# Patient Record
Sex: FEMALE | Race: BLACK OR AFRICAN AMERICAN | URBAN - METROPOLITAN AREA
[De-identification: names, ages, dates, MRNs, and addresses within clinical notes are randomized per-mention and may not be internally consistent; named-entity substitution may affect disease eponyms.]

---

## 2019-01-16 ENCOUNTER — EMERGENCY (EMERGENCY)
Facility: HOSPITAL | Age: 64
LOS: 0 days | Discharge: HOME | End: 2019-01-16
Attending: EMERGENCY MEDICINE | Admitting: EMERGENCY MEDICINE

## 2019-01-16 VITALS
SYSTOLIC BLOOD PRESSURE: 217 MMHG | DIASTOLIC BLOOD PRESSURE: 106 MMHG | RESPIRATION RATE: 18 BRPM | TEMPERATURE: 98 F | HEART RATE: 69 BPM | OXYGEN SATURATION: 98 %

## 2019-01-16 VITALS
OXYGEN SATURATION: 100 % | TEMPERATURE: 97 F | RESPIRATION RATE: 18 BRPM | SYSTOLIC BLOOD PRESSURE: 148 MMHG | DIASTOLIC BLOOD PRESSURE: 80 MMHG | HEART RATE: 63 BPM

## 2019-01-16 DIAGNOSIS — Z79.899 OTHER LONG TERM (CURRENT) DRUG THERAPY: ICD-10-CM

## 2019-01-16 DIAGNOSIS — I10 ESSENTIAL (PRIMARY) HYPERTENSION: ICD-10-CM

## 2019-01-16 DIAGNOSIS — E11.65 TYPE 2 DIABETES MELLITUS WITH HYPERGLYCEMIA: ICD-10-CM

## 2019-01-16 DIAGNOSIS — Z79.84 LONG TERM (CURRENT) USE OF ORAL HYPOGLYCEMIC DRUGS: ICD-10-CM

## 2019-01-16 LAB
ALBUMIN SERPL ELPH-MCNC: 4.7 G/DL — SIGNIFICANT CHANGE UP (ref 3.5–5.2)
ALP SERPL-CCNC: 93 U/L — SIGNIFICANT CHANGE UP (ref 30–115)
ALT FLD-CCNC: 13 U/L — SIGNIFICANT CHANGE UP (ref 0–41)
ANION GAP SERPL CALC-SCNC: 17 MMOL/L — HIGH (ref 7–14)
APPEARANCE UR: CLEAR — SIGNIFICANT CHANGE UP
AST SERPL-CCNC: 17 U/L — SIGNIFICANT CHANGE UP (ref 0–41)
BACTERIA # UR AUTO: ABNORMAL /HPF
BASE EXCESS BLDV CALC-SCNC: 5 MMOL/L — HIGH (ref -2–2)
BASOPHILS # BLD AUTO: 0.04 K/UL — SIGNIFICANT CHANGE UP (ref 0–0.2)
BASOPHILS NFR BLD AUTO: 0.7 % — SIGNIFICANT CHANGE UP (ref 0–1)
BILIRUB SERPL-MCNC: <0.2 MG/DL — SIGNIFICANT CHANGE UP (ref 0.2–1.2)
BILIRUB UR-MCNC: NEGATIVE — SIGNIFICANT CHANGE UP
BUN SERPL-MCNC: 10 MG/DL — SIGNIFICANT CHANGE UP (ref 10–20)
CA-I SERPL-SCNC: 1.17 MMOL/L — SIGNIFICANT CHANGE UP (ref 1.12–1.3)
CALCIUM SERPL-MCNC: 9.6 MG/DL — SIGNIFICANT CHANGE UP (ref 8.5–10.1)
CHLORIDE SERPL-SCNC: 91 MMOL/L — LOW (ref 98–110)
CO2 SERPL-SCNC: 28 MMOL/L — SIGNIFICANT CHANGE UP (ref 17–32)
COLOR SPEC: YELLOW — SIGNIFICANT CHANGE UP
CREAT SERPL-MCNC: 1.1 MG/DL — SIGNIFICANT CHANGE UP (ref 0.7–1.5)
DIFF PNL FLD: NEGATIVE — SIGNIFICANT CHANGE UP
EOSINOPHIL # BLD AUTO: 0.06 K/UL — SIGNIFICANT CHANGE UP (ref 0–0.7)
EOSINOPHIL NFR BLD AUTO: 1 % — SIGNIFICANT CHANGE UP (ref 0–8)
EPI CELLS # UR: ABNORMAL /HPF
GAS PNL BLDV: 138 MMOL/L — SIGNIFICANT CHANGE UP (ref 136–145)
GAS PNL BLDV: SIGNIFICANT CHANGE UP
GLUCOSE SERPL-MCNC: 272 MG/DL — HIGH (ref 70–99)
GLUCOSE UR QL: 500 MG/DL
HCO3 BLDV-SCNC: 32 MMOL/L — HIGH (ref 22–29)
HCT VFR BLD CALC: 41.3 % — SIGNIFICANT CHANGE UP (ref 37–47)
HCT VFR BLDA CALC: 42.1 % — SIGNIFICANT CHANGE UP (ref 34–44)
HGB BLD CALC-MCNC: 13.7 G/DL — LOW (ref 14–18)
HGB BLD-MCNC: 13.4 G/DL — SIGNIFICANT CHANGE UP (ref 12–16)
IMM GRANULOCYTES NFR BLD AUTO: 0.3 % — SIGNIFICANT CHANGE UP (ref 0.1–0.3)
KETONES UR-MCNC: NEGATIVE — SIGNIFICANT CHANGE UP
LACTATE BLDV-MCNC: 2.7 MMOL/L — HIGH (ref 0.5–1.6)
LEUKOCYTE ESTERASE UR-ACNC: NEGATIVE — SIGNIFICANT CHANGE UP
LYMPHOCYTES # BLD AUTO: 2.22 K/UL — SIGNIFICANT CHANGE UP (ref 1.2–3.4)
LYMPHOCYTES # BLD AUTO: 36.8 % — SIGNIFICANT CHANGE UP (ref 20.5–51.1)
MCHC RBC-ENTMCNC: 26.5 PG — LOW (ref 27–31)
MCHC RBC-ENTMCNC: 32.4 G/DL — SIGNIFICANT CHANGE UP (ref 32–37)
MCV RBC AUTO: 81.8 FL — SIGNIFICANT CHANGE UP (ref 81–99)
MONOCYTES # BLD AUTO: 0.44 K/UL — SIGNIFICANT CHANGE UP (ref 0.1–0.6)
MONOCYTES NFR BLD AUTO: 7.3 % — SIGNIFICANT CHANGE UP (ref 1.7–9.3)
NEUTROPHILS # BLD AUTO: 3.25 K/UL — SIGNIFICANT CHANGE UP (ref 1.4–6.5)
NEUTROPHILS NFR BLD AUTO: 53.9 % — SIGNIFICANT CHANGE UP (ref 42.2–75.2)
NITRITE UR-MCNC: NEGATIVE — SIGNIFICANT CHANGE UP
NRBC # BLD: 0 /100 WBCS — SIGNIFICANT CHANGE UP (ref 0–0)
PCO2 BLDV: 54 MMHG — HIGH (ref 41–51)
PH BLDV: 7.38 — SIGNIFICANT CHANGE UP (ref 7.26–7.43)
PH UR: 7 — SIGNIFICANT CHANGE UP (ref 5–8)
PLATELET # BLD AUTO: 171 K/UL — SIGNIFICANT CHANGE UP (ref 130–400)
PO2 BLDV: 22 MMHG — SIGNIFICANT CHANGE UP (ref 20–40)
POTASSIUM BLDV-SCNC: 3.4 MMOL/L — SIGNIFICANT CHANGE UP (ref 3.3–5.6)
POTASSIUM SERPL-MCNC: 3.8 MMOL/L — SIGNIFICANT CHANGE UP (ref 3.5–5)
POTASSIUM SERPL-SCNC: 3.8 MMOL/L — SIGNIFICANT CHANGE UP (ref 3.5–5)
PROT SERPL-MCNC: 8.2 G/DL — HIGH (ref 6–8)
PROT UR-MCNC: 30 MG/DL
RBC # BLD: 5.05 M/UL — SIGNIFICANT CHANGE UP (ref 4.2–5.4)
RBC # FLD: 13.6 % — SIGNIFICANT CHANGE UP (ref 11.5–14.5)
SAO2 % BLDV: 36 % — SIGNIFICANT CHANGE UP
SODIUM SERPL-SCNC: 136 MMOL/L — SIGNIFICANT CHANGE UP (ref 135–146)
SP GR SPEC: 1.01 — SIGNIFICANT CHANGE UP (ref 1.01–1.03)
TROPONIN T SERPL-MCNC: <0.01 NG/ML — SIGNIFICANT CHANGE UP
UROBILINOGEN FLD QL: 1 MG/DL (ref 0.2–0.2)
WBC # BLD: 6.03 K/UL — SIGNIFICANT CHANGE UP (ref 4.8–10.8)
WBC # FLD AUTO: 6.03 K/UL — SIGNIFICANT CHANGE UP (ref 4.8–10.8)
WBC UR QL: SIGNIFICANT CHANGE UP /HPF

## 2019-01-16 RX ORDER — AMLODIPINE BESYLATE 2.5 MG/1
1 TABLET ORAL
Qty: 14 | Refills: 0
Start: 2019-01-16 | End: 2019-01-29

## 2019-01-16 RX ORDER — SODIUM CHLORIDE 9 MG/ML
1000 INJECTION INTRAMUSCULAR; INTRAVENOUS; SUBCUTANEOUS ONCE
Qty: 0 | Refills: 0 | Status: COMPLETED | OUTPATIENT
Start: 2019-01-16 | End: 2019-01-16

## 2019-01-16 RX ADMIN — SODIUM CHLORIDE 2400 MILLILITER(S): 9 INJECTION INTRAMUSCULAR; INTRAVENOUS; SUBCUTANEOUS at 17:45

## 2019-01-16 NOTE — ED PROVIDER NOTE - NS ED ROS FT
Review of Systems    Constitutional: (-) fever  Eyes/ENT: (-) blurry vision, (-) epistaxis  Cardiovascular: (-) chest pain, (-) syncope  Respiratory: (-) cough, (-) shortness of breath  Gastrointestinal: (-) vomiting, (-) diarrhea  Musculoskeletal: (-) neck pain, (-) back pain, (-) joint pain  Integumentary: (-) rash, (-) edema  Neurological: (-) headache, (-) altered mental status + mild global weakness   Psychiatric: (-) hallucinations  Allergic/Immunologic: (-) pruritus  All other systems are negative except as mentioned above

## 2019-01-16 NOTE — ED PROVIDER NOTE - CARE PROVIDER_API CALL
Lester Walters), Internal Medicine  [address] [place] [address]  Phone: [phone]  Fax: [phone]

## 2019-01-16 NOTE — ED PROVIDER NOTE - PHYSICAL EXAMINATION
CON: ao x 3, HENMT: clear oropharynx,  neck supple,  CV: rrr, equal pulses b/l, RESP: cta b/l, GI:  soft, nontender, no rebound, no guarding, SKIN: no rash, MSK: no deformities, NEURO: no gross motor or sensory deficit normal gait, normal finger to nose, motor 5/5 X4, Psychiatric: appropriate mood, appropriate affect

## 2019-01-16 NOTE — ED ADULT TRIAGE NOTE - CHIEF COMPLAINT QUOTE
high blood pressure and high blood sugar , hx of HTN and DM sent by PMD high blood pressure and high blood sugar , hx of HTN and DM sent by PMD  in triage

## 2019-01-16 NOTE — ED PROVIDER NOTE - NSFOLLOWUPINSTRUCTIONS_ED_ALL_ED_FT
f/u on tuesday next week    Hypertension    Hypertension, commonly called high blood pressure, is when the force of blood pumping through your arteries is too strong. Hypertension forces your heart to work harder to pump blood. Your arteries may become narrow or stiff. Having untreated or uncontrolled hypertension for a long period of time can cause heart attack, stroke, kidney disease, and other problems. If started on a medication, take exactly as prescribed by your health care professional. Maintain a healthy lifestyle and follow up with your primary care physician.    SEEK IMMEDIATE MEDICAL CARE IF YOU HAVE ANY OF THE FOLLOWING SYMPTOMS: severe headache, confusion, chest pain, abdominal pain, vomiting, or shortness of breath.

## 2019-01-16 NOTE — ED PROVIDER NOTE - OBJECTIVE STATEMENT
63yr old female hx of dm on metformin  850mg, htn on losartan/hctz 100-25 here for eval of elevated bp and elevated blood sugars. pt reports she went to Dr. Walters today for regular cheeckup. found to have elevated bp and blood sugar so sent here. pt reports mild feel of global weakness, no other complaints. pt taking meds as prescribed. pt was sent in by Dr. Walters. pt denies chest pain, sob, polyuria, polydipsia, fever, chills.

## 2021-05-24 ENCOUNTER — INPATIENT (INPATIENT)
Facility: HOSPITAL | Age: 66
LOS: 7 days | Discharge: HOME HEALTH | End: 2021-06-01
Attending: INTERNAL MEDICINE | Admitting: INTERNAL MEDICINE
Payer: MEDICARE

## 2021-05-24 VITALS
DIASTOLIC BLOOD PRESSURE: 78 MMHG | HEART RATE: 78 BPM | TEMPERATURE: 98 F | RESPIRATION RATE: 18 BRPM | OXYGEN SATURATION: 98 % | SYSTOLIC BLOOD PRESSURE: 215 MMHG

## 2021-05-24 LAB
ALBUMIN SERPL ELPH-MCNC: 4.8 G/DL — SIGNIFICANT CHANGE UP (ref 3.5–5.2)
ALP SERPL-CCNC: 69 U/L — SIGNIFICANT CHANGE UP (ref 30–115)
ALT FLD-CCNC: 9 U/L — SIGNIFICANT CHANGE UP (ref 0–41)
ANION GAP SERPL CALC-SCNC: 13 MMOL/L — SIGNIFICANT CHANGE UP (ref 7–14)
AST SERPL-CCNC: 18 U/L — SIGNIFICANT CHANGE UP (ref 0–41)
BASOPHILS # BLD AUTO: 0.04 K/UL — SIGNIFICANT CHANGE UP (ref 0–0.2)
BASOPHILS NFR BLD AUTO: 0.7 % — SIGNIFICANT CHANGE UP (ref 0–1)
BILIRUB SERPL-MCNC: 0.2 MG/DL — SIGNIFICANT CHANGE UP (ref 0.2–1.2)
BUN SERPL-MCNC: 17 MG/DL — SIGNIFICANT CHANGE UP (ref 10–20)
CALCIUM SERPL-MCNC: 9.5 MG/DL — SIGNIFICANT CHANGE UP (ref 8.5–10.1)
CHLORIDE SERPL-SCNC: 98 MMOL/L — SIGNIFICANT CHANGE UP (ref 98–110)
CHOLEST SERPL-MCNC: 200 MG/DL — HIGH
CO2 SERPL-SCNC: 27 MMOL/L — SIGNIFICANT CHANGE UP (ref 17–32)
CREAT SERPL-MCNC: 0.8 MG/DL — SIGNIFICANT CHANGE UP (ref 0.7–1.5)
EOSINOPHIL # BLD AUTO: 0.03 K/UL — SIGNIFICANT CHANGE UP (ref 0–0.7)
EOSINOPHIL NFR BLD AUTO: 0.5 % — SIGNIFICANT CHANGE UP (ref 0–8)
GLUCOSE SERPL-MCNC: 136 MG/DL — HIGH (ref 70–99)
HCT VFR BLD CALC: 38.6 % — SIGNIFICANT CHANGE UP (ref 37–47)
HDLC SERPL-MCNC: 66 MG/DL — SIGNIFICANT CHANGE UP
HGB BLD-MCNC: 12.4 G/DL — SIGNIFICANT CHANGE UP (ref 12–16)
IMM GRANULOCYTES NFR BLD AUTO: 0.3 % — SIGNIFICANT CHANGE UP (ref 0.1–0.3)
LIPID PNL WITH DIRECT LDL SERPL: 111 MG/DL — HIGH
LYMPHOCYTES # BLD AUTO: 2.46 K/UL — SIGNIFICANT CHANGE UP (ref 1.2–3.4)
LYMPHOCYTES # BLD AUTO: 42.1 % — SIGNIFICANT CHANGE UP (ref 20.5–51.1)
MCHC RBC-ENTMCNC: 25.9 PG — LOW (ref 27–31)
MCHC RBC-ENTMCNC: 32.1 G/DL — SIGNIFICANT CHANGE UP (ref 32–37)
MCV RBC AUTO: 80.6 FL — LOW (ref 81–99)
MONOCYTES # BLD AUTO: 0.46 K/UL — SIGNIFICANT CHANGE UP (ref 0.1–0.6)
MONOCYTES NFR BLD AUTO: 7.9 % — SIGNIFICANT CHANGE UP (ref 1.7–9.3)
NEUTROPHILS # BLD AUTO: 2.84 K/UL — SIGNIFICANT CHANGE UP (ref 1.4–6.5)
NEUTROPHILS NFR BLD AUTO: 48.5 % — SIGNIFICANT CHANGE UP (ref 42.2–75.2)
NON HDL CHOLESTEROL: 134 MG/DL — HIGH
NRBC # BLD: 0 /100 WBCS — SIGNIFICANT CHANGE UP (ref 0–0)
PLATELET # BLD AUTO: 158 K/UL — SIGNIFICANT CHANGE UP (ref 130–400)
POTASSIUM SERPL-MCNC: 3.4 MMOL/L — LOW (ref 3.5–5)
POTASSIUM SERPL-SCNC: 3.4 MMOL/L — LOW (ref 3.5–5)
PROT SERPL-MCNC: 7.7 G/DL — SIGNIFICANT CHANGE UP (ref 6–8)
RBC # BLD: 4.79 M/UL — SIGNIFICANT CHANGE UP (ref 4.2–5.4)
RBC # FLD: 14.5 % — SIGNIFICANT CHANGE UP (ref 11.5–14.5)
SARS-COV-2 RNA SPEC QL NAA+PROBE: SIGNIFICANT CHANGE UP
SODIUM SERPL-SCNC: 138 MMOL/L — SIGNIFICANT CHANGE UP (ref 135–146)
TRIGL SERPL-MCNC: 154 MG/DL — HIGH
TROPONIN T SERPL-MCNC: <0.01 NG/ML — SIGNIFICANT CHANGE UP
WBC # BLD: 5.85 K/UL — SIGNIFICANT CHANGE UP (ref 4.8–10.8)
WBC # FLD AUTO: 5.85 K/UL — SIGNIFICANT CHANGE UP (ref 4.8–10.8)

## 2021-05-24 PROCEDURE — 99291 CRITICAL CARE FIRST HOUR: CPT | Mod: CS

## 2021-05-24 PROCEDURE — 93010 ELECTROCARDIOGRAM REPORT: CPT

## 2021-05-24 PROCEDURE — 70498 CT ANGIOGRAPHY NECK: CPT | Mod: 26

## 2021-05-24 PROCEDURE — 70496 CT ANGIOGRAPHY HEAD: CPT | Mod: 26

## 2021-05-24 PROCEDURE — 99223 1ST HOSP IP/OBS HIGH 75: CPT

## 2021-05-24 PROCEDURE — 70450 CT HEAD/BRAIN W/O DYE: CPT | Mod: 26,59,MA

## 2021-05-24 RX ORDER — POTASSIUM CHLORIDE 20 MEQ
40 PACKET (EA) ORAL ONCE
Refills: 0 | Status: COMPLETED | OUTPATIENT
Start: 2021-05-24 | End: 2021-05-24

## 2021-05-24 RX ORDER — ASPIRIN/CALCIUM CARB/MAGNESIUM 324 MG
243 TABLET ORAL ONCE
Refills: 0 | Status: COMPLETED | OUTPATIENT
Start: 2021-05-24 | End: 2021-05-24

## 2021-05-24 RX ORDER — CLOPIDOGREL BISULFATE 75 MG/1
300 TABLET, FILM COATED ORAL ONCE
Refills: 0 | Status: COMPLETED | OUTPATIENT
Start: 2021-05-24 | End: 2021-05-24

## 2021-05-24 RX ADMIN — CLOPIDOGREL BISULFATE 300 MILLIGRAM(S): 75 TABLET, FILM COATED ORAL at 21:39

## 2021-05-24 RX ADMIN — Medication 40 MILLIEQUIVALENT(S): at 21:44

## 2021-05-24 RX ADMIN — Medication 243 MILLIGRAM(S): at 21:40

## 2021-05-24 NOTE — H&P ADULT - ATTENDING COMMENTS
pt seen and examined.   pt still has slurred speech and facial droop ( left)   no weakness on ext  no dysphagia  mental status wnl    T(F): 98.4 (05-25-21 @ 07:38), Max: 99.1 (05-25-21 @ 03:46)  HR: 86 (05-25-21 @ 07:38) (78 - 87)  BP: 139/97 (05-25-21 @ 07:38) (139/97 - 215/78)  RR: 18 (05-25-21 @ 07:38) (18 - 18)  SpO2: 97% (05-25-21 @ 07:38) (95% - 98%)    < from: CT Angio Neck w/ IV Cont (05.24.21 @ 22:09) >    -Focal severe stenosis of the proximal left intracranial vertebral artery.  -Moderate to severe stenosis of the mid basilar artery.  -No CTA evidence of aneurysm or dissection.  -Hyperenhancing soft tissue structures visualized in the anterior soft tissues of the neck and base of tongue. Findings may reflect enhancing glandular tissue, ectopic thyroid or mass; this may be confirmed with nuclear medicine thyroid scan.    < end of copied text >    a/p  # acute cva ( clinical )   # vertebral art stenosis   # basilar art stenosis   # thryoid and base of tongue mass, check Thyroid function test. needs nuclear medicine thyroid scan, but it cannot be done for 4 more weeks bc pt just received contrast ( containing iodine)     #Progress Note Handoff  Pending (specify):  Consult: neuro, tests: MRI   Family discussion: dw pt and her daughter at the bedside. they understand plan and agree. pt is full code  Disposition: Home_

## 2021-05-24 NOTE — ED PROVIDER NOTE - CRITICAL CARE ATTENDING CONTRIBUTION TO CARE
66F pmh dm, htn, hl, hypothyroidism on baby asa p/w cva-sx x 3d. Woke up Sat am / 3d prior with R facial droop, R hand numbness & R foot weakness, described as having to "move her leg more" to walk. Saw PCP Dr. Daniel today, referred to ED for stroke work-up, requested call back and admission to hospitalist. Pt denies any falls or head trauma. No ha, vision changes, neck pain, cp/sob, nv, abd pain. Hypertensive in ED to 220s/120s, states she takes losartan, does not recall any other BP meds.    nad  skin warm, dry  ncat  neck supple  rrr nl s1s2 no mrg  ctab no wrr  abd soft ntnd no palpable masses no rgr  back non-tender no cvat  ext no cce dpi  neuro aaox3, mild R facial droop & slurred speech, remaining CN intact bl, no nystagmus, mild decr R hand  strength, no drift of UE/LE bl, mild decr R foot plantar flextion, gross/objective sensation intact, finger-nose nl, gait wnl

## 2021-05-24 NOTE — ED PROVIDER NOTE - CARE PLAN
Principal Discharge DX:	CVA (cerebral vascular accident)   Principal Discharge DX:	CVA (cerebral vascular accident)  Secondary Diagnosis:	Hypokalemia

## 2021-05-24 NOTE — CONSULT NOTE ADULT - SUBJECTIVE AND OBJECTIVE BOX
Neurology Consult    Patient is a 66y old  Female who presents with a chief complaint of     HPI: 66F on ASA at home for unclear reason (states her doctor told her to have it but doesnt know why), HTN, DM presents to the ED today for 2 days of R sided weakness, sent in by her PCP for concern for an acute stroke. Patient hypertensive to the 200s and NIH stroke scale 1 for RUE weakness. CTH pending     PAST MEDICAL & SURGICAL HISTORY:  Diabetes  Hypertension    Social History: (-) x 3  Allergies  No Known Allergies  Intolerances    MEDICATIONS  (STANDING):  ASA 81  Lipitor 80    Review of systems:    Constitutional: as per HPI  Neurological: As per HPI      Vital Signs Last 24 Hrs  T(C): 36.7 (24 May 2021 16:54), Max: 36.7 (24 May 2021 16:54)  T(F): 98 (24 May 2021 16:54), Max: 98 (24 May 2021 16:54)  HR: 78 (24 May 2021 16:54) (78 - 78)  BP: 215/78 (24 May 2021 16:54) (215/78 - 215/78)  BP(mean): --  RR: 18 (24 May 2021 16:54) (18 - 18)  SpO2: 98% (24 May 2021 16:54) (98% - 98%)    Examination:  AxOx3, able to follow simple and complex commands  PERRL, EMOI, Face symmetric, VFF  RUE 4+/5 otherwise 5/5 throughout  gait deferred     Labs: pending   Neuroimaging:  NCHCT:   pending

## 2021-05-24 NOTE — H&P ADULT - HISTORY OF PRESENT ILLNESS
66 F pmh DM, HTN, DLD, hypothyroidism on baby ASA p/w CVA-like symptoms x 3d. 3 days ago woke up w/ R facial droop, R hand numbness & R foot weakness, described as having to "move her leg more" to walk. Saw PCP Dr. Daniel prior to admission, referred to ED for stroke work-up. Pt denies any falls or head trauma. No ha, vision changes, neck pain, cp/sob, nv, abd pain. Hypertensive in ED to 220s/120s, states she takes losartan, does not recall any other BP meds.    CTH showing more prominent microvascular changes since prior exam (1/16/19) but no evidence of territorial infarct. CTA H/N showing focal severe stenosis of proximal L intracranial vertebral artery, moderate stenosis of mild basilar artery, no aneurysm/dissection.    66 F pmh DM, HTN, DLD, total thyroidectomy, hypothyroidism, unspecified heart murmur on baby ASA p/w CVA-like symptoms x 3d. 3 days ago patient states she had difficulty writing (R-handed) and her children told her her face "looked strange" and that she was speaking slowly. Additionally she was having R foot weakness described as having to "more her leg more". She made PCP appt for 5/24 and she was told to come to the ED. In the ED noted to have R hand numbness & R foot weakness. Pt denies any falls or head trauma. No ha, vision changes, neck pain, cp/sob, nv, abd pain. Hypertensive in ED to 220s/120s.   CTH showing more prominent microvascular changes since prior exam (1/16/19) but no evidence of territorial infarct. CTA H/N showing focal severe stenosis of proximal L intracranial vertebral artery, moderate stenosis of mild basilar artery, no aneurysm/dissection.

## 2021-05-24 NOTE — ED ADULT NURSE NOTE - CHPI ED NUR SYMPTOMS NEG
no blurred vision/no change in level of consciousness/no confusion/no dizziness/no fever/no nausea/no vomiting

## 2021-05-24 NOTE — ED PROVIDER NOTE - PHYSICAL EXAMINATION
PE:  nad  skin warm, dry  ncat  neck supple  rrr nl s1s2 no mrg  ctab no wrr  abd soft ntnd no palpable masses no rgr  back non-tender no cvat  ext no cce dpi  neuro aaox3, mild R facial droop & slurred speech, remaining CN intact bl, no nystagmus, mild decr R hand  strength, no drift of UE/LE bl, mild decr R foot plantar flextion, gross/objective sensation intact, finger-nose nl, gait wnl

## 2021-05-24 NOTE — ED PROVIDER NOTE - CLINICAL SUMMARY MEDICAL DECISION MAKING FREE TEXT BOX
cva sx x 3d - neuro consulted, outside window for tPA, nc cth neg, cta head/neck +severe L prox ICA stenosis, case discussed btw Neuro NP & Dr. Gillis who rec full asa, loading plavix 300, further recs/imaging as documented in consult note, and stroke unit admission cva sx x 3d - neuro consulted, outside window for tPA, nc cth neg, cta head/neck +severe L prox ICA stenosis, case discussed btw Neuro NP & Dr. Gillis who rec full asa, loading plavix 300, permissive htn x 24h <220/110, further recs/imaging as documented in consult note, and stroke unit admission

## 2021-05-24 NOTE — ED PROVIDER NOTE - OBJECTIVE STATEMENT
66F pmh dm, htn, hl, hypothyroidism on baby asa p/w cva-sx x 3d. Woke up Sat am / 3d prior with R facial droop, R hand numbness & R foot weakness, described as having to "move her leg more" to walk. Saw PCP Dr. Daniel today, referred to ED for stroke work-up, requested call back and admission to hospitalist. Pt denies any falls or head trauma. No ha, vision changes, neck pain, cp/sob, nv, abd pain. Hypertensive in ED to 220s/120s, states she takes losartan, does not recall any other BP meds.

## 2021-05-24 NOTE — ED PROVIDER NOTE - NS ED ROS FT
Constitutional: No fever, chills, unintended weight loss.  Eyes:  No visual changes, eye pain or discharge.  ENMT:  No hearing changes, pain, no sore throat or runny nose, no difficulty swallowing  Cardiac:  No chest pain, SOB or edema. No chest pain with exertion.  Respiratory:  No cough or respiratory distress. No hemoptysis. No history of asthma or RAD.  GI:  No nausea, vomiting, diarrhea or abdominal pain.  :  No dysuria, frequency or burning.  MS:  No myalgia, muscle weakness, joint pain or back pain.  Neuro:  see hpi  Skin:  No skin rash.   Endocrine: +hypothyroidism +diabetes.

## 2021-05-24 NOTE — H&P ADULT - NSHPPHYSICALEXAM_GEN_ALL_CORE
PHYSICAL EXAM:  GENERAL: NAD, lying in bed comfortably  HEAD:  Atraumatic, Normocephalic  EYES: EOMI, PERRLA, conjunctiva and sclera clear  ENT: Moist mucous membranes  NECK: Supple, No JVD  CHEST/LUNG: Clear to auscultation bilaterally; No rales, rhonchi, wheezing, or rubs. Unlabored respirations  HEART: Regular rate and rhythm; No murmurs, rubs, or gallops  ABDOMEN: Bowel sounds present; Soft, Nontender, Nondistended. No hepatomegally  EXTREMITIES:  2+ Peripheral Pulses, brisk capillary refill. No clubbing, cyanosis, or edema  NERVOUS SYSTEM:  Alert & Oriented X3, speech clear. R facial droop (mild) w/ some dysarthria; 4/5 R hand  strength, 4/5 R foot plantar flexion; No dysdiadochoknesia; no dysmetria;   MSK: FROM all 4 extremities, full and equal strength  SKIN: No rashes or lesions PHYSICAL EXAM:  GENERAL: NAD, lying in bed comfortably  HEAD:  Atraumatic, Normocephalic  EYES: EOMI, PERRLA, conjunctiva and sclera clear  ENT: Moist mucous membranes  NECK: Supple, No JVD  CHEST/LUNG: Clear to auscultation bilaterally; No rales, rhonchi, wheezing, or rubs. Unlabored respirations  HEART: Regular rate and rhythm; No murmurs, rubs, or gallops  ABDOMEN: Bowel sounds present; Soft, Nontender, Nondistended. No hepatomegally  EXTREMITIES:  2+ Peripheral Pulses, brisk capillary refill. No clubbing, cyanosis, or edema  NERVOUS SYSTEM:  Alert & Oriented X3, speech clear. L facial droop w/ some dysarthria; 4/5 R hand  strength, 4/5 R foot dorsi flexion; No dysdiadochokinesia; no dysmetria;   MSK: FROM all 4 extremities, full and equal strength  SKIN: No rashes or lesions

## 2021-05-24 NOTE — ED ADULT NURSE NOTE - OBJECTIVE STATEMENT
Patient presents to ED with complaints of slowed speech, right hand numbness, and right foot weakness since Saturday. Patient reports she woke up on Saturday and noticed her speech was more slowed than normal and she had difficulty writing with her right hand. Patient states she feels like she is dragging her right leg when walking.

## 2021-05-24 NOTE — H&P ADULT - NSHPREVIEWOFSYSTEMS_GEN_ALL_CORE

## 2021-05-24 NOTE — H&P ADULT - ASSESSMENT
#R facial droop, R decreased hand /plantar flexion; DDx: Acute stroke vs CVA   66 F pmh DM, HTN, DLD, total thyroidectomy, hypothyroidism, unspecified heart murmur on baby ASA p/w CVA-like symptoms. Hypertensive in ED to 220s/120s.   CTH showing more prominent microvascular changes since prior exam (1/16/19) but no evidence of territorial infarct. CTA H/N showing focal severe stenosis of proximal L intracranial vertebral artery, moderate stenosis of mild basilar artery, no aneurysm/dissection.     #L hemifacial droop and R sided focal deficits 2/2 acute CVA vs TIA  - CTH: no territorial infarct but more prominent microvascular changes compared to prior exam on 5/24/19  - CTA H/N: Focal severe stenosis of proximal L intracranial vertebral artery, mod-sev stenosis of mid basilar artery  - Hx of heart murmur (unspecified)  - Not on blood thinners at home, only on ASA qd  - S/p ASA and plavix loading doses  > C/w ASA 81mg qd  > C/w plavix 75mg qd  > MRI brain non-con  > TTE  > Tele monitoring  > Neurochecks q4h  > Permissive HTN (SBP up to 220) for 24hrs   > A1c, lipid panel, troponin  > F/u Neuro recs after MRI brain  > PT/OT    #HTN   > Permissive HTN for now (up to SBP of 220) for 24hrs  > Start home BP meds if patient still hypertensive after 24hrs    #DM  > F/u FS per routine  > Start basal/bolus if FS >180    #DLD  > Start high-dose statin    #Hypothyroidism  #Hx of complete thyroidectomy  > C/w home levothyroxine    FULL CODE  Activity: IAT  Diet: DASH/TLC  DVT ppx: Heparin sq BID  GI ppx: Protonix  Dispo: Acute

## 2021-05-24 NOTE — CONSULT NOTE ADULT - ASSESSMENT
66F with Hx of DM, HTN, HLD, on ASA 81 presents to ED for R sided weakness and facial droop noted by PCP today when she went for a follow up appointment. PCP sent her into ED for evaluation for new stroke. CTH pending     Recomendations  - q4 stroke neurochecks  - obtain CTH to rule out bleed- if no bleeding noted can leave patient permissively HTN <220/110 for 24 hours  - admit to stroke unit for 24 hours for close monitoring  - MRI Brain with vessels without contrast   - telemetry   - RONALD   - Load ASA and continue 81mg  - Load Plavix 300mg and continue 75mg daily  - HA1C, Lipid panel, Troponin x1  - PTOT  - care per primary team     Case discussed with Dr. Rae

## 2021-05-25 PROBLEM — E11.9 TYPE 2 DIABETES MELLITUS WITHOUT COMPLICATIONS: Chronic | Status: ACTIVE | Noted: 2019-01-16

## 2021-05-25 PROBLEM — I10 ESSENTIAL (PRIMARY) HYPERTENSION: Chronic | Status: ACTIVE | Noted: 2019-01-16

## 2021-05-25 LAB
A1C WITH ESTIMATED AVERAGE GLUCOSE RESULT: 8.2 % — HIGH (ref 4–5.6)
A1C WITH ESTIMATED AVERAGE GLUCOSE RESULT: 8.2 % — HIGH (ref 4–5.6)
ANION GAP SERPL CALC-SCNC: 11 MMOL/L — SIGNIFICANT CHANGE UP (ref 7–14)
APTT BLD: 36 SEC — SIGNIFICANT CHANGE UP (ref 27–39.2)
BUN SERPL-MCNC: 12 MG/DL — SIGNIFICANT CHANGE UP (ref 10–20)
CALCIUM SERPL-MCNC: 9.2 MG/DL — SIGNIFICANT CHANGE UP (ref 8.5–10.1)
CHLORIDE SERPL-SCNC: 98 MMOL/L — SIGNIFICANT CHANGE UP (ref 98–110)
CHOLEST SERPL-MCNC: 190 MG/DL — SIGNIFICANT CHANGE UP
CO2 SERPL-SCNC: 26 MMOL/L — SIGNIFICANT CHANGE UP (ref 17–32)
COVID-19 SPIKE DOMAIN AB INTERP: POSITIVE
COVID-19 SPIKE DOMAIN ANTIBODY RESULT: >250 U/ML — HIGH
CREAT SERPL-MCNC: 0.8 MG/DL — SIGNIFICANT CHANGE UP (ref 0.7–1.5)
ESTIMATED AVERAGE GLUCOSE: 189 MG/DL — HIGH (ref 68–114)
ESTIMATED AVERAGE GLUCOSE: 189 MG/DL — HIGH (ref 68–114)
GLUCOSE BLDC GLUCOMTR-MCNC: 160 MG/DL — HIGH (ref 70–99)
GLUCOSE BLDC GLUCOMTR-MCNC: 173 MG/DL — HIGH (ref 70–99)
GLUCOSE BLDC GLUCOMTR-MCNC: 175 MG/DL — HIGH (ref 70–99)
GLUCOSE BLDC GLUCOMTR-MCNC: 205 MG/DL — HIGH (ref 70–99)
GLUCOSE SERPL-MCNC: 202 MG/DL — HIGH (ref 70–99)
HCT VFR BLD CALC: 38.1 % — SIGNIFICANT CHANGE UP (ref 37–47)
HCV AB S/CO SERPL IA: 0.06 COI — SIGNIFICANT CHANGE UP
HCV AB SERPL-IMP: SIGNIFICANT CHANGE UP
HDLC SERPL-MCNC: 61 MG/DL — SIGNIFICANT CHANGE UP
HGB BLD-MCNC: 12.1 G/DL — SIGNIFICANT CHANGE UP (ref 12–16)
LIPID PNL WITH DIRECT LDL SERPL: 103 MG/DL — HIGH
MCHC RBC-ENTMCNC: 25.2 PG — LOW (ref 27–31)
MCHC RBC-ENTMCNC: 31.8 G/DL — LOW (ref 32–37)
MCV RBC AUTO: 79.2 FL — LOW (ref 81–99)
NON HDL CHOLESTEROL: 129 MG/DL — SIGNIFICANT CHANGE UP
NRBC # BLD: 0 /100 WBCS — SIGNIFICANT CHANGE UP (ref 0–0)
PLATELET # BLD AUTO: 143 K/UL — SIGNIFICANT CHANGE UP (ref 130–400)
POTASSIUM SERPL-MCNC: 3.4 MMOL/L — LOW (ref 3.5–5)
POTASSIUM SERPL-SCNC: 3.4 MMOL/L — LOW (ref 3.5–5)
RBC # BLD: 4.81 M/UL — SIGNIFICANT CHANGE UP (ref 4.2–5.4)
RBC # FLD: 14.5 % — SIGNIFICANT CHANGE UP (ref 11.5–14.5)
SARS-COV-2 IGG+IGM SERPL QL IA: >250 U/ML — HIGH
SARS-COV-2 IGG+IGM SERPL QL IA: POSITIVE
SODIUM SERPL-SCNC: 135 MMOL/L — SIGNIFICANT CHANGE UP (ref 135–146)
TRIGL SERPL-MCNC: 122 MG/DL — SIGNIFICANT CHANGE UP
TROPONIN T SERPL-MCNC: <0.01 NG/ML — SIGNIFICANT CHANGE UP
WBC # BLD: 4.14 K/UL — LOW (ref 4.8–10.8)
WBC # FLD AUTO: 4.14 K/UL — LOW (ref 4.8–10.8)

## 2021-05-25 PROCEDURE — 99223 1ST HOSP IP/OBS HIGH 75: CPT

## 2021-05-25 PROCEDURE — 70551 MRI BRAIN STEM W/O DYE: CPT | Mod: 26

## 2021-05-25 PROCEDURE — 99497 ADVNCD CARE PLAN 30 MIN: CPT | Mod: 25

## 2021-05-25 RX ORDER — HEPARIN SODIUM 5000 [USP'U]/ML
5000 INJECTION INTRAVENOUS; SUBCUTANEOUS EVERY 12 HOURS
Refills: 0 | Status: DISCONTINUED | OUTPATIENT
Start: 2021-05-25 | End: 2021-06-01

## 2021-05-25 RX ORDER — CLOPIDOGREL BISULFATE 75 MG/1
75 TABLET, FILM COATED ORAL DAILY
Refills: 0 | Status: DISCONTINUED | OUTPATIENT
Start: 2021-05-25 | End: 2021-06-01

## 2021-05-25 RX ORDER — DEXTROSE 50 % IN WATER 50 %
15 SYRINGE (ML) INTRAVENOUS ONCE
Refills: 0 | Status: DISCONTINUED | OUTPATIENT
Start: 2021-05-25 | End: 2021-06-01

## 2021-05-25 RX ORDER — ASPIRIN/CALCIUM CARB/MAGNESIUM 324 MG
81 TABLET ORAL DAILY
Refills: 0 | Status: DISCONTINUED | OUTPATIENT
Start: 2021-05-25 | End: 2021-06-01

## 2021-05-25 RX ORDER — SODIUM CHLORIDE 9 MG/ML
1000 INJECTION, SOLUTION INTRAVENOUS
Refills: 0 | Status: DISCONTINUED | OUTPATIENT
Start: 2021-05-25 | End: 2021-06-01

## 2021-05-25 RX ORDER — DEXTROSE 50 % IN WATER 50 %
25 SYRINGE (ML) INTRAVENOUS ONCE
Refills: 0 | Status: DISCONTINUED | OUTPATIENT
Start: 2021-05-25 | End: 2021-06-01

## 2021-05-25 RX ORDER — AMLODIPINE BESYLATE 2.5 MG/1
5 TABLET ORAL ONCE
Refills: 0 | Status: COMPLETED | OUTPATIENT
Start: 2021-05-25 | End: 2021-05-25

## 2021-05-25 RX ORDER — GLUCAGON INJECTION, SOLUTION 0.5 MG/.1ML
1 INJECTION, SOLUTION SUBCUTANEOUS ONCE
Refills: 0 | Status: DISCONTINUED | OUTPATIENT
Start: 2021-05-25 | End: 2021-06-01

## 2021-05-25 RX ORDER — INSULIN LISPRO 100/ML
VIAL (ML) SUBCUTANEOUS
Refills: 0 | Status: DISCONTINUED | OUTPATIENT
Start: 2021-05-25 | End: 2021-06-01

## 2021-05-25 RX ORDER — ATORVASTATIN CALCIUM 80 MG/1
80 TABLET, FILM COATED ORAL AT BEDTIME
Refills: 0 | Status: DISCONTINUED | OUTPATIENT
Start: 2021-05-25 | End: 2021-06-01

## 2021-05-25 RX ORDER — DEXTROSE 50 % IN WATER 50 %
12.5 SYRINGE (ML) INTRAVENOUS ONCE
Refills: 0 | Status: DISCONTINUED | OUTPATIENT
Start: 2021-05-25 | End: 2021-06-01

## 2021-05-25 RX ORDER — LEVOTHYROXINE SODIUM 125 MCG
137 TABLET ORAL DAILY
Refills: 0 | Status: DISCONTINUED | OUTPATIENT
Start: 2021-05-25 | End: 2021-06-01

## 2021-05-25 RX ORDER — PANTOPRAZOLE SODIUM 20 MG/1
40 TABLET, DELAYED RELEASE ORAL
Refills: 0 | Status: DISCONTINUED | OUTPATIENT
Start: 2021-05-25 | End: 2021-06-01

## 2021-05-25 RX ADMIN — ATORVASTATIN CALCIUM 80 MILLIGRAM(S): 80 TABLET, FILM COATED ORAL at 22:08

## 2021-05-25 RX ADMIN — Medication 1: at 17:11

## 2021-05-25 RX ADMIN — AMLODIPINE BESYLATE 5 MILLIGRAM(S): 2.5 TABLET ORAL at 22:08

## 2021-05-25 RX ADMIN — HEPARIN SODIUM 5000 UNIT(S): 5000 INJECTION INTRAVENOUS; SUBCUTANEOUS at 17:12

## 2021-05-25 RX ADMIN — CLOPIDOGREL BISULFATE 75 MILLIGRAM(S): 75 TABLET, FILM COATED ORAL at 12:45

## 2021-05-25 RX ADMIN — Medication 81 MILLIGRAM(S): at 12:44

## 2021-05-25 NOTE — PHYSICAL THERAPY INITIAL EVALUATION ADULT - PERTINENT HX OF CURRENT PROBLEM, REHAB EVAL
66 F pmh DM, HTN, DLD, total thyroidectomy, hypothyroidism, unspecified heart murmur on baby ASA p/w CVA-like symptoms x 3d. 3 days ago patient states she had difficulty writing (R-handed) and her children told her her face "looked strange" and that she was speaking slowly. Additionally she was having R foot weakness described as having to "more her leg more". She made PCP appt for 5/24 and she was told to come to the ED.

## 2021-05-25 NOTE — PHYSICAL THERAPY INITIAL EVALUATION ADULT - GENERAL OBSERVATIONS, REHAB EVAL
Pt. encountered alert and NAD, supine in bed, agreeable to PT Eval. Pt. left as found, supine in bed, (+)alarms (+)Call bell in reach, NAD

## 2021-05-25 NOTE — CHART NOTE - NSCHARTNOTEFT_GEN_A_CORE
I spoke with the patient regarding end of life care and her wishes in the event that she were to go into cardiac arrest. She understands the risk, benefits and prognosis of CPR and intubation given her age and co-morbidities. She states "do everything to keep me alive." At this time, patient wants to remain FULL CODE.

## 2021-05-25 NOTE — PHYSICAL THERAPY INITIAL EVALUATION ADULT - REHAB POTENTIAL, PT EVAL
Pt. is functioning at her PLOF and does not require acute PT services at this time. Unit ambulation encouraged./none

## 2021-05-25 NOTE — PROGRESS NOTE ADULT - SUBJECTIVE AND OBJECTIVE BOX
SUBJECTIVE:    Patient is a 66y old  Female who presents with a chief complaint of Facial droop (24 May 2021 22:15)    Currently admitted to medicine with the primary diagnosis of CVA (cerebral vascular accident)       Today is hospital day 1d. Patient was seen and examined at bedside. This morning she is resting comfortably in bed and reports no new issues or overnight events. No current complaints.     PAST MEDICAL & SURGICAL HISTORY  PAST MEDICAL & SURGICAL HISTORY:  Diabetes    Hypertension      SOCIAL HISTORY:    ALLERGIES:  No Known Allergies    MEDICATIONS:  STANDING MEDICATIONS  aspirin  chewable 81 milliGRAM(s) Oral daily  atorvastatin 80 milliGRAM(s) Oral at bedtime  clopidogrel Tablet 75 milliGRAM(s) Oral daily  heparin   Injectable 5000 Unit(s) SubCutaneous every 12 hours  levothyroxine 137 MICROGram(s) Oral daily  pantoprazole    Tablet 40 milliGRAM(s) Oral before breakfast    PRN MEDICATIONS    VITALS:   T(F): 98.4  HR: 86  BP: 139/97  RR: 18  SpO2: 97%    LABS:                        12.4   5.85  )-----------( 158      ( 24 May 2021 18:13 )             38.6     05-24    138  |  98  |  17  ----------------------------<  136<H>  3.4<L>   |  27  |  0.8    Ca    9.5      24 May 2021 18:13    TPro  7.7  /  Alb  4.8  /  TBili  0.2  /  DBili  x   /  AST  18  /  ALT  9   /  AlkPhos  69  05-24          Troponin T, Serum: <0.01 ng/mL (05-24-21 @ 18:01)      CARDIAC MARKERS ( 24 May 2021 18:01 )  x     / <0.01 ng/mL / x     / x     / x          RADIOLOGY:  < from: CT Angio Neck w/ IV Cont (05.24.21 @ 22:09) >  CTA neck:  The aortic arch, origin of the great vessels and subclavian arteries are unremarkable. The bilateral common carotid arteries are unremarkable.    Minimal atherosclerotic calcifications along the bilateral carotid bifurcations without significant stenosis. 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 There is a retropharyngeal, medial course of the left cervical internal carotid artery.    The bilateral cervical vertebral arteries are unremarkable.    CTA head:  The intracranial internal carotid arteries are patent without significant stenosis. Scattered atherosclerotic calcifications along the bilateral cavernous segments.    The middle cerebral arteries and anterior cerebral arteries are unremarkable without significant stenosis.    There is focal severe stenosis of the proximal intradural left vertebral artery V4 segment. The intracranial right vertebral artery is unremarkable.    There are moderate to severe stenosis of the mid basilar artery.    The superior cerebellar arteries and posterior cerebral arteries are without significant stenosis.    Degenerative changes of the spine. Nonvisualization of the thyroid. There is a hyperenhancing 1.7 x 1.8cm structure within the anterior portion of the neck.    Additional smaller 0.8 x 1.5 cm hyperdense structure in the base of tongue and in the left lateral tracheal space, measuring 0.6 x 1 cm.    < end of copied text >        PHYSICAL EXAM:  GENERAL: NAD, speaks in full sentences, no signs of respiratory distress  HEAD: Atraumatic  NECK: Supple  CHEST/LUNG: Clear to auscultation bilaterally; No wheeze or crackles  HEART: S1, S2; RRR; No murmurs, rubs, or gallops  ABDOMEN: BS+; Soft, Non-tender, Non-distended  EXTREMITIES:  2+ Peripheral Pulses, No clubbing, cyanosis, or edema  PSYCH: AAOx3  NEUROLOGY: speech clear. L facial droop w/ some dysarthria; 4/5 R hand  strength, 4/5 R foot dorsi flexion; No dysdiadochokinesia; no dysmetria;   SKIN: No rashes or lesions

## 2021-05-25 NOTE — PHYSICAL THERAPY INITIAL EVALUATION ADULT - ADDITIONAL COMMENTS
Pt. reports she lives in an elevator apartment with no FLORESITA. Pt. reports she previously did not use an AD and was independent in all aspects.

## 2021-05-25 NOTE — PROGRESS NOTE ADULT - ASSESSMENT
66 F pmh DM, HTN, DLD, total thyroidectomy, hypothyroidism, unspecified heart murmur on baby ASA p/w CVA-like symptoms. Hypertensive in ED to 220s/120s.   CTH showing more prominent microvascular changes since prior exam (1/16/19) but no evidence of territorial infarct. CTA H/N showing focal severe stenosis of proximal L intracranial vertebral artery, moderate stenosis of mild basilar artery, no aneurysm/dissection.     #L hemifacial droop and R sided focal deficits 2/2 acute CVA vs TIA  - Neuro on board   - CTH: no territorial infarct but more prominent microvascular changes compared to prior exam on 5/24/19  - CTA H/N: Focal severe stenosis of proximal L intracranial vertebral artery, mod-sev stenosis of mid basilar artery  - Hx of heart murmur (unspecified)  - Not on blood thinners at home, only on ASA qd  - S/p ASA and plavix loading doses  > C/w ASA 81mg qd  > C/w plavix 75mg qd  > MRI brain non-con  > TTE  > Tele monitoring  > Neurochecks q4h  > Permissive HTN (SBP up to 220) for 24hrs   - Troponin negative x 1   > A1c, lipid panel  > PT/OT    #HTN   > Permissive HTN for now (up to SBP of 220) for 24hrs  > Start home BP meds if patient still hypertensive after 24hrs    #DM  > F/u FS per routine  > Start basal/bolus if FS >180    #DLD  > Start high-dose statin    #Hypothyroidism  #Hx of complete thyroidectomy  > C/w home levothyroxine    FULL CODE  Activity: IAT  Diet: DASH/TLC  DVT ppx: Heparin sq BID  GI ppx: Protonix  Dispo: Acute     66 F pmh DM, HTN, DLD, total thyroidectomy, hypothyroidism, unspecified heart murmur on baby ASA p/w CVA-like symptoms. Hypertensive in ED to 220s/120s.   CTH showing more prominent microvascular changes since prior exam (1/16/19) but no evidence of territorial infarct. CTA H/N showing focal severe stenosis of proximal L intracranial vertebral artery, moderate stenosis of mild basilar artery, no aneurysm/dissection.     #L hemifacial droop and R sided focal deficits 2/2 acute CVA vs TIA  - Neuro on board   - CTH: no territorial infarct but more prominent microvascular changes compared to prior exam on 5/24/19  - CTA H/N: Focal severe stenosis of proximal L intracranial vertebral artery, mod-sev stenosis of mid basilar artery  - Hx of heart murmur (unspecified)  - Not on blood thinners at home, only on ASA qd  - S/p ASA and plavix loading doses  > C/w ASA 81mg qd  > C/w plavix 75mg qd  > MRI brain non-con  > TTE  > Tele monitoring  > Neurochecks q4h  > Permissive HTN (SBP up to 220) for 24hrs   - Troponin negative x 1   > A1c, lipid panel  > PT/OT    #HTN   > Permissive HTN for now (up to SBP of 220) for 24hrs  > Start home BP meds if patient still hypertensive after 24hrs    #DM  > F/u FS per routine  > Start basal/bolus if FS >180    #DLD  > Start high-dose statin    #Hypothyroidism  #Hx of complete thyroidectomy  #Hyperenhancing soft tissue structures visualized in the anterior soft tissues of the neck and base of tongue.   >CT angio of neck: Hyperenhancing soft tissue structures visualized in the anterior soft tissues of the neck and base of tongue. Findings may reflect enhancing glandular tissue, ectopic thyroid or mass; this may be confirmed with nuclear medicine thyroid scan  > C/w home levothyroxine    FULL CODE  Activity: IAT  Diet: DASH/TLC  DVT ppx: Heparin sq BID  GI ppx: Protonix  Dispo: Acute   66 F pmh DM, HTN, DLD, total thyroidectomy, hypothyroidism, unspecified heart murmur on baby ASA p/w CVA-like symptoms. Hypertensive in ED to 220s/120s.   CTH showing more prominent microvascular changes since prior exam (1/16/19) but no evidence of territorial infarct. CTA H/N showing focal severe stenosis of proximal L intracranial vertebral artery, moderate stenosis of mild basilar artery, no aneurysm/dissection.     #L hemifacial droop and R sided focal deficits 2/2 acute CVA vs TIA  - Neuro on board   - CTH: no territorial infarct but more prominent microvascular changes compared to prior exam on 5/24/19  - CTA H/N: Focal severe stenosis of proximal L intracranial vertebral artery, mod-sev stenosis of mid basilar artery  - Hx of heart murmur (unspecified)  - Not on blood thinners at home, only on ASA qd  - S/p ASA and plavix loading doses  > C/w ASA 81mg qd  > C/w plavix 75mg qd  > MRI brain non-con  > TTE  > Tele monitoring  > Neurochecks q4h  > Permissive HTN (SBP up to 220) for 24hrs   - Troponin negative x 1   > A1c, lipid panel  > PT/OT    #HTN   > Permissive HTN for now (up to SBP of 220) for 24hrs  > Start home BP meds if patient still hypertensive after 24hrs    #DM  > F/u FS per routine  > Start basal/bolus if FS >180    #DLD  > Start high-dose statin    #Hypothyroidism  #Hx of complete thyroidectomy  #Hyperenhancing soft tissue structures visualized in the anterior soft tissues of the neck and base of tongue.   >CT angio of neck: Hyperenhancing soft tissue structures visualized in the anterior soft tissues of the neck and base of tongue. Findings may reflect enhancing glandular tissue, ectopic thyroid or mass; this may be confirmed with nuclear medicine thyroid scan  - Holding  on thyroid scan. Spoke to radiology and patient needs 4 weeks of no iodine contrast in order to get scan. Used contrast yesterday. f/u OP   > C/w home levothyroxine    FULL CODE  Activity: IAT  Diet: DASH/TLC  DVT ppx: Heparin sq BID  GI ppx: Protonix  Dispo: Acute

## 2021-05-26 LAB
ALBUMIN SERPL ELPH-MCNC: 4.3 G/DL — SIGNIFICANT CHANGE UP (ref 3.5–5.2)
ALP SERPL-CCNC: 66 U/L — SIGNIFICANT CHANGE UP (ref 30–115)
ALT FLD-CCNC: 8 U/L — SIGNIFICANT CHANGE UP (ref 0–41)
ANION GAP SERPL CALC-SCNC: 11 MMOL/L — SIGNIFICANT CHANGE UP (ref 7–14)
AST SERPL-CCNC: 13 U/L — SIGNIFICANT CHANGE UP (ref 0–41)
BILIRUB SERPL-MCNC: 0.5 MG/DL — SIGNIFICANT CHANGE UP (ref 0.2–1.2)
BUN SERPL-MCNC: 14 MG/DL — SIGNIFICANT CHANGE UP (ref 10–20)
CALCIUM SERPL-MCNC: 9.3 MG/DL — SIGNIFICANT CHANGE UP (ref 8.5–10.1)
CHLORIDE SERPL-SCNC: 98 MMOL/L — SIGNIFICANT CHANGE UP (ref 98–110)
CO2 SERPL-SCNC: 27 MMOL/L — SIGNIFICANT CHANGE UP (ref 17–32)
CREAT SERPL-MCNC: 0.8 MG/DL — SIGNIFICANT CHANGE UP (ref 0.7–1.5)
GLUCOSE BLDC GLUCOMTR-MCNC: 163 MG/DL — HIGH (ref 70–99)
GLUCOSE BLDC GLUCOMTR-MCNC: 172 MG/DL — HIGH (ref 70–99)
GLUCOSE BLDC GLUCOMTR-MCNC: 215 MG/DL — HIGH (ref 70–99)
GLUCOSE BLDC GLUCOMTR-MCNC: 81 MG/DL — SIGNIFICANT CHANGE UP (ref 70–99)
GLUCOSE SERPL-MCNC: 168 MG/DL — HIGH (ref 70–99)
HCT VFR BLD CALC: 36.8 % — LOW (ref 37–47)
HGB BLD-MCNC: 11.9 G/DL — LOW (ref 12–16)
MAGNESIUM SERPL-MCNC: 1.8 MG/DL — SIGNIFICANT CHANGE UP (ref 1.8–2.4)
MCHC RBC-ENTMCNC: 25.6 PG — LOW (ref 27–31)
MCHC RBC-ENTMCNC: 32.3 G/DL — SIGNIFICANT CHANGE UP (ref 32–37)
MCV RBC AUTO: 79.1 FL — LOW (ref 81–99)
NRBC # BLD: 0 /100 WBCS — SIGNIFICANT CHANGE UP (ref 0–0)
PLATELET # BLD AUTO: 165 K/UL — SIGNIFICANT CHANGE UP (ref 130–400)
POTASSIUM SERPL-MCNC: 3.3 MMOL/L — LOW (ref 3.5–5)
POTASSIUM SERPL-SCNC: 3.3 MMOL/L — LOW (ref 3.5–5)
PROT SERPL-MCNC: 7.1 G/DL — SIGNIFICANT CHANGE UP (ref 6–8)
RBC # BLD: 4.65 M/UL — SIGNIFICANT CHANGE UP (ref 4.2–5.4)
RBC # FLD: 14.7 % — HIGH (ref 11.5–14.5)
SODIUM SERPL-SCNC: 136 MMOL/L — SIGNIFICANT CHANGE UP (ref 135–146)
WBC # BLD: 4.05 K/UL — LOW (ref 4.8–10.8)
WBC # FLD AUTO: 4.05 K/UL — LOW (ref 4.8–10.8)

## 2021-05-26 PROCEDURE — 99233 SBSQ HOSP IP/OBS HIGH 50: CPT

## 2021-05-26 RX ORDER — INSULIN LISPRO 100/ML
7 VIAL (ML) SUBCUTANEOUS
Refills: 0 | Status: DISCONTINUED | OUTPATIENT
Start: 2021-05-26 | End: 2021-05-26

## 2021-05-26 RX ORDER — INSULIN LISPRO 100/ML
5 VIAL (ML) SUBCUTANEOUS
Refills: 0 | Status: DISCONTINUED | OUTPATIENT
Start: 2021-05-26 | End: 2021-06-01

## 2021-05-26 RX ORDER — INSULIN GLARGINE 100 [IU]/ML
21 INJECTION, SOLUTION SUBCUTANEOUS AT BEDTIME
Refills: 0 | Status: DISCONTINUED | OUTPATIENT
Start: 2021-05-26 | End: 2021-05-26

## 2021-05-26 RX ORDER — INSULIN GLARGINE 100 [IU]/ML
15 INJECTION, SOLUTION SUBCUTANEOUS AT BEDTIME
Refills: 0 | Status: DISCONTINUED | OUTPATIENT
Start: 2021-05-26 | End: 2021-06-01

## 2021-05-26 RX ADMIN — ATORVASTATIN CALCIUM 80 MILLIGRAM(S): 80 TABLET, FILM COATED ORAL at 21:14

## 2021-05-26 RX ADMIN — Medication 7 UNIT(S): at 11:35

## 2021-05-26 RX ADMIN — HEPARIN SODIUM 5000 UNIT(S): 5000 INJECTION INTRAVENOUS; SUBCUTANEOUS at 05:51

## 2021-05-26 RX ADMIN — CLOPIDOGREL BISULFATE 75 MILLIGRAM(S): 75 TABLET, FILM COATED ORAL at 11:35

## 2021-05-26 RX ADMIN — Medication 81 MILLIGRAM(S): at 11:35

## 2021-05-26 RX ADMIN — PANTOPRAZOLE SODIUM 40 MILLIGRAM(S): 20 TABLET, DELAYED RELEASE ORAL at 05:51

## 2021-05-26 RX ADMIN — INSULIN GLARGINE 15 UNIT(S): 100 INJECTION, SOLUTION SUBCUTANEOUS at 21:15

## 2021-05-26 RX ADMIN — Medication 137 MICROGRAM(S): at 05:51

## 2021-05-26 RX ADMIN — Medication 1: at 11:36

## 2021-05-26 RX ADMIN — Medication 2: at 08:08

## 2021-05-26 NOTE — CHART NOTE - NSCHARTNOTEFT_GEN_A_CORE
Transfer Note    Transfer from:     Transfer to: (  ) Medicine    (  ) Telemetry     (  ) RCU       (  ) CEU    (  ) VENT                        (  ) Palliative    ( X ) Stroke Unit    (  ) MICU    (  ) CCU    Signout given to:     ----------------------------------------------------------------------------------------------------------  HPI / ICU COURSE:    HPI:  66 F pmh DM, HTN, DLD, total thyroidectomy, hypothyroidism, unspecified heart murmur on baby ASA p/w CVA-like symptoms x 3d. 3 days ago patient states she had difficulty writing (R-handed) and her children told her her face "looked strange" and that she was speaking slowly. Additionally she was having R foot weakness described as having to "more her leg more". She made PCP appt for 5/24 and she was told to come to the ED. In the ED noted to have R hand numbness & R foot weakness. Pt denies any falls or head trauma. No ha, vision changes, neck pain, cp/sob, nv, abd pain. Hypertensive in ED to 220s/120s.   CTH showing more prominent microvascular changes since prior exam (1/16/19) but no evidence of territorial infarct. CTA H/N showing focal severe stenosis of proximal L intracranial vertebral artery, moderate stenosis of mild basilar artery, no aneurysm/dissection.    (24 May 2021 22:15)    Stroke code called, NIHSS 1. Patient was noted to have right sided weakness (3/5 RUE RLE) and left facial droop. Patient was loaded with ASA and Plavix and brought to  for tele monitoring. Patient remained stable for 24 hours without worsening deficits. MRI 48 hours later showed acute ischemic strokes in L CR and R occipital stroke. Patient upgraded to stroke unit for monitoring. Neuro recommended RONALD and ILR    --------------------------------------------------------------------------------------------------------  PMH/PSH:  PAST MEDICAL & SURGICAL HISTORY:  Diabetes    Hypertension        -------------------------------------------------------------------------------------------------------  PHYSICAL EXAM:  General: NAD  HEENT: Atraumatic  Respiratory: CTAB  Cardiac: RRR  Abdomen: soft, non-tender, non-distended  Extremities: warm and well-perfused  Neuro: A+Ox4. 3/5 RUE RLL. Left side facial droop    Vital Signs Last 24 Hrs  T(C): 36.2 (26 May 2021 12:41), Max: 36.7 (25 May 2021 16:05)  T(F): 97.1 (26 May 2021 12:41), Max: 98 (25 May 2021 16:05)  HR: 86 (26 May 2021 12:41) (67 - 87)  BP: 157/89 (26 May 2021 12:41) (157/89 - 183/85)  BP(mean): --  RR: 16 (26 May 2021 12:41) (16 - 18)  SpO2: 98% (25 May 2021 19:57) (97% - 98%)    I&O's Summary      --------------------------------------------------------------------------------------------------------  LABS:   CARDIAC MARKERS ( 25 May 2021 11:24 )  x     / <0.01 ng/mL / x     / x     / x      CARDIAC MARKERS ( 24 May 2021 18:01 )  x     / <0.01 ng/mL / x     / x     / x                                  11.9   4.05  )-----------( 165      ( 26 May 2021 06:16 )             36.8       05-26    136  |  98  |  14  ----------------------------<  168<H>  3.3<L>   |  27  |  0.8    Ca    9.3      26 May 2021 06:16  Mg     1.8     05-26    TPro  7.1  /  Alb  4.3  /  TBili  0.5  /  DBili  x   /  AST  13  /  ALT  8   /  AlkPhos  66  05-26      PTT - ( 25 May 2021 11:24 )  PTT:36.0 sec            Specimen Source:   Method Type:   Gram Stain:   Culture Results:   Bacteria:       -------------------------------------------------------------------------------------------------  RADIOLOGY:      ---------------------------------------------------------------------------------------------------  ASSESSMENT & PLAN:     66 F pmh DM, HTN, DLD, total thyroidectomy, hypothyroidism, unspecified heart murmur on baby ASA p/w CVA-like symptoms. Hypertensive in ED to 220s/120s.   CTH showing more prominent microvascular changes since prior exam (1/16/19) but no evidence of territorial infarct. CTA H/N showing focal severe stenosis of proximal L intracranial vertebral artery, moderate stenosis of mild basilar artery, no aneurysm/dissection.     #L hemifacial droop and R sided focal deficits 2/2 acute CVA vs TIA  - Neuro on board   - CTH: no territorial infarct but more prominent microvascular changes compared to prior exam on 5/24/19  - CTA H/N: Focal severe stenosis of proximal L intracranial vertebral artery, mod-sev stenosis of mid basilar artery  - MRI acute ischemic strokes in L CR and R occipital stroke   - Hx of heart murmur (unspecified)  - Not on blood thinners at home, only on ASA qd  - C/w ASA 81mg, atorvastatin 80 mg, and plavix 75mg qd  > RONALD pending  > Neurochecks q4h  - Troponin negative x 2  - A1c 8.2  - lipid panel , ,   - PT/OT/physiatry eval    #HTN   - c/w amlodipine 5 mg     #DM  - c/w lantus 21 units and lispro 7 unit    #DLD  - c/w statin 80 mg     #Hypothyroidism  #Hx of complete thyroidectomy  #Hyperenhancing soft tissue structures visualized in the anterior soft tissues of the neck and base of tongue.   >CT angio of neck: Hyperenhancing soft tissue structures visualized in the anterior soft tissues of the neck and base of tongue. Findings may reflect enhancing glandular tissue, ectopic thyroid or mass; this may be confirmed with nuclear medicine thyroid scan  - Holding  on thyroid scan. Spoke to radiology and patient needs 4 weeks of no iodine contrast in order to get scan. Used contrast yesterday. f/u OP   > C/w home levothyroxine    FULL CODE  Activity: IAT  Diet: DASH/TLC  DVT ppx: Heparin sq BID  GI ppx: Protonix  Dispo: Acute    FOR FOLLOW UP:  [ ] RONALD  [ ] ILR  [ ] Transfer Note    Transfer from:     Transfer to: (  ) Medicine    (  ) Telemetry     (  ) RCU       (  ) CEU    (  ) VENT                        (  ) Palliative    ( X ) Stroke Unit    (  ) MICU    (  ) CCU    Signout given to:     ----------------------------------------------------------------------------------------------------------  HPI / ICU COURSE:    HPI:  66 F pmh DM, HTN, DLD, total thyroidectomy, hypothyroidism, unspecified heart murmur on baby ASA p/w CVA-like symptoms x 3d. 3 days ago patient states she had difficulty writing (R-handed) and her children told her her face "looked strange" and that she was speaking slowly. Additionally she was having R foot weakness described as having to "more her leg more". She made PCP appt for 5/24 and she was told to come to the ED. In the ED noted to have R hand numbness & R foot weakness. Pt denies any falls or head trauma. No ha, vision changes, neck pain, cp/sob, nv, abd pain. Hypertensive in ED to 220s/120s.   CTH showing more prominent microvascular changes since prior exam (1/16/19) but no evidence of territorial infarct. CTA H/N showing focal severe stenosis of proximal L intracranial vertebral artery, moderate stenosis of mild basilar artery, no aneurysm/dissection.    (24 May 2021 22:15)    Stroke code called, NIHSS 1. Patient was noted to have right sided weakness (3/5 RUE RLE) and left facial droop. Patient was loaded with ASA and Plavix and brought to  for tele monitoring. Patient remained stable for 24 hours without worsening deficits. MRI 48 hours later showed acute ischemic strokes in L CR and R occipital stroke. Patient upgraded to stroke unit for monitoring. Neuro recommended ECHO and ILR    --------------------------------------------------------------------------------------------------------  PMH/PSH:  PAST MEDICAL & SURGICAL HISTORY:  Diabetes    Hypertension        -------------------------------------------------------------------------------------------------------  PHYSICAL EXAM:  General: NAD  HEENT: Atraumatic  Respiratory: CTAB  Cardiac: RRR  Abdomen: soft, non-tender, non-distended  Extremities: warm and well-perfused  Neuro: A+Ox4. 3/5 RUE RLL. Left side facial droop    Vital Signs Last 24 Hrs  T(C): 36.2 (26 May 2021 12:41), Max: 36.7 (25 May 2021 16:05)  T(F): 97.1 (26 May 2021 12:41), Max: 98 (25 May 2021 16:05)  HR: 86 (26 May 2021 12:41) (67 - 87)  BP: 157/89 (26 May 2021 12:41) (157/89 - 183/85)  BP(mean): --  RR: 16 (26 May 2021 12:41) (16 - 18)  SpO2: 98% (25 May 2021 19:57) (97% - 98%)    I&O's Summary      --------------------------------------------------------------------------------------------------------  LABS:   CARDIAC MARKERS ( 25 May 2021 11:24 )  x     / <0.01 ng/mL / x     / x     / x      CARDIAC MARKERS ( 24 May 2021 18:01 )  x     / <0.01 ng/mL / x     / x     / x                                  11.9   4.05  )-----------( 165      ( 26 May 2021 06:16 )             36.8       05-26    136  |  98  |  14  ----------------------------<  168<H>  3.3<L>   |  27  |  0.8    Ca    9.3      26 May 2021 06:16  Mg     1.8     05-26    TPro  7.1  /  Alb  4.3  /  TBili  0.5  /  DBili  x   /  AST  13  /  ALT  8   /  AlkPhos  66  05-26      PTT - ( 25 May 2021 11:24 )  PTT:36.0 sec            Specimen Source:   Method Type:   Gram Stain:   Culture Results:   Bacteria:       -------------------------------------------------------------------------------------------------  RADIOLOGY:      ---------------------------------------------------------------------------------------------------  ASSESSMENT & PLAN:     66 F pmh DM, HTN, DLD, total thyroidectomy, hypothyroidism, unspecified heart murmur on baby ASA p/w CVA-like symptoms. Hypertensive in ED to 220s/120s.   CTH showing more prominent microvascular changes since prior exam (1/16/19) but no evidence of territorial infarct. CTA H/N showing focal severe stenosis of proximal L intracranial vertebral artery, moderate stenosis of mild basilar artery, no aneurysm/dissection.     #L hemifacial droop and R sided focal deficits 2/2 acute CVA vs TIA  - Neuro on board   - CTH: no territorial infarct but more prominent microvascular changes compared to prior exam on 5/24/19  - CTA H/N: Focal severe stenosis of proximal L intracranial vertebral artery, mod-sev stenosis of mid basilar artery  - MRI acute ischemic strokes in L CR and R occipital stroke   - Hx of heart murmur (unspecified)  - Not on blood thinners at home, only on ASA qd  - C/w ASA 81mg, atorvastatin 80 mg, and plavix 75mg qd  > TTE w/ bubble pending  > Neurochecks q4h  - Troponin negative x 2  - A1c 8.2  - lipid panel , ,   - PT/OT/physiatry eval    #HTN   - c/w amlodipine 5 mg     #DM  - c/w lantus 21 units and lispro 7 unit    #DLD  - c/w statin 80 mg     #Hypothyroidism  #Hx of complete thyroidectomy  #Hyperenhancing soft tissue structures visualized in the anterior soft tissues of the neck and base of tongue.   >CT angio of neck: Hyperenhancing soft tissue structures visualized in the anterior soft tissues of the neck and base of tongue. Findings may reflect enhancing glandular tissue, ectopic thyroid or mass; this may be confirmed with nuclear medicine thyroid scan  - Holding  on thyroid scan. Spoke to radiology and patient needs 4 weeks of no iodine contrast in order to get scan. Used contrast yesterday. f/u OP   > C/w home levothyroxine    FULL CODE  Activity: IAT  Diet: DASH/TLC  DVT ppx: Heparin sq BID  GI ppx: Protonix  Dispo: Acute    FOR FOLLOW UP:  [ ] RONALD  [ ] ILR  [ ] Transfer Note    Transfer from:     Transfer to: (  ) Medicine    (  ) Telemetry     (  ) RCU       (  ) CEU    (  ) VENT                        (  ) Palliative    ( X ) Stroke Unit    (  ) MICU    (  ) CCU    Signout given to:     ----------------------------------------------------------------------------------------------------------  HPI / ICU COURSE:    HPI:  66 F pmh DM, HTN, DLD, total thyroidectomy, hypothyroidism, unspecified heart murmur on baby ASA p/w CVA-like symptoms x 3d. 3 days ago patient states she had difficulty writing (R-handed) and her children told her her face "looked strange" and that she was speaking slowly. Additionally she was having R foot weakness described as having to "more her leg more". She made PCP appt for 5/24 and she was told to come to the ED. In the ED noted to have R hand numbness & R foot weakness. Pt denies any falls or head trauma. No ha, vision changes, neck pain, cp/sob, nv, abd pain. Hypertensive in ED to 220s/120s.   CTH showing more prominent microvascular changes since prior exam (1/16/19) but no evidence of territorial infarct. CTA H/N showing focal severe stenosis of proximal L intracranial vertebral artery, moderate stenosis of mild basilar artery, no aneurysm/dissection.    (24 May 2021 22:15)    Stroke code called, NIHSS 1. Patient was noted to have right sided weakness (3/5 RUE RLE) and left facial droop. Patient was loaded with ASA and Plavix and brought to  for tele monitoring. Patient remained stable for 24 hours without worsening deficits. MRI 48 hours later showed acute ischemic strokes in L CR and R occipital stroke. Patient upgraded to stroke unit for monitoring. Neuro recommended ECHO and ILR    --------------------------------------------------------------------------------------------------------  PMH/PSH:  PAST MEDICAL & SURGICAL HISTORY:  Diabetes    Hypertension        -------------------------------------------------------------------------------------------------------  PHYSICAL EXAM:  General: NAD  HEENT: Atraumatic  Respiratory: CTAB  Cardiac: RRR  Abdomen: soft, non-tender, non-distended  Extremities: warm and well-perfused  Neuro: A+Ox4. 3/5 RUE RLL. Left side facial droop    Vital Signs Last 24 Hrs  T(C): 36.2 (26 May 2021 12:41), Max: 36.7 (25 May 2021 16:05)  T(F): 97.1 (26 May 2021 12:41), Max: 98 (25 May 2021 16:05)  HR: 86 (26 May 2021 12:41) (67 - 87)  BP: 157/89 (26 May 2021 12:41) (157/89 - 183/85)  BP(mean): --  RR: 16 (26 May 2021 12:41) (16 - 18)  SpO2: 98% (25 May 2021 19:57) (97% - 98%)    I&O's Summary      --------------------------------------------------------------------------------------------------------  LABS:   CARDIAC MARKERS ( 25 May 2021 11:24 )  x     / <0.01 ng/mL / x     / x     / x      CARDIAC MARKERS ( 24 May 2021 18:01 )  x     / <0.01 ng/mL / x     / x     / x                                  11.9   4.05  )-----------( 165      ( 26 May 2021 06:16 )             36.8       05-26    136  |  98  |  14  ----------------------------<  168<H>  3.3<L>   |  27  |  0.8    Ca    9.3      26 May 2021 06:16  Mg     1.8     05-26    TPro  7.1  /  Alb  4.3  /  TBili  0.5  /  DBili  x   /  AST  13  /  ALT  8   /  AlkPhos  66  05-26      PTT - ( 25 May 2021 11:24 )  PTT:36.0 sec            Specimen Source:   Method Type:   Gram Stain:   Culture Results:   Bacteria:       -------------------------------------------------------------------------------------------------  RADIOLOGY:      ---------------------------------------------------------------------------------------------------  ASSESSMENT & PLAN:     66 F pmh DM, HTN, DLD, total thyroidectomy, hypothyroidism, unspecified heart murmur on baby ASA p/w CVA-like symptoms. Hypertensive in ED to 220s/120s.   CTH showing more prominent microvascular changes since prior exam (1/16/19) but no evidence of territorial infarct. CTA H/N showing focal severe stenosis of proximal L intracranial vertebral artery, moderate stenosis of mild basilar artery, no aneurysm/dissection.     #L hemifacial droop and R sided focal deficits 2/2 acute CVA vs TIA  - Neuro on board   - CTH: no territorial infarct but more prominent microvascular changes compared to prior exam on 5/24/19  - CTA H/N: Focal severe stenosis of proximal L intracranial vertebral artery, mod-sev stenosis of mid basilar artery  - MRI acute ischemic strokes in L CR and R occipital stroke   - Hx of heart murmur (unspecified)  - Not on blood thinners at home, only on ASA qd  - C/w ASA 81mg, atorvastatin 80 mg, and plavix 75mg qd  > TTE w/ bubble pending  > Neurochecks q4h  - Troponin negative x 2  - A1c 8.2  - lipid panel , ,   - PT/OT/physiatry eval  - EP consulted for loop placement    #HTN   - c/w amlodipine 5 mg     #DM  - c/w lantus 21 units and lispro 7 unit    #DLD  - c/w statin 80 mg     #Hypothyroidism  #Hx of complete thyroidectomy  #Hyperenhancing soft tissue structures visualized in the anterior soft tissues of the neck and base of tongue.   >CT angio of neck: Hyperenhancing soft tissue structures visualized in the anterior soft tissues of the neck and base of tongue. Findings may reflect enhancing glandular tissue, ectopic thyroid or mass; this may be confirmed with nuclear medicine thyroid scan  - Holding  on thyroid scan. Spoke to radiology and patient needs 4 weeks of no iodine contrast in order to get scan. Used contrast yesterday. f/u OP   > C/w home levothyroxine    FULL CODE  Activity: IAT  Diet: DASH/TLC  DVT ppx: Heparin sq BID  GI ppx: Protonix  Dispo: Acute    FOR FOLLOW UP:  [ ] TTE  [ ] ILR  [ ]

## 2021-05-26 NOTE — CONSULT NOTE ADULT - SUBJECTIVE AND OBJECTIVE BOX
HPI:  66 F pmh DM, HTN, DLD, total thyroidectomy, hypothyroidism, unspecified heart murmur on baby ASA p/w CVA-like symptoms x 3d. 3 days ago patient states she had difficulty writing (R-handed) and her children told her her face "looked strange" and that she was speaking slowly. Additionally she was having R foot weakness described as having to "more her leg more". She made PCP appt for 5/24 and she was told to come to the ED. In the ED noted to have R hand numbness & R foot weakness. Pt denies any falls or head trauma. No ha, vision changes, neck pain, cp/sob, nv, abd pain. Hypertensive in ED to 220s/120s.   CTH showing more prominent microvascular changes since prior exam (1/16/19) but no evidence of territorial infarct. CTA H/N showing focal severe stenosis of proximal L intracranial vertebral artery, moderate stenosis of mild basilar artery, no aneurysm/dissection.         PAST MEDICAL & SURGICAL HISTORY:  Diabetes    Hypertension        Hospital Course:    TODAY'S SUBJECTIVE & REVIEW OF SYMPTOMS:     Constitutional WNL   Cardio WNL   Resp WNL   GI WNL  Heme WNL  Endo WNL  Skin WNL  MSK WNL  Neuro right side weakness/dysarthria  Cognitive WNL  Psych WNL      MEDICATIONS  (STANDING):  aspirin  chewable 81 milliGRAM(s) Oral daily  atorvastatin 80 milliGRAM(s) Oral at bedtime  clopidogrel Tablet 75 milliGRAM(s) Oral daily  dextrose 40% Gel 15 Gram(s) Oral once  dextrose 5%. 1000 milliLiter(s) (50 mL/Hr) IV Continuous <Continuous>  dextrose 5%. 1000 milliLiter(s) (100 mL/Hr) IV Continuous <Continuous>  dextrose 50% Injectable 25 Gram(s) IV Push once  dextrose 50% Injectable 12.5 Gram(s) IV Push once  dextrose 50% Injectable 25 Gram(s) IV Push once  glucagon  Injectable 1 milliGRAM(s) IntraMuscular once  heparin   Injectable 5000 Unit(s) SubCutaneous every 12 hours  insulin glargine Injectable (LANTUS) 21 Unit(s) SubCutaneous at bedtime  insulin lispro (ADMELOG) corrective regimen sliding scale   SubCutaneous three times a day before meals  insulin lispro Injectable (ADMELOG) 7 Unit(s) SubCutaneous three times a day before meals  levothyroxine 137 MICROGram(s) Oral daily  pantoprazole    Tablet 40 milliGRAM(s) Oral before breakfast    MEDICATIONS  (PRN):      FAMILY HISTORY:      Allergies    No Known Allergies    Intolerances        SOCIAL HISTORY:    [  ] Etoh  [  ] Smoking  [  ] Substance abuse     Home Environment:  [ x  ] Home Alone  [   ] Lives with Family  [   ] Home Health Aid    Dwelling:  [ x  ] Apartment  [   ] Private House  [   ] Adult Home  [   ] Skilled Nursing Facility      [   ] Short Term  [   ] Long Term  [   ] Stairs       Elevator [  x ]    FUNCTIONAL STATUS PTA: (Check all that apply)  Ambulation: [  x  ]Independent    [   ] Dependent     [   ] Non-Ambulatory  Assistive Device: [   ] SA Cane  [   ]  Q Cane  [   ] Walker  [   ]  Wheelchair  ADL : [ x  ] Independent  [    ]  Dependent       Vital Signs Last 24 Hrs  T(C): 36.2 (26 May 2021 12:41), Max: 36.7 (25 May 2021 16:05)  T(F): 97.1 (26 May 2021 12:41), Max: 98 (25 May 2021 16:05)  HR: 86 (26 May 2021 12:41) (67 - 87)  BP: 157/89 (26 May 2021 12:41) (157/89 - 183/85)  BP(mean): --  RR: 16 (26 May 2021 12:41) (16 - 18)  SpO2: 98% (25 May 2021 19:57) (97% - 98%)      PHYSICAL EXAM: Awake & Alert  GENERAL: NAD  HEAD:  Normocephalic  CHEST/LUNG: Clear   HEART: S1S2+  ABDOMEN: Soft, Nontender  EXTREMITIES:  no calf tenderness    NERVOUS SYSTEM:  Cranial Nerves 2-12 intact [   ] Abnormal  [ x  ]+ dysarthria  ROM: WFL all extremities [   ]  Abnormal [  x ]sightly limited right side  Motor Strength: WFL all extremities  [   ]  Abnormal [  x ]4+/5 right side  Sensation: intact to light touch [x   ] Abnormal [   ]    FUNCTIONAL STATUS:  Bed Mobility: Independent [ x  ]  Supervision [   ]  Needs Assistance [   ]  N/A [   ]  Transfers: Independent [  x ]  Supervision [   ]  Needs Assistance [   ]  N/A [   ]   Ambulation: Independent [ x  ]  Supervision [   ]  Needs Assistance [   ]  N/A [   ]  ADL: Independent [   ] Requires Assistance [   ] N/A [   ]      LABS:                        11.9   4.05  )-----------( 165      ( 26 May 2021 06:16 )             36.8     05-26    136  |  98  |  14  ----------------------------<  168<H>  3.3<L>   |  27  |  0.8    Ca    9.3      26 May 2021 06:16  Mg     1.8     05-26    TPro  7.1  /  Alb  4.3  /  TBili  0.5  /  DBili  x   /  AST  13  /  ALT  8   /  AlkPhos  66  05-26    PTT - ( 25 May 2021 11:24 )  PTT:36.0 sec      RADIOLOGY & ADDITIONAL STUDIES:

## 2021-05-26 NOTE — PROGRESS NOTE ADULT - SUBJECTIVE AND OBJECTIVE BOX
Stroke Progress Note:    LON JOE    1. Chief Complaint: right sided weakness    HPI:  66 F pmh DM, HTN, DLD, total thyroidectomy, hypothyroidism, unspecified heart murmur on baby ASA p/w CVA-like symptoms x 3d. 3 days ago patient states she had difficulty writing (R-handed) and her children told her her face "looked strange" and that she was speaking slowly. Additionally she was having R foot weakness described as having to "more her leg more". She made PCP appt for  and she was told to come to the ED. In the ED noted to have R hand numbness & R foot weakness. Pt denies any falls or head trauma. No ha, vision changes, neck pain, cp/sob, nv, abd pain. Hypertensive in ED to 220s/120s.   CTH showing more prominent microvascular changes since prior exam (19) but no evidence of territorial infarct. CTA H/N showing focal severe stenosis of proximal L intracranial vertebral artery, moderate stenosis of mild basilar artery, no aneurysm/dissection.    (24 May 2021 22:15)      2. Relevant PMH:   Prior ischemic stroke/TIA[ ], Afib [ ], CAD [ ], HTN [x ], DLD [ x], DM [x ], PVD [ ], Obesity [ ],   Sedentary lifestyle [ ], CHF [ ], ABDIAS [ ], Cancer Hx [ ].    3. Social History: Smoking [ ], Drug Use [ ], Alcohol Use [ ], Other [ ]    4. Possible Location of Stroke:    5. Relevant Brain Tissue Imaging: < from: MR Head No Cont (21 @ 21:32) >  EXAM:  MR BRAIN            PROCEDURE DATE:  2021            INTERPRETATION:  INDICATIONS:  Facial droop. Rule out stroke.    TECHNIQUE:  Multiplanar imaging was performed using T1 weighted, T2 weighted and FLAIR sequences.  Diffusion weightedand GRE images were also obtained.    COMPARISON EXAMINATION:  CT head dated 2021..    FINDINGS:  There are focal acute infarcts involving the left corona radiata as well as the right occipital lobe. There is no evidence of hemorrhagic transformation.    There is periventricular, scattered subcortical white matter as well as pontine T2 and FLAIR signal hyperintensity.    There is cortical volume loss. Ventricles are unremarkable.    There is no extra-axial fluid collection.    Major intracranial flow-voids are preserved.    The visualized orbits are unremarkable.    The sellar and suprasellar structures, and craniocervical junction are unremarkable.    The calvarium signal intensity is within normal limits.    There is sclerosis of the bilateral mastoid tips. Paranasal sinuses are unremarkable.    IMPRESSION:  Focal acute infarcts involving the left corona radiata and right occipital lobe without evidence of hemorrhagic transformation.    Moderate chronic microvascular changes.      < end of copied text >      6. Relevant Cerebrovascular Imaging:   CT Angio Neck w/ IV Cont:   EXAM:  CT ANGIO BRAIN (W)AW IC        EXAM:  CT ANGIO NECK (W)AW IC            PROCEDURE DATE:  2021            INTERPRETATION:  CLINICAL HISTORY / REASON FOR EXAM: Stroke code.    TECHNIQUE: CTA of the head and neck was obtained following the intravenous administration of 100 cc Omnipaque 350 contrast. Sagittal, coronal and axial reformatted images were obtained as well as 3-D volume rendered images.    COMPARISON: None.    FINDINGS:    CTA neck:  The aortic arch, origin of the great vessels and subclavian arteries are unremarkable. The bilateral common carotid arteries are unremarkable.    Minimal atherosclerotic calcifications along the bilateral carotid bifurcations without significant stenosis. 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 There is a retropharyngeal, medial course of the left cervical internal carotid artery.    The bilateral cervical vertebral arteries are unremarkable.    CTA head:  The intracranial internal carotid arteries are patent without significant stenosis. Scattered atherosclerotic calcifications along the bilateral cavernous segments.    The middle cerebral arteries and anterior cerebral arteries are unremarkable without significant stenosis.    There is focal severe stenosis of the proximal intradural left vertebral artery V4 segment. The intracranial right vertebral artery is unremarkable.    There are moderate to severe stenosis of the mid basilar artery.    The superior cerebellar arteries and posterior cerebral arteries are without significant stenosis.    Degenerative changes of the spine. Nonvisualization of the thyroid. There is a hyperenhancing 1.7 x 1.8cm structure within the anterior portion of the neck.    Additional smaller 0.8 x 1.5 cm hyperdense structure in the base of tongue and in the left lateral tracheal space, measuring 0.6 x 1 cm.    IMPRESSION:  -Focal severe stenosis of the proximal left intracranial vertebral artery.  -Moderate to severe stenosis of the mid basilar artery.  -No CTA evidence of aneurysm or dissection.  -Hyperenhancing soft tissue structures visualized in the anterior soft tissues of the neck and base of tongue. Findings may reflect enhancing glandular tissue, ectopic thyroid or mass; this may be confirmed with nuclear medicine thyroid scan.          SHAYLA BISHOP M.D., RESIDENT RADIOLOGIST  This document has been electronically signed.  ROHINI GALINDO MD; Attending Radiologist  This document has been electronically signed. May 24 2021 11:24PM (21 @ 22:09)     CT Angio Head w/ IV Cont:   EXAM:  CT ANGIO BRAIN (W)AW IC        EXAM:  CT ANGIO NECK (W)AW IC            PROCEDURE DATE:  2021            INTERPRETATION:  CLINICAL HISTORY / REASON FOR EXAM: Stroke code.    TECHNIQUE: CTA of the head and neck was obtained following the intravenous administration of 100 cc Omnipaque 350 contrast. Sagittal, coronal and axial reformatted images were obtained as well as 3-D volume rendered images.    COMPARISON: None.    FINDINGS:    CTA neck:  The aortic arch, origin of the great vessels and subclavian arteries are unremarkable. The bilateral common carotid arteries are unremarkable.    Minimal atherosclerotic calcifications along the bilateral carotid bifurcations without significant stenosis. 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 There is a retropharyngeal, medial course of the left cervical internal carotid artery.    The bilateral cervical vertebral arteries are unremarkable.    CTA head:  The intracranial internal carotid arteries are patent without significant stenosis. Scattered atherosclerotic calcifications along the bilateral cavernous segments.    The middle cerebral arteries and anterior cerebral arteries are unremarkable without significant stenosis.    There is focal severe stenosis of the proximal intradural left vertebral artery V4 segment. The intracranial right vertebral artery is unremarkable.    There are moderate to severe stenosis of the mid basilar artery.    The superior cerebellar arteries and posterior cerebral arteries are without significant stenosis.    Degenerative changes of the spine. Nonvisualization of the thyroid. There is a hyperenhancing 1.7 x 1.8cm structure within the anterior portion of the neck.    Additional smaller 0.8 x 1.5 cm hyperdense structure in the base of tongue and in the left lateral tracheal space, measuring 0.6 x 1 cm.    IMPRESSION:  -Focal severe stenosis of the proximal left intracranial vertebral artery.  -Moderate to severe stenosis of the mid basilar artery.  -No CTA evidence of aneurysm or dissection.  -Hyperenhancing soft tissue structures visualized in the anterior soft tissues of the neck and base of tongue. Findings may reflect enhancing glandular tissue, ectopic thyroid or mass; this may be confirmed with nuclear medicine thyroid scan.          SHAYLA BISHOP M.D., RESIDENT RADIOLOGIST  This document has been electronically signed.  ROHINI GALINDO MD; Attending Radiologist  This document has been electronically signed. May 24 2021 11:24PM (21 @ 22:08)            7. Relevant blood tests:      8. Relevant cardiac rhythm monitorin. Relevant Cardiac Structure: (TTE/RONALD +/-):[ ]No intracardiac thrombus/[ ] no vegetation/[ ]no [place]/EF:    Home Medications:  amLODIPine 5 mg oral tablet: 1 tab(s) orally once a day (25 May 2021 04:39)  aspirin 81 mg oral tablet: 1 tab(s) orally once a day (25 May 2021 04:42)  Crestor 5 mg oral tablet: 1 tab(s) orally once a day (25 May 2021 04:40)  folic acid 1 mg oral tablet: 1 tab(s) orally once a day (25 May 2021 04:40)  levothyroxine 137 mcg (0.137 mg) oral tablet: 1 tab(s) orally once a day (25 May 2021 04:41)  losartan-hydrochlorothiazide 100 mg-25 mg oral tablet: 1 tab(s) orally once a day (25 May 2021 04:41)  metFORMIN 1000 mg oral tablet: 1 tab(s) orally 2 times a day (25 May 2021 04:40)  Mobic 15 mg oral tablet: 1 tab(s) orally once a day, As Needed (25 May 2021 04:42)      MEDICATIONS  (STANDING):  aspirin  chewable 81 milliGRAM(s) Oral daily  atorvastatin 80 milliGRAM(s) Oral at bedtime  clopidogrel Tablet 75 milliGRAM(s) Oral daily  dextrose 40% Gel 15 Gram(s) Oral once  dextrose 5%. 1000 milliLiter(s) (50 mL/Hr) IV Continuous <Continuous>  dextrose 5%. 1000 milliLiter(s) (100 mL/Hr) IV Continuous <Continuous>  dextrose 50% Injectable 25 Gram(s) IV Push once  dextrose 50% Injectable 12.5 Gram(s) IV Push once  dextrose 50% Injectable 25 Gram(s) IV Push once  glucagon  Injectable 1 milliGRAM(s) IntraMuscular once  heparin   Injectable 5000 Unit(s) SubCutaneous every 12 hours  insulin glargine Injectable (LANTUS) 21 Unit(s) SubCutaneous at bedtime  insulin lispro (ADMELOG) corrective regimen sliding scale   SubCutaneous three times a day before meals  insulin lispro Injectable (ADMELOG) 7 Unit(s) SubCutaneous three times a day before meals  levothyroxine 137 MICROGram(s) Oral daily  pantoprazole    Tablet 40 milliGRAM(s) Oral before breakfast      10. PT/OT/Speech/Rehab/S&Sw/ Cognitive eval results and recommendations:    11. Exam:    Vital Signs Last 24 Hrs  T(C): 36.2 (26 May 2021 12:41), Max: 36.7 (25 May 2021 16:05)  T(F): 97.1 (26 May 2021 12:41), Max: 98 (25 May 2021 16:05)  HR: 86 (26 May 2021 12:41) (67 - 87)  BP: 157/89 (26 May 2021 12:41) (157/89 - 183/85)  BP(mean): --  RR: 16 (26 May 2021 12:41) (16 - 18)  SpO2: 98% (25 May 2021 19:57) (97% - 98%)    12.   NIH STROKE SCALE  Item	                                                        Score  1 a.	Level of Consciousness	               	0  1 b. LOC Questions	                                0  1 c.	LOC Commands	                               	0  2.	Best Gaze	                                        0  3.	Visual	                                                1  4.	Facial Palsy	                                        1  5 a.	Motor Arm - Left	                                0  5 b.	Motor Arm - Right	                        2  6 a.	Motor Leg - Left	                                0  6 b.	Motor Leg - Right	                                2  7.	Limb Ataxia	                                        0  8.	Sensory	                                                0  9.	Language	                                        0  10.	Dysarthria	                                        1  11.	Extinction and Inattention  	        0  ______________________________________  TOTAL	                                                        0    Total NIHSS on admission:      NIHSS yesterday:      NIHSS today: 7    mRS:  0 No symptoms at all  1 No significant disability despite symptoms; able to carry out all usual duties and activities without assistance  2 Slight disability; unable to carry out all previous activities, but able to look after own affairs  3 Moderate disability; requiring some help, but able to walk without assistance  4 Moderately severe disability; unable to walk without assistance and unable to attend to own bodily needs without assistance  5 Severe disability; bedridden, incontinent and requiring constant nursing care and attention  6 Dead      13. Impression: 66F with Hx of DM, HTN, HLD, on ASA 81 presents to ED for R sided weakness and facial droop noted by PCP today when she went for a follow up appointment. PCP sent her into ED for evaluation for new stroke. MRI brain with acute ischemic strokes in L CR and R occipital stroke s/o cardioembolic etiology. 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      Recomendations  - q4 stroke neurochecks  - continue Aspirin, Plavix, Statin  - RONALD, ILR  - PT/OT/Rehab    Case discussed with Dr. Rae             Stroke Progress Note:    LON JOE    1. Chief Complaint: right sided weakness    HPI:  66 F pmh DM, HTN, DLD, total thyroidectomy, hypothyroidism, unspecified heart murmur on baby ASA p/w CVA-like symptoms x 3d. 3 days ago patient states she had difficulty writing (R-handed) and her children told her her face "looked strange" and that she was speaking slowly. Additionally she was having R foot weakness described as having to "more her leg more". She made PCP appt for  and she was told to come to the ED. In the ED noted to have R hand numbness & R foot weakness. Pt denies any falls or head trauma. No ha, vision changes, neck pain, cp/sob, nv, abd pain. Hypertensive in ED to 220s/120s.   CTH showing more prominent microvascular changes since prior exam (19) but no evidence of territorial infarct. CTA H/N showing focal severe stenosis of proximal L intracranial vertebral artery, moderate stenosis of mild basilar artery, no aneurysm/dissection.    (24 May 2021 22:15)      2. Relevant PMH:   Prior ischemic stroke/TIA[ ], Afib [ ], CAD [ ], HTN [x ], DLD [ x], DM [x ], PVD [ ], Obesity [ ],   Sedentary lifestyle [ ], CHF [ ], ABDIAS [ ], Cancer Hx [ ].    3. Social History: Smoking [ ], Drug Use [ ], Alcohol Use [ ], Other [ ]    4. Possible Location of Stroke:    5. Relevant Brain Tissue Imaging: < from: MR Head No Cont (21 @ 21:32) >  EXAM:  MR BRAIN            PROCEDURE DATE:  2021            INTERPRETATION:  INDICATIONS:  Facial droop. Rule out stroke.    TECHNIQUE:  Multiplanar imaging was performed using T1 weighted, T2 weighted and FLAIR sequences.  Diffusion weightedand GRE images were also obtained.    COMPARISON EXAMINATION:  CT head dated 2021..    FINDINGS:  There are focal acute infarcts involving the left corona radiata as well as the right occipital lobe. There is no evidence of hemorrhagic transformation.    There is periventricular, scattered subcortical white matter as well as pontine T2 and FLAIR signal hyperintensity.    There is cortical volume loss. Ventricles are unremarkable.    There is no extra-axial fluid collection.    Major intracranial flow-voids are preserved.    The visualized orbits are unremarkable.    The sellar and suprasellar structures, and craniocervical junction are unremarkable.    The calvarium signal intensity is within normal limits.    There is sclerosis of the bilateral mastoid tips. Paranasal sinuses are unremarkable.    IMPRESSION:  Focal acute infarcts involving the left corona radiata and right occipital lobe without evidence of hemorrhagic transformation.    Moderate chronic microvascular changes.      < end of copied text >      6. Relevant Cerebrovascular Imaging:   CT Angio Neck w/ IV Cont:   EXAM:  CT ANGIO BRAIN (W)AW IC        EXAM:  CT ANGIO NECK (W)AW IC            PROCEDURE DATE:  2021            INTERPRETATION:  CLINICAL HISTORY / REASON FOR EXAM: Stroke code.    TECHNIQUE: CTA of the head and neck was obtained following the intravenous administration of 100 cc Omnipaque 350 contrast. Sagittal, coronal and axial reformatted images were obtained as well as 3-D volume rendered images.    COMPARISON: None.    FINDINGS:    CTA neck:  The aortic arch, origin of the great vessels and subclavian arteries are unremarkable. The bilateral common carotid arteries are unremarkable.    Minimal atherosclerotic calcifications along the bilateral carotid bifurcations without significant stenosis. 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 There is a retropharyngeal, medial course of the left cervical internal carotid artery.    The bilateral cervical vertebral arteries are unremarkable.    CTA head:  The intracranial internal carotid arteries are patent without significant stenosis. Scattered atherosclerotic calcifications along the bilateral cavernous segments.    The middle cerebral arteries and anterior cerebral arteries are unremarkable without significant stenosis.    There is focal severe stenosis of the proximal intradural left vertebral artery V4 segment. The intracranial right vertebral artery is unremarkable.    There are moderate to severe stenosis of the mid basilar artery.    The superior cerebellar arteries and posterior cerebral arteries are without significant stenosis.    Degenerative changes of the spine. Nonvisualization of the thyroid. There is a hyperenhancing 1.7 x 1.8cm structure within the anterior portion of the neck.    Additional smaller 0.8 x 1.5 cm hyperdense structure in the base of tongue and in the left lateral tracheal space, measuring 0.6 x 1 cm.    IMPRESSION:  -Focal severe stenosis of the proximal left intracranial vertebral artery.  -Moderate to severe stenosis of the mid basilar artery.  -No CTA evidence of aneurysm or dissection.  -Hyperenhancing soft tissue structures visualized in the anterior soft tissues of the neck and base of tongue. Findings may reflect enhancing glandular tissue, ectopic thyroid or mass; this may be confirmed with nuclear medicine thyroid scan.          SHAYLA BISHOP M.D., RESIDENT RADIOLOGIST  This document has been electronically signed.  ROHINI GALINDO MD; Attending Radiologist  This document has been electronically signed. May 24 2021 11:24PM (21 @ 22:09)     CT Angio Head w/ IV Cont:   EXAM:  CT ANGIO BRAIN (W)AW IC        EXAM:  CT ANGIO NECK (W)AW IC            PROCEDURE DATE:  2021            INTERPRETATION:  CLINICAL HISTORY / REASON FOR EXAM: Stroke code.    TECHNIQUE: CTA of the head and neck was obtained following the intravenous administration of 100 cc Omnipaque 350 contrast. Sagittal, coronal and axial reformatted images were obtained as well as 3-D volume rendered images.    COMPARISON: None.    FINDINGS:    CTA neck:  The aortic arch, origin of the great vessels and subclavian arteries are unremarkable. The bilateral common carotid arteries are unremarkable.    Minimal atherosclerotic calcifications along the bilateral carotid bifurcations without significant stenosis. 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 There is a retropharyngeal, medial course of the left cervical internal carotid artery.    The bilateral cervical vertebral arteries are unremarkable.    CTA head:  The intracranial internal carotid arteries are patent without significant stenosis. Scattered atherosclerotic calcifications along the bilateral cavernous segments.    The middle cerebral arteries and anterior cerebral arteries are unremarkable without significant stenosis.    There is focal severe stenosis of the proximal intradural left vertebral artery V4 segment. The intracranial right vertebral artery is unremarkable.    There are moderate to severe stenosis of the mid basilar artery.    The superior cerebellar arteries and posterior cerebral arteries are without significant stenosis.    Degenerative changes of the spine. Nonvisualization of the thyroid. There is a hyperenhancing 1.7 x 1.8cm structure within the anterior portion of the neck.    Additional smaller 0.8 x 1.5 cm hyperdense structure in the base of tongue and in the left lateral tracheal space, measuring 0.6 x 1 cm.    IMPRESSION:  -Focal severe stenosis of the proximal left intracranial vertebral artery.  -Moderate to severe stenosis of the mid basilar artery.  -No CTA evidence of aneurysm or dissection.  -Hyperenhancing soft tissue structures visualized in the anterior soft tissues of the neck and base of tongue. Findings may reflect enhancing glandular tissue, ectopic thyroid or mass; this may be confirmed with nuclear medicine thyroid scan.          SHAYLA BISHOP M.D., RESIDENT RADIOLOGIST  This document has been electronically signed.  ROHINI GALINDO MD; Attending Radiologist  This document has been electronically signed. May 24 2021 11:24PM (21 @ 22:08)            7. Relevant blood tests:      8. Relevant cardiac rhythm monitorin. Relevant Cardiac Structure: (TTE/RONALD +/-):[ ]No intracardiac thrombus/[ ] no vegetation/[ ]no [place]/EF:    Home Medications:  amLODIPine 5 mg oral tablet: 1 tab(s) orally once a day (25 May 2021 04:39)  aspirin 81 mg oral tablet: 1 tab(s) orally once a day (25 May 2021 04:42)  Crestor 5 mg oral tablet: 1 tab(s) orally once a day (25 May 2021 04:40)  folic acid 1 mg oral tablet: 1 tab(s) orally once a day (25 May 2021 04:40)  levothyroxine 137 mcg (0.137 mg) oral tablet: 1 tab(s) orally once a day (25 May 2021 04:41)  losartan-hydrochlorothiazide 100 mg-25 mg oral tablet: 1 tab(s) orally once a day (25 May 2021 04:41)  metFORMIN 1000 mg oral tablet: 1 tab(s) orally 2 times a day (25 May 2021 04:40)  Mobic 15 mg oral tablet: 1 tab(s) orally once a day, As Needed (25 May 2021 04:42)      MEDICATIONS  (STANDING):  aspirin  chewable 81 milliGRAM(s) Oral daily  atorvastatin 80 milliGRAM(s) Oral at bedtime  clopidogrel Tablet 75 milliGRAM(s) Oral daily  dextrose 40% Gel 15 Gram(s) Oral once  dextrose 5%. 1000 milliLiter(s) (50 mL/Hr) IV Continuous <Continuous>  dextrose 5%. 1000 milliLiter(s) (100 mL/Hr) IV Continuous <Continuous>  dextrose 50% Injectable 25 Gram(s) IV Push once  dextrose 50% Injectable 12.5 Gram(s) IV Push once  dextrose 50% Injectable 25 Gram(s) IV Push once  glucagon  Injectable 1 milliGRAM(s) IntraMuscular once  heparin   Injectable 5000 Unit(s) SubCutaneous every 12 hours  insulin glargine Injectable (LANTUS) 21 Unit(s) SubCutaneous at bedtime  insulin lispro (ADMELOG) corrective regimen sliding scale   SubCutaneous three times a day before meals  insulin lispro Injectable (ADMELOG) 7 Unit(s) SubCutaneous three times a day before meals  levothyroxine 137 MICROGram(s) Oral daily  pantoprazole    Tablet 40 milliGRAM(s) Oral before breakfast      10. PT/OT/Speech/Rehab/S&Sw/ Cognitive eval results and recommendations:    11. Exam:    Vital Signs Last 24 Hrs  T(C): 36.2 (26 May 2021 12:41), Max: 36.7 (25 May 2021 16:05)  T(F): 97.1 (26 May 2021 12:41), Max: 98 (25 May 2021 16:05)  HR: 86 (26 May 2021 12:41) (67 - 87)  BP: 157/89 (26 May 2021 12:41) (157/89 - 183/85)  BP(mean): --  RR: 16 (26 May 2021 12:41) (16 - 18)  SpO2: 98% (25 May 2021 19:57) (97% - 98%)    12.   NIH STROKE SCALE  Item	                                                        Score  1 a.	Level of Consciousness	               	0  1 b. LOC Questions	                                0  1 c.	LOC Commands	                               	0  2.	Best Gaze	                                        0  3.	Visual	                                                1  4.	Facial Palsy	                                        1  5 a.	Motor Arm - Left	                                0  5 b.	Motor Arm - Right	                        1  6 a.	Motor Leg - Left	                                0  6 b.	Motor Leg - Right	                                0  7.	Limb Ataxia	                                        0  8.	Sensory	                                                0  9.	Language	                                        0  10.	Dysarthria	                                        1  11.	Extinction and Inattention  	        0  ______________________________________  TOTAL	                                                        4    Total NIHSS on admission:      NIHSS yesterday:      NIHSS today: 4    mRS:  0 No symptoms at all  1 No significant disability despite symptoms; able to carry out all usual duties and activities without assistance  2 Slight disability; unable to carry out all previous activities, but able to look after own affairs  3 Moderate disability; requiring some help, but able to walk without assistance  4 Moderately severe disability; unable to walk without assistance and unable to attend to own bodily needs without assistance  5 Severe disability; bedridden, incontinent and requiring constant nursing care and attention  6 Dead      13. Impression: 66F with Hx of DM, HTN, HLD, on ASA 81 presents to ED for R sided weakness and facial droop noted by PCP today when she went for a follow up appointment. PCP sent her into ED for evaluation for new stroke. MRI brain with acute ischemic strokes in L CR and R occipital stroke s/o cardioembolic etiology. 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      Recomendations  - q4 stroke neurochecks  - continue Aspirin, Plavix, Statin  - RONALD, ILR  - PT/OT/Rehab    Case discussed with Dr. Rae

## 2021-05-26 NOTE — PROGRESS NOTE ADULT - SUBJECTIVE AND OBJECTIVE BOX
JOE FORREST  66y Female    CHIEF COMPLAINT:    Patient is a 66y old  Female who presents with a chief complaint of Facial droop (25 May 2021 09:53)      INTERVAL HPI/OVERNIGHT EVENTS:    Patient seen and examined.    ROS: All other systems are negative.    Vital Signs:    T(F): 96.9 (21 @ 05:42), Max: 98 (21 @ 16:05)  HR: 74 (21 @ 05:42) (67 - 87)  BP: 164/95 (21 @ 05:42) (161/94 - 183/85)  RR: 18 (21 @ 05:42) (18 - 18)  SpO2: 98% (21 @ 19:57) (97% - 98%)  I&O's Summary    Daily Height in cm: 162.56 (25 May 2021 11:39)    Daily Weight in k.6 (26 May 2021 05:42)  CAPILLARY BLOOD GLUCOSE      POCT Blood Glucose.: 160 mg/dL (25 May 2021 22:01)  POCT Blood Glucose.: 173 mg/dL (25 May 2021 16:31)  POCT Blood Glucose.: 205 mg/dL (25 May 2021 11:29)  POCT Blood Glucose.: 175 mg/dL (25 May 2021 08:22)      PHYSICAL EXAM:    GENERAL:  NAD  SKIN: No rashes or lesions  HENT: Atraumatic. Normocephalic. PERRL. Moist membranes.  NECK: Supple, No JVD. No lymphadenopathy.  PULMONARY: CTA B/L. No wheezing. No rales  CVS: Normal S1, S2. Rate and Rhythm are regular. No murmurs.  ABDOMEN/GI: Soft, Nontender, Nondistended; BS present  EXTREMITIES: Peripheral pulses intact. No edema B/L LE.  NEUROLOGIC:  No motor or sensory deficit.  PSYCH: Alert & oriented x 3    Consultant(s) Notes Reviewed:  [x ] YES  [ ] NO  Care Discussed with Consultants/Other Providers [ x] YES  [ ] NO    EKG reviewed  Telemetry reviewed    LABS:                        11.9   4.05  )-----------( 165      ( 26 May 2021 06:16 )             36.8         135  |  98  |  12  ----------------------------<  202<H>  3.4<L>   |  26  |  0.8    Ca    9.2      25 May 2021 11:24    TPro  7.7  /  Alb  4.8  /  TBili  0.2  /  DBili  x   /  AST  18  /  ALT  9   /  AlkPhos  69      PTT - ( 25 May 2021 11:24 )  PTT:36.0 sec    Trop <0.01, CKMB --, CK --, 21 @ 11:24  Trop <0.01, CKMB --, CK --, 21 @ 18:01        RADIOLOGY & ADDITIONAL TESTS:    < from: CT Angio Neck w/ IV Cont (21 @ 22:09) >    IMPRESSION:  -Focal severe stenosis of the proximal left intracranial vertebral artery.  -Moderate to severe stenosis of the mid basilar artery.  -No CTA evidence of aneurysm or dissection.  -Hyperenhancing soft tissue structures visualized in the anterior soft tissues of the neck and base of tongue. Findings may reflect enhancing glandular tissue, ectopic thyroid or mass; this may be confirmed with nuclear medicine thyroid scan.      < end of copied text >  < from: CT Head No Cont (21 @ 21:07) >    Impression:      More prominent microvascular changes since the prior exam.    No evidence of territorial infarct.    < end of copied text >    Imaging or report Personally Reviewed:  [ ] YES  [ ] NO    Medications:  Standing  aspirin  chewable 81 milliGRAM(s) Oral daily  atorvastatin 80 milliGRAM(s) Oral at bedtime  clopidogrel Tablet 75 milliGRAM(s) Oral daily  dextrose 40% Gel 15 Gram(s) Oral once  dextrose 5%. 1000 milliLiter(s) IV Continuous <Continuous>  dextrose 5%. 1000 milliLiter(s) IV Continuous <Continuous>  dextrose 50% Injectable 25 Gram(s) IV Push once  dextrose 50% Injectable 12.5 Gram(s) IV Push once  dextrose 50% Injectable 25 Gram(s) IV Push once  glucagon  Injectable 1 milliGRAM(s) IntraMuscular once  heparin   Injectable 5000 Unit(s) SubCutaneous every 12 hours  insulin lispro (ADMELOG) corrective regimen sliding scale   SubCutaneous three times a day before meals  levothyroxine 137 MICROGram(s) Oral daily  pantoprazole    Tablet 40 milliGRAM(s) Oral before breakfast    PRN Meds      Case discussed with resident    Care discussed with pt/family           JOE FORREST  66y Female    CHIEF COMPLAINT:    Patient is a 66y old  Female who presents with a chief complaint of Facial droop (25 May 2021 09:53)      INTERVAL HPI/OVERNIGHT EVENTS:    Patient seen and examined. Denies any weakness. No slurred speech. No dizziness.     ROS: All other systems are negative.    Vital Signs:    T(F): 96.9 (21 @ 05:42), Max: 98 (21 @ 16:05)  HR: 74 (21 @ 05:42) (67 - 87)  BP: 164/95 (21 @ 05:42) (161/94 - 183/85)  RR: 18 (21 @ 05:42) (18 - 18)  SpO2: 98% (21 @ 19:57) (97% - 98%)  I&O's Summary    Daily Height in cm: 162.56 (25 May 2021 11:39)    Daily Weight in k.6 (26 May 2021 05:42)  CAPILLARY BLOOD GLUCOSE      POCT Blood Glucose.: 160 mg/dL (25 May 2021 22:01)  POCT Blood Glucose.: 173 mg/dL (25 May 2021 16:31)  POCT Blood Glucose.: 205 mg/dL (25 May 2021 11:29)  POCT Blood Glucose.: 175 mg/dL (25 May 2021 08:22)      PHYSICAL EXAM:    GENERAL:  NAD  SKIN: No rashes or lesions  HENT: Atraumatic. Normocephalic. PERRL. Moist membranes.  NECK: Supple, No JVD. No lymphadenopathy.  PULMONARY: CTA B/L. No wheezing. No rales  CVS: Normal S1, S2. Rate and Rhythm are regular. No murmurs.  ABDOMEN/GI: Soft, Nontender, Nondistended; BS present  EXTREMITIES: Peripheral pulses intact. No edema B/L LE.  NEUROLOGIC:  RUE power 4+/5  PSYCH: Alert & oriented x 3    Consultant(s) Notes Reviewed:  [x ] YES  [ ] NO  Care Discussed with Consultants/Other Providers [ x] YES  [ ] NO    EKG reviewed  Telemetry reviewed    LABS:                        11.9   4.05  )-----------( 165      ( 26 May 2021 06:16 )             36.8     05-25    135  |  98  |  12  ----------------------------<  202<H>  3.4<L>   |  26  |  0.8    Ca    9.2      25 May 2021 11:24    TPro  7.7  /  Alb  4.8  /  TBili  0.2  /  DBili  x   /  AST  18  /  ALT  9   /  AlkPhos  69      PTT - ( 25 May 2021 11:24 )  PTT:36.0 sec    Trop <0.01, CKMB --, CK --, 21 @ 11:24  Trop <0.01, CKMB --, CK --, 21 @ 18:01        RADIOLOGY & ADDITIONAL TESTS:    < from: CT Angio Neck w/ IV Cont (21 @ 22:09) >    IMPRESSION:  -Focal severe stenosis of the proximal left intracranial vertebral artery.  -Moderate to severe stenosis of the mid basilar artery.  -No CTA evidence of aneurysm or dissection.  -Hyperenhancing soft tissue structures visualized in the anterior soft tissues of the neck and base of tongue. Findings may reflect enhancing glandular tissue, ectopic thyroid or mass; this may be confirmed with nuclear medicine thyroid scan.      < end of copied text >  < from: CT Head No Cont (21 @ 21:07) >    Impression:      More prominent microvascular changes since the prior exam.    No evidence of territorial infarct.    < end of copied text >  < from: MR Head No Cont (21 @ 21:32) >    IMPRESSION:  Focal acute infarcts involving the left corona radiata and right occipital lobe without evidence of hemorrhagic transformation.    Moderate chronic microvascular changes.    < end of copied text >    Imaging or report Personally Reviewed:  [ ] YES  [ ] NO    Medications:  Standing  aspirin  chewable 81 milliGRAM(s) Oral daily  atorvastatin 80 milliGRAM(s) Oral at bedtime  clopidogrel Tablet 75 milliGRAM(s) Oral daily  dextrose 40% Gel 15 Gram(s) Oral once  dextrose 5%. 1000 milliLiter(s) IV Continuous <Continuous>  dextrose 5%. 1000 milliLiter(s) IV Continuous <Continuous>  dextrose 50% Injectable 25 Gram(s) IV Push once  dextrose 50% Injectable 12.5 Gram(s) IV Push once  dextrose 50% Injectable 25 Gram(s) IV Push once  glucagon  Injectable 1 milliGRAM(s) IntraMuscular once  heparin   Injectable 5000 Unit(s) SubCutaneous every 12 hours  insulin lispro (ADMELOG) corrective regimen sliding scale   SubCutaneous three times a day before meals  levothyroxine 137 MICROGram(s) Oral daily  pantoprazole    Tablet 40 milliGRAM(s) Oral before breakfast    PRN Meds      Case discussed with resident    Care discussed with pt/family

## 2021-05-26 NOTE — CONSULT NOTE ADULT - ASSESSMENT
IMPRESSION: Rehab of cva / right hemiparesis / dysarthria / high functioning    PRECAUTIONS: [   ] Cardiac  [   ] Respiratory  [   ] Seizures [   ] Contact Isolation  [   ] Droplet Isolation  [   ] Other    Weight Bearing Status:     RECOMMENDATION:    Out of Bed to Chair     DVT/Decubiti Prophylaxis    REHAB PLAN:     [  x  ] Bedside P/T 3-5 times a week   [  x  ]   Bedside O/T  2-3 times a week             [  x  ] Speech Therapy               [ x   ]  No Rehab Therapy Indicated   Conditioning/ROM                                    ADL  Bed Mobility                                               Conditioning/ROM  Transfers                                                     Bed Mobility  Sitting /Standing Balance                         Transfers                                        Gait Training                                               Sitting/Standing Balance  Stair Training [   ]Applicable                    Home equipment Eval                                                                        Splinting  [   ] Only      GOALS:   ADL   [  x  ]   Independent                    Transfers  [ x   ] Independent                          Ambulation  [x    ] Independent     [ x    ] With device                            [    ]  CG                                                         [    ]  CG                                                                  [    ] CG                            [    ] Min A                                                   [    ] Min A                                                              [    ] Min  A          DISCHARGE PLAN:   [    ]  Good candidate for Intensive Rehabilitation/Hospital based                                             Will tolerate 3hrs Intensive Rehab Daily                                       [     ]  Short Term Rehab in Skilled Nursing Facility                                       [   x  ]  Home with Outpatient or  services                                         [     ]  Possible Candidate for Intensive Hospital based Rehab

## 2021-05-26 NOTE — PROGRESS NOTE ADULT - ASSESSMENT
66 F pmh DM, HTN, DLD, total thyroidectomy, hypothyroidism, unspecified heart murmur on baby ASA p/w CVA-like symptoms x 3d. 3 days ago patient states she had difficulty writing (R-handed) and her children told her her face "looked strange" and that she was speaking slowly.      left acute CVA with RUE numbness and weakness  HTN / DL  DM-2           PLAN:    ·	Cont tele  ·	Neuro eval noted. Recommended brain MRI & MRA  ·	Cont ASA, Plavix & Lipiror  ·	CTA neck showed focal severe stenosis of the proximal L intracranial vertebral artery 66 F pmh DM, HTN, DLD, total thyroidectomy, hypothyroidism, unspecified heart murmur on baby ASA p/w CVA-like symptoms x 3d. 3 days ago patient states she had difficulty writing (R-handed) and her children told her her face "looked strange" and that she was speaking slowly.      left acute CVA with RUE numbness and weakness  HTN / DL  DM-2  Anterior neck soft tissue mass           PLAN:    ·	Cont tele  ·	Brain MRI showed focal acute infarcts involving corona radiata and R occipital lobe  ·	Neuro F/U. If the pt needs RONALD & loop recorder to R/O embolic stroke.   ·	Cont ASA, Plavix & Lipiror  ·	CTA neck showed focal severe stenosis of the proximal L intracranial vertebral artery  ·	Thyroid scan for anterior neck to R/O ectopic thyroid vs mass  ·	Monitor FS. On Insulin sliding scale.     Progress Note Handoff    Pending (specify):  Consults_Neuro F/U________, Tests_Thyroid scan_______, Test Results_______, Other_________  Family discussion:  Disposition: Home___/SNF___/Other________/Unknown at this time________    Marcelo Martinez MD  Spectra: [phone]

## 2021-05-27 LAB
ANION GAP SERPL CALC-SCNC: 12 MMOL/L — SIGNIFICANT CHANGE UP (ref 7–14)
BASOPHILS # BLD AUTO: 0.04 K/UL — SIGNIFICANT CHANGE UP (ref 0–0.2)
BASOPHILS NFR BLD AUTO: 1 % — SIGNIFICANT CHANGE UP (ref 0–1)
BUN SERPL-MCNC: 15 MG/DL — SIGNIFICANT CHANGE UP (ref 10–20)
CALCIUM SERPL-MCNC: 9.1 MG/DL — SIGNIFICANT CHANGE UP (ref 8.5–10.1)
CHLORIDE SERPL-SCNC: 102 MMOL/L — SIGNIFICANT CHANGE UP (ref 98–110)
CO2 SERPL-SCNC: 27 MMOL/L — SIGNIFICANT CHANGE UP (ref 17–32)
CREAT SERPL-MCNC: 0.8 MG/DL — SIGNIFICANT CHANGE UP (ref 0.7–1.5)
EOSINOPHIL # BLD AUTO: 0.04 K/UL — SIGNIFICANT CHANGE UP (ref 0–0.7)
EOSINOPHIL NFR BLD AUTO: 1 % — SIGNIFICANT CHANGE UP (ref 0–8)
GLUCOSE BLDC GLUCOMTR-MCNC: 129 MG/DL — HIGH (ref 70–99)
GLUCOSE BLDC GLUCOMTR-MCNC: 141 MG/DL — HIGH (ref 70–99)
GLUCOSE BLDC GLUCOMTR-MCNC: 152 MG/DL — HIGH (ref 70–99)
GLUCOSE BLDC GLUCOMTR-MCNC: 172 MG/DL — HIGH (ref 70–99)
GLUCOSE SERPL-MCNC: 160 MG/DL — HIGH (ref 70–99)
HCT VFR BLD CALC: 37.2 % — SIGNIFICANT CHANGE UP (ref 37–47)
HGB BLD-MCNC: 11.8 G/DL — LOW (ref 12–16)
IMM GRANULOCYTES NFR BLD AUTO: 0.3 % — SIGNIFICANT CHANGE UP (ref 0.1–0.3)
LYMPHOCYTES # BLD AUTO: 1.92 K/UL — SIGNIFICANT CHANGE UP (ref 1.2–3.4)
LYMPHOCYTES # BLD AUTO: 48.5 % — SIGNIFICANT CHANGE UP (ref 20.5–51.1)
MAGNESIUM SERPL-MCNC: 1.8 MG/DL — SIGNIFICANT CHANGE UP (ref 1.8–2.4)
MCHC RBC-ENTMCNC: 25.2 PG — LOW (ref 27–31)
MCHC RBC-ENTMCNC: 31.7 G/DL — LOW (ref 32–37)
MCV RBC AUTO: 79.5 FL — LOW (ref 81–99)
MONOCYTES # BLD AUTO: 0.37 K/UL — SIGNIFICANT CHANGE UP (ref 0.1–0.6)
MONOCYTES NFR BLD AUTO: 9.3 % — SIGNIFICANT CHANGE UP (ref 1.7–9.3)
NEUTROPHILS # BLD AUTO: 1.58 K/UL — SIGNIFICANT CHANGE UP (ref 1.4–6.5)
NEUTROPHILS NFR BLD AUTO: 39.9 % — LOW (ref 42.2–75.2)
NRBC # BLD: 0 /100 WBCS — SIGNIFICANT CHANGE UP (ref 0–0)
PLATELET # BLD AUTO: 166 K/UL — SIGNIFICANT CHANGE UP (ref 130–400)
POTASSIUM SERPL-MCNC: 3.5 MMOL/L — SIGNIFICANT CHANGE UP (ref 3.5–5)
POTASSIUM SERPL-SCNC: 3.5 MMOL/L — SIGNIFICANT CHANGE UP (ref 3.5–5)
RBC # BLD: 4.68 M/UL — SIGNIFICANT CHANGE UP (ref 4.2–5.4)
RBC # FLD: 14.4 % — SIGNIFICANT CHANGE UP (ref 11.5–14.5)
SARS-COV-2 RNA SPEC QL NAA+PROBE: SIGNIFICANT CHANGE UP
SODIUM SERPL-SCNC: 141 MMOL/L — SIGNIFICANT CHANGE UP (ref 135–146)
WBC # BLD: 3.96 K/UL — LOW (ref 4.8–10.8)
WBC # FLD AUTO: 3.96 K/UL — LOW (ref 4.8–10.8)

## 2021-05-27 PROCEDURE — 99232 SBSQ HOSP IP/OBS MODERATE 35: CPT

## 2021-05-27 PROCEDURE — 99223 1ST HOSP IP/OBS HIGH 75: CPT | Mod: 57

## 2021-05-27 RX ORDER — AMLODIPINE BESYLATE 2.5 MG/1
5 TABLET ORAL DAILY
Refills: 0 | Status: DISCONTINUED | OUTPATIENT
Start: 2021-05-27 | End: 2021-05-30

## 2021-05-27 RX ADMIN — Medication 5 UNIT(S): at 16:51

## 2021-05-27 RX ADMIN — PANTOPRAZOLE SODIUM 40 MILLIGRAM(S): 20 TABLET, DELAYED RELEASE ORAL at 05:17

## 2021-05-27 RX ADMIN — AMLODIPINE BESYLATE 5 MILLIGRAM(S): 2.5 TABLET ORAL at 13:33

## 2021-05-27 RX ADMIN — Medication 137 MICROGRAM(S): at 05:16

## 2021-05-27 RX ADMIN — INSULIN GLARGINE 15 UNIT(S): 100 INJECTION, SOLUTION SUBCUTANEOUS at 21:38

## 2021-05-27 RX ADMIN — CLOPIDOGREL BISULFATE 75 MILLIGRAM(S): 75 TABLET, FILM COATED ORAL at 11:19

## 2021-05-27 RX ADMIN — HEPARIN SODIUM 5000 UNIT(S): 5000 INJECTION INTRAVENOUS; SUBCUTANEOUS at 17:04

## 2021-05-27 RX ADMIN — HEPARIN SODIUM 5000 UNIT(S): 5000 INJECTION INTRAVENOUS; SUBCUTANEOUS at 05:17

## 2021-05-27 RX ADMIN — ATORVASTATIN CALCIUM 80 MILLIGRAM(S): 80 TABLET, FILM COATED ORAL at 21:38

## 2021-05-27 RX ADMIN — Medication 5 UNIT(S): at 11:44

## 2021-05-27 RX ADMIN — Medication 5 UNIT(S): at 08:17

## 2021-05-27 RX ADMIN — Medication 81 MILLIGRAM(S): at 11:19

## 2021-05-27 RX ADMIN — Medication 1: at 08:18

## 2021-05-27 NOTE — OCCUPATIONAL THERAPY INITIAL EVALUATION ADULT - GENERAL OBSERVATIONS, REHAB EVAL
Pt received seated in b/s chair in NAD, +tele, +BP cuff, +pulse oxi, agreeable to OT evaluation, left seated in b/s chair all lines intact

## 2021-05-27 NOTE — PROGRESS NOTE ADULT - SUBJECTIVE AND OBJECTIVE BOX
JOE FORREST 66y Female  MRN#: [id number]   Hospital Day: 3d    SUBJECTIVE  Patient is a 66y old Female who presents with a chief complaint of Facial droop (27 May 2021 10:24)  Currently admitted to medicine with the primary diagnosis of CVA (cerebral vascular accident)      INTERVAL HPI AND OVERNIGHT EVENTS:  Patient was examined and seen at bedside. This morning she is resting comfortably in bed and reports no issues or overnight events.    REVIEW OF SYMPTOMS:  CONSTITUTIONAL: No weakness, fevers or chills; No headaches  EYES: No visual changes, eye pain, or discharge  ENT: No vertigo; No ear pain or change in hearing; No sore throat or difficulty swallowing  NECK: No pain or stiffness  RESPIRATORY: No cough, wheezing, or hemoptysis; No shortness of breath  CARDIOVASCULAR: No chest pain or palpitations  GASTROINTESTINAL: No abdominal or epigastric pain; No nausea, vomiting, or hematemesis; No diarrhea or constipation; No melena or hematochezia  GENITOURINARY: No dysuria, frequency or hematuria  MUSCULOSKELETAL: No joint pain, no muscle pain, no weakness  NEUROLOGICAL: No numbness or weakness  SKIN: No itching or rashes    OBJECTIVE  PAST MEDICAL & SURGICAL HISTORY  Diabetes    Hypertension      ALLERGIES:  No Known Allergies    MEDICATIONS:  STANDING MEDICATIONS  amLODIPine   Tablet 5 milliGRAM(s) Oral daily  aspirin  chewable 81 milliGRAM(s) Oral daily  atorvastatin 80 milliGRAM(s) Oral at bedtime  clopidogrel Tablet 75 milliGRAM(s) Oral daily  dextrose 40% Gel 15 Gram(s) Oral once  dextrose 5%. 1000 milliLiter(s) IV Continuous <Continuous>  dextrose 5%. 1000 milliLiter(s) IV Continuous <Continuous>  dextrose 50% Injectable 25 Gram(s) IV Push once  dextrose 50% Injectable 12.5 Gram(s) IV Push once  dextrose 50% Injectable 25 Gram(s) IV Push once  glucagon  Injectable 1 milliGRAM(s) IntraMuscular once  heparin   Injectable 5000 Unit(s) SubCutaneous every 12 hours  insulin glargine Injectable (LANTUS) 15 Unit(s) SubCutaneous at bedtime  insulin lispro (ADMELOG) corrective regimen sliding scale   SubCutaneous three times a day before meals  insulin lispro Injectable (ADMELOG) 5 Unit(s) SubCutaneous three times a day before meals  levothyroxine 137 MICROGram(s) Oral daily  pantoprazole    Tablet 40 milliGRAM(s) Oral before breakfast    PRN MEDICATIONS      VITAL SIGNS: Last 24 Hours  T(C): 36.6 (27 May 2021 05:45), Max: 36.7 (27 May 2021 00:20)  T(F): 97.9 (27 May 2021 05:45), Max: 98 (27 May 2021 00:20)  HR: 80 (27 May 2021 12:30) (60 - 80)  BP: 119/87 (27 May 2021 12:30) (119/87 - 179/96)  BP(mean): 105 (27 May 2021 12:30) (105 - 105)  RR: 18 (27 May 2021 12:30) (18 - 18)  SpO2: 98% (27 May 2021 12:30) (97% - 99%)    LABS:                        11.8   3.96  )-----------( 166      ( 27 May 2021 05:29 )             37.2     05-27    141  |  102  |  15  ----------------------------<  160<H>  3.5   |  27  |  0.8    Ca    9.1      27 May 2021 05:29  Mg     1.8     05-27    TPro  7.1  /  Alb  4.3  /  TBili  0.5  /  DBili  x   /  AST  13  /  ALT  8   /  AlkPhos  66  05-26                  RADIOLOGY:      PHYSICAL EXAM:  CONSTITUTIONAL: No acute distress, well-developed, well-groomed, AAOx3  HEAD: Atraumatic, normocephalic  EYES: EOM intact, PERRLA, conjunctiva and sclera clear  ENT: Supple, no masses, no thyromegaly, no bruits, no JVD; moist mucous membranes  PULMONARY: Clear to auscultation bilaterally; no wheezes, rales, or rhonchi  CARDIOVASCULAR: Regular rate and rhythm; no murmurs, rubs, or gallops  GASTROINTESTINAL: Soft, non-tender, non-distended; bowel sounds present  MUSCULOSKELETAL: 2+ peripheral pulses; no clubbing, no cyanosis, no edema  NEUROLOGY: non-focal  SKIN: No rashes or lesions; warm and dry

## 2021-05-27 NOTE — PROGRESS NOTE ADULT - ASSESSMENT
66 F pmh DM, HTN, DLD, total thyroidectomy, hypothyroidism, unspecified heart murmur on baby ASA p/w CVA-like symptoms. Hypertensive in ED to 220s/120s.   CTH showing more prominent microvascular changes since prior exam (1/16/19) but no evidence of territorial infarct. CTA H/N showing focal severe stenosis of proximal L intracranial vertebral artery, moderate stenosis of mild basilar artery, no aneurysm/dissection.     #L hemifacial droop and R sided focal deficits 2/2 acute CVA   - Neuro on board   - CTH: no territorial infarct but more prominent microvascular changes compared to prior exam on 5/24/19  - CTA H/N: Focal severe stenosis of proximal L intracranial vertebral artery, mod-sev stenosis of mid basilar artery  - MRI acute ischemic strokes in L CR and R occipital stroke   - Hx of heart murmur (unspecified)  - Not on blood thinners at home, only on ASA qd  - C/w ASA 81mg, atorvastatin 80 mg, and plavix 75mg qd  - TTE w/ bubble pending  - Neurochecks q4h  - Troponin negative x 2  - A1c 8.2  - lipid panel , ,   - PT/OT/physiatry eval  - EP consulted for loop placement    #HTN   - c/w amlodipine 5 mg     #DM  - c/w lantus 21 units and lispro 7 unit    #DLD  - c/w statin 80 mg     #Hypothyroidism  #Hx of complete thyroidectomy  #Hyperenhancing soft tissue structures visualized in the anterior soft tissues of the neck and base of tongue.   - CT angio of neck: Hyperenhancing soft tissue structures visualized in the anterior soft tissues of the neck and base of tongue. Findings may reflect enhancing glandular tissue, ectopic thyroid or mass; this may be confirmed with nuclear medicine thyroid scan  - Holding  on thyroid scan. Spoke to radiology and patient needs 4 weeks of no iodine contrast in order to get scan. Used contrast yesterday. f/u OP   - C/w home levothyroxine    FULL CODE  Activity: IAT  Diet: DASH/TLC  DVT ppx: Heparin sq BID  GI ppx: Protonix  Dispo: Acute

## 2021-05-27 NOTE — OCCUPATIONAL THERAPY INITIAL EVALUATION ADULT - TRANSFER TRAINING, PT EVAL
Patient will perform bed<>chair transfer independently with use of appropriate assistive device by discharge.

## 2021-05-27 NOTE — CONSULT NOTE ADULT - SUBJECTIVE AND OBJECTIVE BOX
Patient is a 66y old  Female who presents with a chief complaint of Facial droop (26 May 2021 14:39)    HPI:  66 F pmh DM, HTN, DLD, total thyroidectomy, hypothyroidism, unspecified heart murmur on baby ASA p/w CVA-like symptoms x 3d. 3 days ago patient states she had difficulty writing (R-handed) and her children told her her face "looked strange" and that she was speaking slowly. Additionally she was having R foot weakness described as having to "more her leg more". She made PCP appt for 5/24 and she was told to come to the ED. In the ED noted to have R hand numbness & R foot weakness. Pt denies any falls or head trauma. No ha, vision changes, neck pain, cp/sob, nv, abd pain. Hypertensive in ED to 220s/120s.   CTH showing more prominent microvascular changes since prior exam (1/16/19) but no evidence of territorial infarct. CTA H/N showing focal severe stenosis of proximal L intracranial vertebral artery, moderate stenosis of mild basilar artery, no aneurysm/dissection.    (24 May 2021 22:15)      EP consulted for loop recorder implant.     REVIEW OF SYSTEMS    [ ] A ten-point review of systems was otherwise negative except as noted.  [ ] Due to altered mental status/intubation, subjective information were not able to be obtained from the patient. History was obtained, to the extent possible, from review of the chart and collateral sources of information.      PAST MEDICAL & SURGICAL HISTORY:  Diabetes    Hypertension        Home Medications:  amLODIPine 5 mg oral tablet: 1 tab(s) orally once a day (25 May 2021 04:39)  aspirin 81 mg oral tablet: 1 tab(s) orally once a day (25 May 2021 04:42)  Crestor 5 mg oral tablet: 1 tab(s) orally once a day (25 May 2021 04:40)  folic acid 1 mg oral tablet: 1 tab(s) orally once a day (25 May 2021 04:40)  levothyroxine 137 mcg (0.137 mg) oral tablet: 1 tab(s) orally once a day (25 May 2021 04:41)  losartan-hydrochlorothiazide 100 mg-25 mg oral tablet: 1 tab(s) orally once a day (25 May 2021 04:41)  metFORMIN 1000 mg oral tablet: 1 tab(s) orally 2 times a day (25 May 2021 04:40)  Mobic 15 mg oral tablet: 1 tab(s) orally once a day, As Needed (25 May 2021 04:42)      Allergies:    No Known Allergies    FAMILY HISTORY:      SOCIAL HISTORY:  CIGARETTES:  ALCOHOL:      MEDICATIONS  (STANDING):  aspirin  chewable 81 milliGRAM(s) Oral daily  atorvastatin 80 milliGRAM(s) Oral at bedtime  clopidogrel Tablet 75 milliGRAM(s) Oral daily  dextrose 40% Gel 15 Gram(s) Oral once  dextrose 5%. 1000 milliLiter(s) (50 mL/Hr) IV Continuous <Continuous>  dextrose 5%. 1000 milliLiter(s) (100 mL/Hr) IV Continuous <Continuous>  dextrose 50% Injectable 25 Gram(s) IV Push once  dextrose 50% Injectable 12.5 Gram(s) IV Push once  dextrose 50% Injectable 25 Gram(s) IV Push once  glucagon  Injectable 1 milliGRAM(s) IntraMuscular once  heparin   Injectable 5000 Unit(s) SubCutaneous every 12 hours  insulin glargine Injectable (LANTUS) 15 Unit(s) SubCutaneous at bedtime  insulin lispro (ADMELOG) corrective regimen sliding scale   SubCutaneous three times a day before meals  insulin lispro Injectable (ADMELOG) 5 Unit(s) SubCutaneous three times a day before meals  levothyroxine 137 MICROGram(s) Oral daily  pantoprazole    Tablet 40 milliGRAM(s) Oral before breakfast    MEDICATIONS  (PRN):      Vital Signs Last 24 Hrs  T(C): 36.6 (27 May 2021 05:45), Max: 36.7 (27 May 2021 00:20)  T(F): 97.9 (27 May 2021 05:45), Max: 98 (27 May 2021 00:20)  HR: 78 (27 May 2021 07:40) (60 - 86)  BP: 177/92 (27 May 2021 07:40) (157/89 - 179/96)  BP(mean): --  RR: 18 (27 May 2021 07:40) (16 - 18)  SpO2: 98% (27 May 2021 07:40) (97% - 99%)    PHYSICAL EXAM:    GENERAL: In no apparent distress, well nourished, and hydrated.  HEAD:  Atraumatic, Normocephalic  EYES: EOMI, PERRLA, conjunctiva and sclera clear  NECK: Supple and normal thyroid.  No JVD or carotid bruit.  Carotid pulse is 2+ bilaterally.  HEART: Regular rate and rhythm; No murmurs, rubs, or gallops.  PULMONARY: Clear to auscultation and perfusion.  No rales, wheezing, or rhonchi bilaterally.  ABDOMEN: Soft, Nontender, Nondistended; Bowel sounds present  EXTREMITIES:  2+ Peripheral Pulses, No clubbing, cyanosis, or edema  NEUROLOGICAL: Grossly nonfocal      INTERPRETATION OF TELEMETRY:     ECG: < from: 12 Lead ECG (05.24.21 @ 17:46) >  Ventricular Rate 69 BPM    Atrial Rate 69 BPM    P-R Interval 200 ms    QRS Duration 106 ms    Q-T Interval 424 ms    QTC Calculation(Bazett) 454 ms    P Axis 79 degrees    R Axis 259 degrees    T Axis 84 degrees    Diagnosis Line Normal sinus rhythm  Right superior axis deviation  Incomplete right bundle branch block  Possible Anterior infarct , age undetermined  Abnormal ECG    < end of copied text >      I&O's Detail    26 May 2021 07:01  -  27 May 2021 07:00  --------------------------------------------------------  IN:    Oral Fluid: 240 mL  Total IN: 240 mL    OUT:  Total OUT: 0 mL    Total NET: 240 mL    LABS:                        11.8   3.96  )-----------( 166      ( 27 May 2021 05:29 )             37.2     05-27    141  |  102  |  15  ----------------------------<  160<H>  3.5   |  27  |  0.8    Ca    9.1      27 May 2021 05:29  Mg     1.8     05-27    TPro  7.1  /  Alb  4.3  /  TBili  0.5  /  DBili  x   /  AST  13  /  ALT  8   /  AlkPhos  66  05-26    CARDIAC MARKERS ( 25 May 2021 11:24 )  x     / <0.01 ng/mL / x     / x     / x          PTT - ( 25 May 2021 11:24 )  PTT:36.0 sec    BNP      I&O's Detail    26 May 2021 07:01  -  27 May 2021 07:00  --------------------------------------------------------  IN:    Oral Fluid: 240 mL  Total IN: 240 mL    OUT:  Total OUT: 0 mL    Total NET: 240 mL    RADIOLOGY & ADDITIONAL STUDIES:  < from: MR Head No Cont (05.25.21 @ 21:32) >  FINDINGS:  There are focal acute infarcts involving the left corona radiata as well as the right occipital lobe. There is no evidence of hemorrhagic transformation.    There is periventricular, scattered subcortical white matter as well as pontine T2 and FLAIR signal hyperintensity.    There is cortical volume loss. Ventricles are unremarkable.    There is no extra-axial fluid collection.    Major intracranial flow-voids are preserved.    The visualized orbits are unremarkable.    The sellar and suprasellar structures, and craniocervical junction are unremarkable.    The calvarium signal intensity is within normal limits.    There is sclerosis of the bilateral mastoid tips. Paranasal sinuses are unremarkable.    IMPRESSION:  Focal acute infarcts involving the left corona radiata and right occipital lobe without evidence of hemorrhagic transformation.    Moderate chronic microvascular changes.    < end of copied text >    < from: CT Angio Head w/ IV Cont (05.24.21 @ 22:08) >  FINDINGS:    CTA neck:  The aortic arch, origin of the great vessels and subclavian arteries are unremarkable. The bilateral common carotid arteries are unremarkable.    Minimal atherosclerotic calcifications along the bilateral carotid bifurcations without significant stenosis. 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 There is a retropharyngeal, medial course of the left cervical internal carotid artery.    The bilateral cervical vertebral arteries are unremarkable.    CTA head:  The intracranial internal carotid arteries are patent without significant stenosis. Scattered atherosclerotic calcifications along the bilateral cavernous segments.    The middle cerebral arteries and anterior cerebral arteries are unremarkable without significant stenosis.    There is focal severe stenosis of the proximal intradural left vertebral artery V4 segment. The intracranial right vertebral artery is unremarkable.    There are moderate to severe stenosis of the mid basilar artery.    The superior cerebellar arteries and posterior cerebral arteries are without significant stenosis.    Degenerative changes of the spine. Nonvisualization of the thyroid. There is a hyperenhancing 1.7 x 1.8cm structure within the anterior portion of the neck.    Additional smaller 0.8 x 1.5 cm hyperdense structure in the base of tongue and in the left lateral tracheal space, measuring 0.6 x 1 cm.    IMPRESSION:  -Focal severe stenosis of the proximal left intracranial vertebral artery.  -Moderate to severe stenosis of the mid basilar artery.  -No CTA evidence of aneurysm or dissection.  -Hyperenhancing soft tissue structures visualized in the anterior soft tissues of the neck and base of tongue. Findings may reflect enhancing glandular tissue, ectopic thyroid or mass; this may be confirmed with nuclear medicine thyroid scan.    < end of copied text >   Patient is a 66y old  Female who presents with a chief complaint of Facial droop (26 May 2021 14:39)    HPI:  66 F pmh DM, HTN, DLD, total thyroidectomy, hypothyroidism, unspecified heart murmur on baby ASA p/w CVA-like symptoms x 3d. 3 days ago patient states she had difficulty writing (R-handed) and her children told her her face "looked strange" and that she was speaking slowly. Additionally she was having R foot weakness described as having to "more her leg more". She made PCP appt for 5/24 and she was told to come to the ED. In the ED noted to have R hand numbness & R foot weakness. Pt denies any falls or head trauma. No ha, vision changes, neck pain, cp/sob, nv, abd pain. Hypertensive in ED to 220s/120s.   CTH showing more prominent microvascular changes since prior exam (1/16/19) but no evidence of territorial infarct. CTA H/N showing focal severe stenosis of proximal L intracranial vertebral artery, moderate stenosis of mild basilar artery, no aneurysm/dissection.    (24 May 2021 22:15)      EP consulted for loop recorder implant.     REVIEW OF SYSTEMS    [x] A ten-point review of systems was otherwise negative except as noted.  [ ] Due to altered mental status/intubation, subjective information were not able to be obtained from the patient. History was obtained, to the extent possible, from review of the chart and collateral sources of information.      PAST MEDICAL & SURGICAL HISTORY:  Diabetes    Hypertension    Home Medications:  amLODIPine 5 mg oral tablet: 1 tab(s) orally once a day (25 May 2021 04:39)  aspirin 81 mg oral tablet: 1 tab(s) orally once a day (25 May 2021 04:42)  Crestor 5 mg oral tablet: 1 tab(s) orally once a day (25 May 2021 04:40)  folic acid 1 mg oral tablet: 1 tab(s) orally once a day (25 May 2021 04:40)  levothyroxine 137 mcg (0.137 mg) oral tablet: 1 tab(s) orally once a day (25 May 2021 04:41)  losartan-hydrochlorothiazide 100 mg-25 mg oral tablet: 1 tab(s) orally once a day (25 May 2021 04:41)  metFORMIN 1000 mg oral tablet: 1 tab(s) orally 2 times a day (25 May 2021 04:40)  Mobic 15 mg oral tablet: 1 tab(s) orally once a day, As Needed (25 May 2021 04:42)      Allergies:    No Known Allergies    FAMILY HISTORY:      SOCIAL HISTORY:  CIGARETTES: denies  ALCOHOL: denies      MEDICATIONS  (STANDING):  aspirin  chewable 81 milliGRAM(s) Oral daily  atorvastatin 80 milliGRAM(s) Oral at bedtime  clopidogrel Tablet 75 milliGRAM(s) Oral daily  dextrose 40% Gel 15 Gram(s) Oral once  dextrose 5%. 1000 milliLiter(s) (50 mL/Hr) IV Continuous <Continuous>  dextrose 5%. 1000 milliLiter(s) (100 mL/Hr) IV Continuous <Continuous>  dextrose 50% Injectable 25 Gram(s) IV Push once  dextrose 50% Injectable 12.5 Gram(s) IV Push once  dextrose 50% Injectable 25 Gram(s) IV Push once  glucagon  Injectable 1 milliGRAM(s) IntraMuscular once  heparin   Injectable 5000 Unit(s) SubCutaneous every 12 hours  insulin glargine Injectable (LANTUS) 15 Unit(s) SubCutaneous at bedtime  insulin lispro (ADMELOG) corrective regimen sliding scale   SubCutaneous three times a day before meals  insulin lispro Injectable (ADMELOG) 5 Unit(s) SubCutaneous three times a day before meals  levothyroxine 137 MICROGram(s) Oral daily  pantoprazole    Tablet 40 milliGRAM(s) Oral before breakfast    MEDICATIONS  (PRN):      Vital Signs Last 24 Hrs  T(C): 36.6 (27 May 2021 05:45), Max: 36.7 (27 May 2021 00:20)  T(F): 97.9 (27 May 2021 05:45), Max: 98 (27 May 2021 00:20)  HR: 78 (27 May 2021 07:40) (60 - 86)  BP: 177/92 (27 May 2021 07:40) (157/89 - 179/96)  BP(mean): --  RR: 18 (27 May 2021 07:40) (16 - 18)  SpO2: 98% (27 May 2021 07:40) (97% - 99%)    PHYSICAL EXAM:    GENERAL: In no apparent distress, well nourished, and hydrated.  HEART: Regular rate and rhythm; No murmurs, rubs, or gallops.  PULMONARY: Clear to auscultation and perfusion.  No rales, wheezing, or rhonchi bilaterally.  ABDOMEN: Soft, Nontender, Nondistended; Bowel sounds present  EXTREMITIES:  2+ Peripheral Pulses, No clubbing, cyanosis, or edema  NEUROLOGICAL: AO x4, dysarthria, right side weakness      INTERPRETATION OF TELEMETRY: SR 78 BPM, isaiah to 40s during sleep    ECG: < from: 12 Lead ECG (05.24.21 @ 17:46) >  Ventricular Rate 69 BPM    Atrial Rate 69 BPM    P-R Interval 200 ms    QRS Duration 106 ms    Q-T Interval 424 ms    QTC Calculation(Bazett) 454 ms    P Axis 79 degrees    R Axis 259 degrees    T Axis 84 degrees    Diagnosis Line Normal sinus rhythm  Right superior axis deviation  Incomplete right bundle branch block  Possible Anterior infarct , age undetermined  Abnormal ECG    < end of copied text >      I&O's Detail    26 May 2021 07:01  -  27 May 2021 07:00  --------------------------------------------------------  IN:    Oral Fluid: 240 mL  Total IN: 240 mL    OUT:  Total OUT: 0 mL    Total NET: 240 mL    LABS:                        11.8   3.96  )-----------( 166      ( 27 May 2021 05:29 )             37.2     05-27    141  |  102  |  15  ----------------------------<  160<H>  3.5   |  27  |  0.8    Ca    9.1      27 May 2021 05:29  Mg     1.8     05-27    TPro  7.1  /  Alb  4.3  /  TBili  0.5  /  DBili  x   /  AST  13  /  ALT  8   /  AlkPhos  66  05-26    CARDIAC MARKERS ( 25 May 2021 11:24 )  x     / <0.01 ng/mL / x     / x     / x          PTT - ( 25 May 2021 11:24 )  PTT:36.0 sec    BNP      I&O's Detail    26 May 2021 07:01  -  27 May 2021 07:00  --------------------------------------------------------  IN:    Oral Fluid: 240 mL  Total IN: 240 mL    OUT:  Total OUT: 0 mL    Total NET: 240 mL    RADIOLOGY & ADDITIONAL STUDIES:  < from: MR Head No Cont (05.25.21 @ 21:32) >  FINDINGS:  There are focal acute infarcts involving the left corona radiata as well as the right occipital lobe. There is no evidence of hemorrhagic transformation.    There is periventricular, scattered subcortical white matter as well as pontine T2 and FLAIR signal hyperintensity.    There is cortical volume loss. Ventricles are unremarkable.    There is no extra-axial fluid collection.    Major intracranial flow-voids are preserved.    The visualized orbits are unremarkable.    The sellar and suprasellar structures, and craniocervical junction are unremarkable.    The calvarium signal intensity is within normal limits.    There is sclerosis of the bilateral mastoid tips. Paranasal sinuses are unremarkable.    IMPRESSION:  Focal acute infarcts involving the left corona radiata and right occipital lobe without evidence of hemorrhagic transformation.    Moderate chronic microvascular changes.    < end of copied text >    < from: CT Angio Head w/ IV Cont (05.24.21 @ 22:08) >  FINDINGS:    CTA neck:  The aortic arch, origin of the great vessels and subclavian arteries are unremarkable. The bilateral common carotid arteries are unremarkable.    Minimal atherosclerotic calcifications along the bilateral carotid bifurcations without significant stenosis. 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 There is a retropharyngeal, medial course of the left cervical internal carotid artery.    The bilateral cervical vertebral arteries are unremarkable.    CTA head:  The intracranial internal carotid arteries are patent without significant stenosis. Scattered atherosclerotic calcifications along the bilateral cavernous segments.    The middle cerebral arteries and anterior cerebral arteries are unremarkable without significant stenosis.    There is focal severe stenosis of the proximal intradural left vertebral artery V4 segment. The intracranial right vertebral artery is unremarkable.    There are moderate to severe stenosis of the mid basilar artery.    The superior cerebellar arteries and posterior cerebral arteries are without significant stenosis.    Degenerative changes of the spine. Nonvisualization of the thyroid. There is a hyperenhancing 1.7 x 1.8cm structure within the anterior portion of the neck.    Additional smaller 0.8 x 1.5 cm hyperdense structure in the base of tongue and in the left lateral tracheal space, measuring 0.6 x 1 cm.    IMPRESSION:  -Focal severe stenosis of the proximal left intracranial vertebral artery.  -Moderate to severe stenosis of the mid basilar artery.  -No CTA evidence of aneurysm or dissection.  -Hyperenhancing soft tissue structures visualized in the anterior soft tissues of the neck and base of tongue. Findings may reflect enhancing glandular tissue, ectopic thyroid or mass; this may be confirmed with nuclear medicine thyroid scan.    < end of copied text >

## 2021-05-27 NOTE — CONSULT NOTE ADULT - ASSESSMENT
66F with Hx of DM, HTN, HLD, on ASA 81 presents to ED for R sided weakness and facial droop noted by PCP today when she went for a follow up appointment. PCP sent her into ED for evaluation for new stroke. MRI brain with acute ischemic strokes in L CR and R occipital stroke s/o cardioembolic etiology. 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    Impression:  Acute CVA   Cardiologist: NOne    66F with Hx of DM, HTN, HLD, on ASA 81 presents to ED for R sided weakness and facial droop noted by PCP today when she went for a follow up appointment. PCP sent her into ED for evaluation for new stroke. MRI brain with acute ischemic strokes in L CR and R occipital stroke s/o cardioembolic etiology. 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    Impression:  Acute CVA  Hx HTN, HLD  Hx DM    Plan:  - Recommend implantable loop recorder given suspicion of cardioembolic etiology  - Can plan for tomorrow, does not need to be NPO  - Hold DVT ppx in AM  - Cont tele    Plan discussed with patient  Spoke with pts friend Rev Dalton as per pt request [phone]

## 2021-05-27 NOTE — PROGRESS NOTE ADULT - SUBJECTIVE AND OBJECTIVE BOX
Patient is a 66y old  Female who presents with a chief complaint of Facial droop (27 May 2021 13:07)      HPI:  66 F pmh DM, HTN, DLD, total thyroidectomy, hypothyroidism, unspecified heart murmur on baby ASA p/w CVA-like symptoms x 3d. 3 days ago patient states she had difficulty writing (R-handed) and her children told her her face "looked strange" and that she was speaking slowly. Additionally she was having R foot weakness described as having to "more her leg more". She made PCP appt for 5/24 and she was told to come to the ED. In the ED noted to have R hand numbness & R foot weakness. Pt denies any falls or head trauma. No ha, vision changes, neck pain, cp/sob, nv, abd pain. Hypertensive in ED to 220s/120s.   CTH showing more prominent microvascular changes since prior exam (1/16/19) but no evidence of territorial infarct. CTA H/N showing focal severe stenosis of proximal L intracranial vertebral artery, moderate stenosis of mild basilar artery, no aneurysm/dissection. MRI of brain showed acute josh hemisphere infarcts             PHYSICAL EXAM    Vital Signs Last 24 Hrs  T(C): 36.6 (27 May 2021 13:45), Max: 36.7 (27 May 2021 00:20)  T(F): 97.9 (27 May 2021 13:45), Max: 98 (27 May 2021 00:20)  HR: 80 (27 May 2021 13:45) (60 - 80)  BP: 150/95 (27 May 2021 13:45) (119/87 - 179/96)  BP(mean): 105 (27 May 2021 12:30) (105 - 105)  RR: 18 (27 May 2021 13:45) (18 - 18)  SpO2: 98% (27 May 2021 13:45) (97% - 99%)    Constitutional - NAD  Chest - CTA  Cardiovascular - S1S2+  Abdomen -  Soft  Extremities -  No calf tenderness   Function : bed mobility / transfer independent / ADL CG                 ambulate 200' independent w/o AD      amLODIPine   Tablet 5 milliGRAM(s) Oral daily  aspirin  chewable 81 milliGRAM(s) Oral daily  atorvastatin 80 milliGRAM(s) Oral at bedtime  clopidogrel Tablet 75 milliGRAM(s) Oral daily  dextrose 40% Gel 15 Gram(s) Oral once  dextrose 5%. 1000 milliLiter(s) IV Continuous <Continuous>  dextrose 5%. 1000 milliLiter(s) IV Continuous <Continuous>  dextrose 50% Injectable 25 Gram(s) IV Push once  dextrose 50% Injectable 12.5 Gram(s) IV Push once  dextrose 50% Injectable 25 Gram(s) IV Push once  glucagon  Injectable 1 milliGRAM(s) IntraMuscular once  heparin   Injectable 5000 Unit(s) SubCutaneous every 12 hours  insulin glargine Injectable (LANTUS) 15 Unit(s) SubCutaneous at bedtime  insulin lispro (ADMELOG) corrective regimen sliding scale   SubCutaneous three times a day before meals  insulin lispro Injectable (ADMELOG) 5 Unit(s) SubCutaneous three times a day before meals  levothyroxine 137 MICROGram(s) Oral daily  pantoprazole    Tablet 40 milliGRAM(s) Oral before breakfast      RECENT LABS/IMAGING                        11.8   3.96  )-----------( 166      ( 27 May 2021 05:29 )             37.2     05-27    141  |  102  |  15  ----------------------------<  160<H>  3.5   |  27  |  0.8    Ca    9.1      27 May 2021 05:29  Mg     1.8     05-27    TPro  7.1  /  Alb  4.3  /  TBili  0.5  /  DBili  x   /  AST  13  /  ALT  8   /  AlkPhos  66  05-26

## 2021-05-27 NOTE — PROGRESS NOTE ADULT - ASSESSMENT
Imp :  cva / right hemiparesis / high functioning, awaiting for possible loop recorder placement  Plan : continue bedside therapy           d/c home with outpatient OT

## 2021-05-28 LAB
GLUCOSE BLDC GLUCOMTR-MCNC: 137 MG/DL — HIGH (ref 70–99)
GLUCOSE BLDC GLUCOMTR-MCNC: 147 MG/DL — HIGH (ref 70–99)
GLUCOSE BLDC GLUCOMTR-MCNC: 150 MG/DL — HIGH (ref 70–99)
GLUCOSE BLDC GLUCOMTR-MCNC: 158 MG/DL — HIGH (ref 70–99)

## 2021-05-28 PROCEDURE — 99233 SBSQ HOSP IP/OBS HIGH 50: CPT

## 2021-05-28 PROCEDURE — 93306 TTE W/DOPPLER COMPLETE: CPT | Mod: 26

## 2021-05-28 PROCEDURE — 33285 INSJ SUBQ CAR RHYTHM MNTR: CPT

## 2021-05-28 RX ORDER — CEPHALEXIN 500 MG
500 CAPSULE ORAL ONCE
Refills: 0 | Status: COMPLETED | OUTPATIENT
Start: 2021-05-28 | End: 2021-05-28

## 2021-05-28 RX ORDER — LACTULOSE 10 G/15ML
10 SOLUTION ORAL
Refills: 0 | Status: DISCONTINUED | OUTPATIENT
Start: 2021-05-28 | End: 2021-06-01

## 2021-05-28 RX ADMIN — LACTULOSE 10 GRAM(S): 10 SOLUTION ORAL at 12:13

## 2021-05-28 RX ADMIN — Medication 81 MILLIGRAM(S): at 11:31

## 2021-05-28 RX ADMIN — Medication 5 UNIT(S): at 11:30

## 2021-05-28 RX ADMIN — LACTULOSE 10 GRAM(S): 10 SOLUTION ORAL at 21:28

## 2021-05-28 RX ADMIN — Medication 137 MICROGRAM(S): at 05:26

## 2021-05-28 RX ADMIN — PANTOPRAZOLE SODIUM 40 MILLIGRAM(S): 20 TABLET, DELAYED RELEASE ORAL at 05:27

## 2021-05-28 RX ADMIN — INSULIN GLARGINE 15 UNIT(S): 100 INJECTION, SOLUTION SUBCUTANEOUS at 21:29

## 2021-05-28 RX ADMIN — Medication 5 UNIT(S): at 07:48

## 2021-05-28 RX ADMIN — CLOPIDOGREL BISULFATE 75 MILLIGRAM(S): 75 TABLET, FILM COATED ORAL at 11:31

## 2021-05-28 RX ADMIN — Medication 5 UNIT(S): at 16:37

## 2021-05-28 RX ADMIN — ATORVASTATIN CALCIUM 80 MILLIGRAM(S): 80 TABLET, FILM COATED ORAL at 21:28

## 2021-05-28 RX ADMIN — AMLODIPINE BESYLATE 5 MILLIGRAM(S): 2.5 TABLET ORAL at 05:27

## 2021-05-28 RX ADMIN — HEPARIN SODIUM 5000 UNIT(S): 5000 INJECTION INTRAVENOUS; SUBCUTANEOUS at 17:49

## 2021-05-28 NOTE — PROGRESS NOTE ADULT - SUBJECTIVE AND OBJECTIVE BOX
Hospital Day:  4d    Patient is a 66y old  Female who presents with a chief complaint of 4d    Subjective:  No overnight events. Seen and examined at bedside. Patient reported no acute complaints. Spoke with neurology to obtain clear direction for remaining testing. TTE with bubble today as well as LOOP with EPS. Patient will need a RONALD. Cardiology consulted for it; tentatively scheduled for a RONALD on Tuesday. COVID swab order in place for sunday afternoon to ensure negative result in system for the RONALD.     Family contact: Spoke to Reverend Valencia who is her . Full update was provided.     Past Medical Hx:   Diabetes  Hypertension    Past Sx:    Allergies:  No Known Allergies    Current Meds:   Standng Meds:  amLODIPine   Tablet 5 milliGRAM(s) Oral daily  aspirin  chewable 81 milliGRAM(s) Oral daily  atorvastatin 80 milliGRAM(s) Oral at bedtime  clopidogrel Tablet 75 milliGRAM(s) Oral daily  dextrose 40% Gel 15 Gram(s) Oral once  dextrose 5%. 1000 milliLiter(s) (50 mL/Hr) IV Continuous <Continuous>  dextrose 5%. 1000 milliLiter(s) (100 mL/Hr) IV Continuous <Continuous>  dextrose 50% Injectable 25 Gram(s) IV Push once  dextrose 50% Injectable 12.5 Gram(s) IV Push once  dextrose 50% Injectable 25 Gram(s) IV Push once  glucagon  Injectable 1 milliGRAM(s) IntraMuscular once  heparin   Injectable 5000 Unit(s) SubCutaneous every 12 hours  insulin glargine Injectable (LANTUS) 15 Unit(s) SubCutaneous at bedtime  insulin lispro (ADMELOG) corrective regimen sliding scale   SubCutaneous three times a day before meals  insulin lispro Injectable (ADMELOG) 5 Unit(s) SubCutaneous three times a day before meals  levothyroxine 137 MICROGram(s) Oral daily  pantoprazole    Tablet 40 milliGRAM(s) Oral before breakfast    PRN Meds:    HOME MEDICATIONS:  amLODIPine 5 mg oral tablet: 1 tab(s) orally once a day  aspirin 81 mg oral tablet: 1 tab(s) orally once a day  Crestor 5 mg oral tablet: 1 tab(s) orally once a day  folic acid 1 mg oral tablet: 1 tab(s) orally once a day  levothyroxine 137 mcg (0.137 mg) oral tablet: 1 tab(s) orally once a day  losartan-hydrochlorothiazide 100 mg-25 mg oral tablet: 1 tab(s) orally once a day  metFORMIN 1000 mg oral tablet: 1 tab(s) orally 2 times a day  Mobic 15 mg oral tablet: 1 tab(s) orally once a day, As Needed      Vital Signs:   T(F): 97.8 (05-28-21 @ 05:13), Max: 98.7 (05-27-21 @ 20:20)  HR: 104 (05-28-21 @ 05:13) (70 - 104)  BP: 184/87 (05-28-21 @ 05:13) (119/87 - 184/87)  RR: 18 (05-28-21 @ 05:13) (18 - 19)  SpO2: 98% (05-27-21 @ 13:45) (98% - 98%)        Physical Exam:   GENERAL: NAD, pleasant and conversive, appearing stated age  HEENT: NCAT, PERRLA, EOMI  CHEST/LUNG: Clear to auscultation bilaterally, no wheezing, rhonchi, rales   HEART: Regular rate and rhythm; s1 s2 appreciated, No murmurs, rubs, or gallops  ABDOMEN: Soft, Nontender, Nondistended; Bowel sounds present  EXTREMITIES: No LE edema b/l  SKIN: no rashes, no new lesions  NERVOUS SYSTEM:  Alert & Oriented X3, sensation equal and intact bilaterally,   LINES/CATHETERS: peripheral IV    Labs:                         11.8   3.96  )-----------( 166      ( 27 May 2021 05:29 )             37.2       27 May 2021 05:29    141    |  102    |  15     ----------------------------<  160    3.5     |  27     |  0.8      Ca    9.1        27 May 2021 05:29  Mg     1.8       27 May 2021 05:29    Troponin <0.01, CKMB --, CK -- 05-25-21 @ 11:24  Troponin <0.01, CKMB --, CK -- 05-24-21 @ 18:01    Radiology:   None Today    Assessment and Plan:   This is a 66 year old female pmh DM, HTN, DLD, total thyroidectomy, hypothyroidism, unspecified heart murmur on baby ASA p/w CVA-like symptoms. Hypertensive in ED to 220s/120s.   CTH showing more prominent microvascular changes since prior exam (1/16/19) but no evidence of territorial infarct. CTA H/N showing focal severe stenosis of proximal L intracranial vertebral artery, moderate stenosis of mild basilar artery, no aneurysm/dissection.     #L hemifacial droop and R sided focal deficits 2/2 acute CVA   - Neurology following  - CTH: no territorial infarct but more prominent microvascular changes compared to prior exam on 5/24/19  - CTA H/N: Focal severe stenosis of proximal L intracranial vertebral artery, mod-sev stenosis of mid basilar artery  - MRI acute ischemic strokes in L CR and R occipital stroke   - Noted heart murmur history  - Continue on ASA 81mg, atorvastatin 80 mg, and plavix 75mg qd  - Continue Neurochecks every 4 hours  - A1c 8.2  - lipid panel , ,   - PT/OT/physiatry eval  - TTE with bubble today; RONALD for Tuesday.   - Cardiology consulted for RONALD   - EPS for loop recorder today     #HTN   - continue on amlodipine 5 mg     #DM  - a1c 8.2   - Monitor finger sticks   - Continue on Basal/bolus insulin  - continue on Lantus 15 Lispro 5/5/5    #DLD  - Continue on atorvastatin 80 mg     #Hypothyroidism  #Hx of complete thyroidectomy  #Hyperenhancing soft tissue structures visualized in the anterior soft tissues of the neck and base of tongue.   - CT angio of neck: Hyperenhancing soft tissue structures visualized in the anterior soft tissues of the neck and base of tongue. Findings may reflect enhancing glandular tissue, ectopic thyroid or mass; this may be confirmed with nuclear medicine thyroid scan  - Thyroid scan as an outpatient due to iodine contrast load  - Continue on Levothyroxine     Activity: As tolerated  Diet: DASH/Carb  DVT ppx: Heparin   GI ppx: Protonix  Code Status: Full Code  Dispo: Acute

## 2021-05-28 NOTE — CHART NOTE - NSCHARTNOTEFT_GEN_A_CORE
Pt. is on add on schedule for RONALD on Monday 5/31/2021. Keep NPO past midnight Sunday night for RONALD on Monday. Please obtain COVID swab over the weekend. As per hospital protocol pt. needs negative COVID PCR swab within 72 hours prior to the procedure.

## 2021-05-28 NOTE — CHART NOTE - NSCHARTNOTEFT_GEN_A_CORE
EP PROCEDURE NOTE    Date of Procedure: 05-28-21  EP Attending: Dr. Mirza  Assistant: AVE Galindo  Referring Physician:   Procedure: Loop Recorder Implant    Indication: 66y Female with history of cryptogenic CVA referred for implantable loop recorder.     Anesthesia: Local    EQUIPMENT IMPLANTED  : Medtronic Linq  Model Number: [id number]  Serial Number: [id number]    PROCEDURE DESCRIPTION  The patient was brought to the electrophysiology procedure area in a non-sedated state and received preoperative antibiotics. Informed, written consent was obtained prior to the procedure. The left anterior chest region was prepped and cleaned from the nipple to the upper left clavicular border with serial applications of Chlorhexidine. Patient was then covered with sterile drapes in the usual manner. Blood pressure, oxygenation, and level of comfort were stable throughout.     Following infiltration with local anesthetic, a 1-cm incision was made along the left sternal border at the fourth intercostal space. Using the tunneling device the implantable loop recorder was inserted into the subcutaneous tissue. Direct pressure was applied to the wound to obtain hemostasis. The wound was approximated with Dermabond. Steri-strips and a dry, sterile dressing were placed over the wound. R waves were noted to be 0.17 mV.     The patient tolerated the procedure well.     COMPLICATIONS  No immediate complications    CONCLUSIONS  Successful loop recorder implant    EBL: 5 cc EP PROCEDURE NOTE    Date of Procedure: 05-28-21  EP Attending: Dr. Mirza  Assistant: AVE Galindo  Referring Physician: Dr. Walters  Procedure: Loop Recorder Implant    Indication: 66y Female with history of cryptogenic CVA referred for implantable loop recorder.     Anesthesia: Local    EQUIPMENT IMPLANTED  : Medtronic Linq  Model Number: [id number]  Serial Number: [id number]    PROCEDURE DESCRIPTION  The patient was brought to the electrophysiology procedure area in a non-sedated state and received preoperative antibiotics. Informed, written consent was obtained prior to the procedure. The left anterior chest region was prepped and cleaned from the nipple to the upper left clavicular border with serial applications of Chlorhexidine. Patient was then covered with sterile drapes in the usual manner. Blood pressure, oxygenation, and level of comfort were stable throughout.     Following infiltration with local anesthetic, a 1-cm incision was made along the left sternal border at the fourth intercostal space. Using the tunneling device the implantable loop recorder was inserted into the subcutaneous tissue. Direct pressure was applied to the wound to obtain hemostasis. The wound was approximated with Dermabond. Steri-strips and a dry, sterile dressing were placed over the wound. R waves were noted to be 0.17 mV.     The patient tolerated the procedure well.     COMPLICATIONS  No immediate complications    CONCLUSIONS  Successful loop recorder implant    EBL: 5 cc

## 2021-05-28 NOTE — PROGRESS NOTE ADULT - SUBJECTIVE AND OBJECTIVE BOX
Patient is a 66y old  Female who presents with a chief complaint of Facial droop (28 May 2021 14:54)    INTERVAL HPI/OVERNIGHT EVENTS: Patient was examined and seen at bedside. This morning pt is resting comfortably in bed and reports no new issues or overnight events. No complaints, feels better  ROS: Denies CP, SOB, AP, new weakness  All other systems reviewed and are within normal limits.  InitialHPI:  66 F pmh DM, HTN, DLD, total thyroidectomy, hypothyroidism, unspecified heart murmur on baby ASA p/w CVA-like symptoms x 3d. 3 days ago patient states she had difficulty writing (R-handed) and her children told her her face "looked strange" and that she was speaking slowly. Additionally she was having R foot weakness described as having to "more her leg more". She made PCP appt for 5/24 and she was told to come to the ED. In the ED noted to have R hand numbness & R foot weakness. Pt denies any falls or head trauma. No ha, vision changes, neck pain, cp/sob, nv, abd pain. Hypertensive in ED to 220s/120s.   CTH showing more prominent microvascular changes since prior exam (1/16/19) but no evidence of territorial infarct. CTA H/N showing focal severe stenosis of proximal L intracranial vertebral artery, moderate stenosis of mild basilar artery, no aneurysm/dissection.    (24 May 2021 22:15)    PAST MEDICAL & SURGICAL HISTORY:  Diabetes    Hypertension        General: NAD, AAO3  HEENT:  EOMI, no LAD  CV: S1 S2  Resp: decreased breath sounds at bases  GI: NT/ND/S +BS  MS: no clubbing/cyanosis/edema, + pulses b/l  Neuro: Rt 5-/5, +reflexes thruout    MEDICATIONS  (STANDING):  amLODIPine   Tablet 5 milliGRAM(s) Oral daily  aspirin  chewable 81 milliGRAM(s) Oral daily  atorvastatin 80 milliGRAM(s) Oral at bedtime  clopidogrel Tablet 75 milliGRAM(s) Oral daily  dextrose 40% Gel 15 Gram(s) Oral once  dextrose 5%. 1000 milliLiter(s) (50 mL/Hr) IV Continuous <Continuous>  dextrose 5%. 1000 milliLiter(s) (100 mL/Hr) IV Continuous <Continuous>  dextrose 50% Injectable 25 Gram(s) IV Push once  dextrose 50% Injectable 12.5 Gram(s) IV Push once  dextrose 50% Injectable 25 Gram(s) IV Push once  glucagon  Injectable 1 milliGRAM(s) IntraMuscular once  heparin   Injectable 5000 Unit(s) SubCutaneous every 12 hours  insulin glargine Injectable (LANTUS) 15 Unit(s) SubCutaneous at bedtime  insulin lispro (ADMELOG) corrective regimen sliding scale   SubCutaneous three times a day before meals  insulin lispro Injectable (ADMELOG) 5 Unit(s) SubCutaneous three times a day before meals  levothyroxine 137 MICROGram(s) Oral daily  pantoprazole    Tablet 40 milliGRAM(s) Oral before breakfast    MEDICATIONS  (PRN):  lactulose Syrup 10 Gram(s) Oral two times a day PRN constipation    Vital Signs Last 24 Hrs  T(C): 36.7 (28 May 2021 13:13), Max: 37.1 (27 May 2021 20:20)  T(F): 98.1 (28 May 2021 13:13), Max: 98.7 (27 May 2021 20:20)  HR: 86 (28 May 2021 13:13) (70 - 104)  BP: 113/77 (28 May 2021 13:13) (113/77 - 184/87)  BP(mean): 125 (28 May 2021 05:13) (122 - 125)  RR: 19 (28 May 2021 13:13) (18 - 19)  SpO2: --  CAPILLARY BLOOD GLUCOSE      POCT Blood Glucose.: 137 mg/dL (28 May 2021 16:09)  POCT Blood Glucose.: 150 mg/dL (28 May 2021 11:25)  POCT Blood Glucose.: 147 mg/dL (28 May 2021 07:12)  POCT Blood Glucose.: 152 mg/dL (27 May 2021 21:34)                          11.8   3.96  )-----------( 166      ( 27 May 2021 05:29 )             37.2     05-27    141  |  102  |  15  ----------------------------<  160<H>  3.5   |  27  |  0.8    Ca    9.1      27 May 2021 05:29  Mg     1.8     05-27                        Chart, Consultant(s) Notes Reviewed:  [x ] YES  [ ] NO  Care Discussed with Consultants/Other Providers/ Housestaff [ x] YES  [ ] NO  Radiology, labs, old available records personally reviewed.                            Assessment and Plan:   This is a 66 year old female pmh DM, HTN, DLD, total thyroidectomy, hypothyroidism, unspecified heart murmur on baby ASA p/w CVA-like symptoms. Hypertensive in ED to 220s/120s.   CTH showing more prominent microvascular changes since prior exam (1/16/19) but no evidence of territorial infarct. CTA H/N showing focal severe stenosis of proximal L intracranial vertebral artery, moderate stenosis of mild basilar artery, no aneurysm/dissection.     #L hemifacial droop and R sided focal deficits 2/2 acute CVA   - Neurology following  - CTH: no territorial infarct but more prominent microvascular changes compared to prior exam on 5/24/19  - CTA H/N: Focal severe stenosis of proximal L intracranial vertebral artery, mod-sev stenosis of mid basilar artery  - MRI acute ischemic strokes in L CR and R occipital stroke   - Noted heart murmur history  - Continue on ASA 81mg, atorvastatin 80 mg, and plavix 75mg qd  - Continue Neurochecks every 4 hours  - A1c 8.2  - lipid panel , ,   - PT/OT/physiatry eval  - TTE with bubble today; RONALD for Tuesday as requested by neuro.   - Cardiology consulted for RONALD   - EPS for loop recorder today     #HTN   - continue on amlodipine 5 mg     #DM  - a1c 8.2   - Monitor finger sticks   - Continue on Basal/bolus insulin  - continue on Lantus 15 Lispro 5/5/5    #DLD  - Continue on atorvastatin 80 mg     #Hypothyroidism  #Hx of complete thyroidectomy  #Hyperenhancing soft tissue structures visualized in the anterior soft tissues of the neck and base of tongue.   - CT angio of neck: Hyperenhancing soft tissue structures visualized in the anterior soft tissues of the neck and base of tongue. Findings may reflect enhancing glandular tissue, ectopic thyroid or mass; this may be confirmed with nuclear medicine thyroid scan  - Thyroid scan in 4wks as an outpatient due to iodine contrast load this admission  - Continue on Levothyroxine     Activity: As tolerated  Diet: DASH/Carb  DVT ppx: Heparin   GI ppx: Protonix  Code Status: Full Code  Dispo: Acute    #Progress Note Handoff  Pending: TTE and TEE________PT____x____  Pt/Family discussion: Pt/family informed and agree with the current plan  Disposition: Home___    My note supersedes the residents note should a discrepancy arise.    Chart and consultant notes personally reviewed.  Care Discussed with Consultants/Other Providers/ Housestaff [ x] YES [ ] NO   Radiology, labs, old records personally reviewed.    d/w Housestaff, nursing, case mgmt, neuro Dr. Rae, daughter, son

## 2021-05-28 NOTE — PROGRESS NOTE ADULT - SUBJECTIVE AND OBJECTIVE BOX
Electrophysiology Brief Post-Op Note    I have personally seen and examined the patient.  I agree with the history and physical which I have reviewed and noted any changes below.  05-28-21 @ 14:20    PRE-OP DIAGNOSIS: Cryptogenic CVA    POST-OP DIAGNOSIS: Cryptogenic CVA    PROCEDURE: Loop Implant    Physician: Dr. Mirza  Assistant: AVE Galindo    ESTIMATED BLOOD LOSS:  2    mL    ANESTHESIA TYPE:  [  ]General Anesthesia  [  ] Sedation  [X  ] Local/Regional    CONDITION  [  ] Critical  [  ] Serious  [  ]Fair  [ X ]Good    SPECIMENS REMOVED (IF APPLICABLE):  none    IMPLANTS (IF APPLICABLE)  Loop Recorder (Medtronic)    FINDINGS  PLAN OF CARE  - F/U 3-4 weeks  - May remove bandaid tomorrow  - May shower in 48 hours

## 2021-05-29 ENCOUNTER — TRANSCRIPTION ENCOUNTER (OUTPATIENT)
Age: 66
End: 2021-05-29

## 2021-05-29 LAB
ANION GAP SERPL CALC-SCNC: 11 MMOL/L — SIGNIFICANT CHANGE UP (ref 7–14)
BASOPHILS # BLD AUTO: 0.05 K/UL — SIGNIFICANT CHANGE UP (ref 0–0.2)
BASOPHILS NFR BLD AUTO: 0.9 % — SIGNIFICANT CHANGE UP (ref 0–1)
BUN SERPL-MCNC: 19 MG/DL — SIGNIFICANT CHANGE UP (ref 10–20)
CALCIUM SERPL-MCNC: 9.3 MG/DL — SIGNIFICANT CHANGE UP (ref 8.5–10.1)
CHLORIDE SERPL-SCNC: 102 MMOL/L — SIGNIFICANT CHANGE UP (ref 98–110)
CO2 SERPL-SCNC: 27 MMOL/L — SIGNIFICANT CHANGE UP (ref 17–32)
CREAT SERPL-MCNC: 0.9 MG/DL — SIGNIFICANT CHANGE UP (ref 0.7–1.5)
EOSINOPHIL # BLD AUTO: 0.1 K/UL — SIGNIFICANT CHANGE UP (ref 0–0.7)
EOSINOPHIL NFR BLD AUTO: 1.7 % — SIGNIFICANT CHANGE UP (ref 0–8)
GLUCOSE BLDC GLUCOMTR-MCNC: 101 MG/DL — HIGH (ref 70–99)
GLUCOSE BLDC GLUCOMTR-MCNC: 110 MG/DL — HIGH (ref 70–99)
GLUCOSE BLDC GLUCOMTR-MCNC: 140 MG/DL — HIGH (ref 70–99)
GLUCOSE BLDC GLUCOMTR-MCNC: 181 MG/DL — HIGH (ref 70–99)
GLUCOSE SERPL-MCNC: 151 MG/DL — HIGH (ref 70–99)
HCT VFR BLD CALC: 39.1 % — SIGNIFICANT CHANGE UP (ref 37–47)
HGB BLD-MCNC: 12.5 G/DL — SIGNIFICANT CHANGE UP (ref 12–16)
IMM GRANULOCYTES NFR BLD AUTO: 0.2 % — SIGNIFICANT CHANGE UP (ref 0.1–0.3)
LYMPHOCYTES # BLD AUTO: 2.37 K/UL — SIGNIFICANT CHANGE UP (ref 1.2–3.4)
LYMPHOCYTES # BLD AUTO: 41.3 % — SIGNIFICANT CHANGE UP (ref 20.5–51.1)
MAGNESIUM SERPL-MCNC: 1.7 MG/DL — LOW (ref 1.8–2.4)
MCHC RBC-ENTMCNC: 25.5 PG — LOW (ref 27–31)
MCHC RBC-ENTMCNC: 32 G/DL — SIGNIFICANT CHANGE UP (ref 32–37)
MCV RBC AUTO: 79.8 FL — LOW (ref 81–99)
MONOCYTES # BLD AUTO: 0.48 K/UL — SIGNIFICANT CHANGE UP (ref 0.1–0.6)
MONOCYTES NFR BLD AUTO: 8.4 % — SIGNIFICANT CHANGE UP (ref 1.7–9.3)
NEUTROPHILS # BLD AUTO: 2.73 K/UL — SIGNIFICANT CHANGE UP (ref 1.4–6.5)
NEUTROPHILS NFR BLD AUTO: 47.5 % — SIGNIFICANT CHANGE UP (ref 42.2–75.2)
NRBC # BLD: 0 /100 WBCS — SIGNIFICANT CHANGE UP (ref 0–0)
PLATELET # BLD AUTO: 186 K/UL — SIGNIFICANT CHANGE UP (ref 130–400)
POTASSIUM SERPL-MCNC: 3.7 MMOL/L — SIGNIFICANT CHANGE UP (ref 3.5–5)
POTASSIUM SERPL-SCNC: 3.7 MMOL/L — SIGNIFICANT CHANGE UP (ref 3.5–5)
RBC # BLD: 4.9 M/UL — SIGNIFICANT CHANGE UP (ref 4.2–5.4)
RBC # FLD: 14.6 % — HIGH (ref 11.5–14.5)
SARS-COV-2 RNA SPEC QL NAA+PROBE: SIGNIFICANT CHANGE UP
SODIUM SERPL-SCNC: 140 MMOL/L — SIGNIFICANT CHANGE UP (ref 135–146)
WBC # BLD: 5.74 K/UL — SIGNIFICANT CHANGE UP (ref 4.8–10.8)
WBC # FLD AUTO: 5.74 K/UL — SIGNIFICANT CHANGE UP (ref 4.8–10.8)

## 2021-05-29 PROCEDURE — 99233 SBSQ HOSP IP/OBS HIGH 50: CPT

## 2021-05-29 RX ADMIN — LACTULOSE 10 GRAM(S): 10 SOLUTION ORAL at 05:36

## 2021-05-29 RX ADMIN — ATORVASTATIN CALCIUM 80 MILLIGRAM(S): 80 TABLET, FILM COATED ORAL at 21:14

## 2021-05-29 RX ADMIN — Medication 1: at 07:36

## 2021-05-29 RX ADMIN — Medication 81 MILLIGRAM(S): at 11:28

## 2021-05-29 RX ADMIN — HEPARIN SODIUM 5000 UNIT(S): 5000 INJECTION INTRAVENOUS; SUBCUTANEOUS at 17:17

## 2021-05-29 RX ADMIN — HEPARIN SODIUM 5000 UNIT(S): 5000 INJECTION INTRAVENOUS; SUBCUTANEOUS at 05:35

## 2021-05-29 RX ADMIN — INSULIN GLARGINE 15 UNIT(S): 100 INJECTION, SOLUTION SUBCUTANEOUS at 21:14

## 2021-05-29 RX ADMIN — Medication 5 UNIT(S): at 17:15

## 2021-05-29 RX ADMIN — AMLODIPINE BESYLATE 5 MILLIGRAM(S): 2.5 TABLET ORAL at 05:36

## 2021-05-29 RX ADMIN — CLOPIDOGREL BISULFATE 75 MILLIGRAM(S): 75 TABLET, FILM COATED ORAL at 11:28

## 2021-05-29 RX ADMIN — LACTULOSE 10 GRAM(S): 10 SOLUTION ORAL at 21:15

## 2021-05-29 RX ADMIN — PANTOPRAZOLE SODIUM 40 MILLIGRAM(S): 20 TABLET, DELAYED RELEASE ORAL at 05:35

## 2021-05-29 RX ADMIN — Medication 137 MICROGRAM(S): at 05:35

## 2021-05-29 RX ADMIN — Medication 5 UNIT(S): at 07:36

## 2021-05-29 NOTE — DISCHARGE NOTE NURSING/CASE MANAGEMENT/SOCIAL WORK - PATIENT PORTAL LINK FT
You can access the FollowMyHealth Patient Portal offered by [facility] by registering at the following website: [email or web address]. By joining [employer]’s FollowMyHealth portal, you will also be able to view your health information using other applications (apps) compatible with our system.

## 2021-05-29 NOTE — PROGRESS NOTE ADULT - SUBJECTIVE AND OBJECTIVE BOX
Hospital Day:  5d    Patient is a 66y old  Female who presents with a chief complaint of 5d    Subjective:  No overnight events. Seen and examined at bedside. Holding for RONALD Monday vs Tuesday. Repeat COVID swab ordered for today to ensure a swab within 72 hours is in place. No acute complaints today.     Past Medical Hx:   Diabetes  Hypertension    Past Sx:  None     Allergies:  No Known Allergies    Current Meds:   Standng Meds:  amLODIPine   Tablet 5 milliGRAM(s) Oral daily  aspirin  chewable 81 milliGRAM(s) Oral daily  atorvastatin 80 milliGRAM(s) Oral at bedtime  clopidogrel Tablet 75 milliGRAM(s) Oral daily  dextrose 40% Gel 15 Gram(s) Oral once  dextrose 5%. 1000 milliLiter(s) (50 mL/Hr) IV Continuous <Continuous>  dextrose 5%. 1000 milliLiter(s) (100 mL/Hr) IV Continuous <Continuous>  dextrose 50% Injectable 25 Gram(s) IV Push once  dextrose 50% Injectable 12.5 Gram(s) IV Push once  dextrose 50% Injectable 25 Gram(s) IV Push once  glucagon  Injectable 1 milliGRAM(s) IntraMuscular once  heparin   Injectable 5000 Unit(s) SubCutaneous every 12 hours  insulin glargine Injectable (LANTUS) 15 Unit(s) SubCutaneous at bedtime  insulin lispro (ADMELOG) corrective regimen sliding scale   SubCutaneous three times a day before meals  insulin lispro Injectable (ADMELOG) 5 Unit(s) SubCutaneous three times a day before meals  levothyroxine 137 MICROGram(s) Oral daily  pantoprazole    Tablet 40 milliGRAM(s) Oral before breakfast    PRN Meds:  lactulose Syrup 10 Gram(s) Oral two times a day PRN constipation    HOME MEDICATIONS:  amLODIPine 5 mg oral tablet: 1 tab(s) orally once a day  aspirin 81 mg oral tablet: 1 tab(s) orally once a day  Crestor 5 mg oral tablet: 1 tab(s) orally once a day  folic acid 1 mg oral tablet: 1 tab(s) orally once a day  levothyroxine 137 mcg (0.137 mg) oral tablet: 1 tab(s) orally once a day  losartan-hydrochlorothiazide 100 mg-25 mg oral tablet: 1 tab(s) orally once a day  metFORMIN 1000 mg oral tablet: 1 tab(s) orally 2 times a day  Mobic 15 mg oral tablet: 1 tab(s) orally once a day, As Needed      Vital Signs:   T(F): 96.9 (05-29-21 @ 04:38), Max: 98.1 (05-28-21 @ 13:13)  HR: 72 (05-29-21 @ 04:38) (61 - 86)  BP: 147/87 (05-29-21 @ 04:38) (113/77 - 147/87)  RR: 18 (05-29-21 @ 04:38) (18 - 19)  SpO2: --    Physical Exam:   GENERAL: NAD, pleasant and conversive, appearing stated age  HEENT: NCAT, PERRLA, EOMI  CHEST/LUNG: Clear to auscultation bilaterally, no wheezing, rhonchi, rales   HEART: Regular rate and rhythm; s1 s2 appreciated, No murmurs, rubs, or gallops  ABDOMEN: Soft, Nontender, Nondistended; Bowel sounds present  EXTREMITIES: No LE edema b/l  SKIN: no rashes, no new lesions  NERVOUS SYSTEM:  Alert & Oriented X3, sensation equal and intact bilaterally,   LINES/CATHETERS: peripheral IV    Labs:                         12.5   5.74  )-----------( 186      ( 29 May 2021 06:02 )             39.1     Neutophil% 47.5, Lymphocyte% 41.3, Monocyte% 8.4, Bands% 0.2 05-29-21 @ 06:02    Troponin <0.01, CKMB --, CK -- 05-25-21 @ 11:24    Radiology:       Assessment and Plan:   This is a 66 year old female pmh DM, HTN, DLD, total thyroidectomy, hypothyroidism, unspecified heart murmur on baby ASA p/w CVA-like symptoms. Hypertensive in ED to 220s/120s.   CTH showing more prominent microvascular changes since prior exam (1/16/19) but no evidence of territorial infarct. CTA H/N showing focal severe stenosis of proximal L intracranial vertebral artery, moderate stenosis of mild basilar artery, no aneurysm/dissection.     #L hemifacial droop and R sided focal deficits 2/2 acute CVA   - Neurology following  - CTH: no territorial infarct but more prominent microvascular changes compared to prior exam on 5/24/19  - CTA H/N: Focal severe stenosis of proximal L intracranial vertebral artery, mod-sev stenosis of mid basilar artery  - MRI acute ischemic strokes in L CR and R occipital stroke   - Noted heart murmur history  - Continue on ASA 81mg, atorvastatin 80 mg, and plavix 75mg qd  - Continue Neurochecks every 4 hours  - A1c 8.2  - lipid panel , ,   - PT/OT/physiatry eval  - TTE with bubble: EF 51%, bubble negative for PFO, G1DD  - Cardiology consulted for RONALD; plan for Monday vs Tuesday  - EPS for loop recorder today     #HTN   - continue on amlodipine 5 mg     #DM  - a1c 8.2   - Monitor finger sticks   - Continue on Basal/bolus insulin  - continue on Lantus 15 Lispro 5/5/5    #DLD  - Continue on atorvastatin 80 mg     #Hypothyroidism  #Hx of complete thyroidectomy  #Hyperenhancing soft tissue structures visualized in the anterior soft tissues of the neck and base of tongue.   - CT angio of neck: Hyperenhancing soft tissue structures visualized in the anterior soft tissues of the neck and base of tongue. Findings may reflect enhancing glandular tissue, ectopic thyroid or mass; this may be confirmed with nuclear medicine thyroid scan  - Thyroid scan as an outpatient due to iodine contrast load  - Continue on Levothyroxine     Activity: As tolerated  Diet: DASH/Carb  DVT ppx: Heparin   GI ppx: Protonix  Code Status: Full Code  Dispo: Acute

## 2021-05-30 LAB
GLUCOSE BLDC GLUCOMTR-MCNC: 117 MG/DL — HIGH (ref 70–99)
GLUCOSE BLDC GLUCOMTR-MCNC: 169 MG/DL — HIGH (ref 70–99)
GLUCOSE BLDC GLUCOMTR-MCNC: 193 MG/DL — HIGH (ref 70–99)
GLUCOSE BLDC GLUCOMTR-MCNC: 96 MG/DL — SIGNIFICANT CHANGE UP (ref 70–99)

## 2021-05-30 PROCEDURE — 99232 SBSQ HOSP IP/OBS MODERATE 35: CPT

## 2021-05-30 RX ORDER — LOSARTAN POTASSIUM 100 MG/1
100 TABLET, FILM COATED ORAL DAILY
Refills: 0 | Status: DISCONTINUED | OUTPATIENT
Start: 2021-05-30 | End: 2021-06-01

## 2021-05-30 RX ORDER — SENNA PLUS 8.6 MG/1
2 TABLET ORAL AT BEDTIME
Refills: 0 | Status: DISCONTINUED | OUTPATIENT
Start: 2021-05-30 | End: 2021-06-01

## 2021-05-30 RX ORDER — HYDRALAZINE HCL 50 MG
25 TABLET ORAL ONCE
Refills: 0 | Status: DISCONTINUED | OUTPATIENT
Start: 2021-05-30 | End: 2021-05-30

## 2021-05-30 RX ORDER — HYDROCHLOROTHIAZIDE 25 MG
25 TABLET ORAL DAILY
Refills: 0 | Status: DISCONTINUED | OUTPATIENT
Start: 2021-05-30 | End: 2021-06-01

## 2021-05-30 RX ORDER — AMLODIPINE BESYLATE 2.5 MG/1
10 TABLET ORAL DAILY
Refills: 0 | Status: DISCONTINUED | OUTPATIENT
Start: 2021-05-30 | End: 2021-06-01

## 2021-05-30 RX ADMIN — Medication 137 MICROGRAM(S): at 05:25

## 2021-05-30 RX ADMIN — LACTULOSE 10 GRAM(S): 10 SOLUTION ORAL at 21:08

## 2021-05-30 RX ADMIN — Medication 5 UNIT(S): at 11:36

## 2021-05-30 RX ADMIN — AMLODIPINE BESYLATE 5 MILLIGRAM(S): 2.5 TABLET ORAL at 05:25

## 2021-05-30 RX ADMIN — SENNA PLUS 2 TABLET(S): 8.6 TABLET ORAL at 21:08

## 2021-05-30 RX ADMIN — ATORVASTATIN CALCIUM 80 MILLIGRAM(S): 80 TABLET, FILM COATED ORAL at 21:08

## 2021-05-30 RX ADMIN — HEPARIN SODIUM 5000 UNIT(S): 5000 INJECTION INTRAVENOUS; SUBCUTANEOUS at 05:25

## 2021-05-30 RX ADMIN — HEPARIN SODIUM 5000 UNIT(S): 5000 INJECTION INTRAVENOUS; SUBCUTANEOUS at 17:28

## 2021-05-30 RX ADMIN — Medication 1: at 07:43

## 2021-05-30 RX ADMIN — Medication 81 MILLIGRAM(S): at 11:05

## 2021-05-30 RX ADMIN — Medication 5 UNIT(S): at 17:12

## 2021-05-30 RX ADMIN — PANTOPRAZOLE SODIUM 40 MILLIGRAM(S): 20 TABLET, DELAYED RELEASE ORAL at 05:25

## 2021-05-30 RX ADMIN — Medication 10 MILLIGRAM(S): at 21:09

## 2021-05-30 RX ADMIN — LOSARTAN POTASSIUM 100 MILLIGRAM(S): 100 TABLET, FILM COATED ORAL at 11:02

## 2021-05-30 RX ADMIN — INSULIN GLARGINE 15 UNIT(S): 100 INJECTION, SOLUTION SUBCUTANEOUS at 21:09

## 2021-05-30 RX ADMIN — Medication 5 UNIT(S): at 07:44

## 2021-05-30 RX ADMIN — CLOPIDOGREL BISULFATE 75 MILLIGRAM(S): 75 TABLET, FILM COATED ORAL at 11:05

## 2021-05-30 NOTE — PROGRESS NOTE ADULT - ASSESSMENT
66 year old female pmh DM, HTN, DLD, total thyroidectomy, hypothyroidism, unspecified heart murmur on baby ASA p/w CVA-like symptoms. Hypertensive in ED to 220s/120s. CTH showing more prominent microvascular changes since prior exam (1/16/19) but no evidence of territorial infarct. CTA H/N showing focal severe stenosis of proximal L intracranial vertebral artery, moderate stenosis of mild basilar artery, no aneurysm/dissection.     # L hemifacial droop and R sided focal deficits 2/2 acute CVA   - Neurology following  - CTH: no territorial infarct but more prominent microvascular changes compared to prior exam on 5/24/19  - CTA H/N: Focal severe stenosis of proximal L intracranial vertebral artery, mod-sev stenosis of mid basilar artery  - MRI acute ischemic strokes in L CR and R occipital stroke   - Continue on ASA 81mg, atorvastatin 80 mg, and plavix 75mg qd  - A1c 8.2  - lipid panel , ,   - PT/OT/physiatry eval  - TTE with bubble: EF 51%, bubble negative for PFO, G1DD  - Cardiology consulted for RONALD; plan for Monday vs Tuesday  - loop recorder placed    # Dysphagia - SLP eval appreciated, diet changed    # HTN - Not controlled, pt noted to have high BP, Norvasc increased to 10 mg, cont Losartan 100 mg, HCTZ 25 mg resumed, monitor BP, keep <180. If not controlled can add Labetalol     # DM  - a1c 8.2   - Monitor finger sticks   - Continue on Basal/bolus insulin  - continue on Lantus 15 Lispro 5/5/5    # DLD  - Continue on atorvastatin 80 mg     # Hypothyroidism, Hx of complete thyroidectomy - check TSH  #Hyperenhancing soft tissue structures visualized in the anterior soft tissues of the neck and base of tongue.   - CT angio of neck: Hyperenhancing soft tissue structures visualized in the anterior soft tissues of the neck and base of tongue. Findings may reflect enhancing glandular tissue, ectopic thyroid or mass; this may be confirmed with nuclear medicine thyroid scan  - Thyroid scan as an outpatient due to iodine contrast load  - Continue on Levothyroxine     # Constipation - bowel regimen added    Activity: As tolerated  DVT ppx: Heparin     # Progress Note Handoff  Pending RONALD likely on Tuesday

## 2021-05-30 NOTE — SWALLOW BEDSIDE ASSESSMENT ADULT - SLP PERTINENT HISTORY OF CURRENT PROBLEM
66 F pmh DM, HTN, DLD, total thyroidectomy, hypothyroidism, unspecified heart murmur on baby ASA p/w CVA-like symptoms x 3d. 3 days ago patient states she had difficulty writing (R-handed) and her children told her her face "looked strange" and that she was speaking slowly. Focal acute infarcts involving the left corona radiata and right occipital lobe without evidence of hemorrhagic transformation

## 2021-05-30 NOTE — SWALLOW BEDSIDE ASSESSMENT ADULT - COMMENTS
pt received alert, on RA. +dysarthric. slight r facial asymmetry. pt reports occasional 'choking' on saliva.

## 2021-05-30 NOTE — SWALLOW BEDSIDE ASSESSMENT ADULT - SWALLOW EVAL: DIAGNOSIS
mild oral dysphagia for Dysphagia Diet II mechanical soft consistency with ground meat, thins w/no overt s/s of aspiration/penetration. pt prefers soft at this time.

## 2021-05-30 NOTE — PROGRESS NOTE ADULT - SUBJECTIVE AND OBJECTIVE BOX
JOE FORREST    Patient is a 66y old  Female who presents with a chief complaint of Facial droop (29 May 2021 08:00)    INTERVAL HPI/OVERNIGHT EVENTS: no events overnight, no new complaints    ROS: All ROS negative except +mild dysphagia, +constipation.    PHYSICAL EXAM:  T(C): 36.6, Max: 36.6 (05-30-21 @ 12:54)  HR: 65 (61 - 77)  BP: 150/90 (150/90 - 200/100)  RR: 18 (18 - 18)  SpO2: 99% (99% - 99%)    GENERAL: NAD, obese  PULMONARY/CHEST: No rales, rhonchi, wheezing  CARDIOVASC: Regular rate and rhythm; no murmur  GI/ABDOMEN: Soft, Nontender, Nondistended; Bowel sounds present  EXTREMITIES:  2+ Peripheral Pulses, No clubbing, cyanosis, or edema, no deformity. No calf tenderness b/l.  NERVOUS SYSTEM:  Alert & Oriented X3, no focal deficit     LABS:                        12.5   5.74  )-----------( 186      ( 29 May 2021 06:02 )             39.1     05-29    140  |  102  |  19  ----------------------------<  151<H>  3.7   |  27  |  0.9    Ca    9.3      29 May 2021 06:02  Mg     1.7     05-29      CAPILLARY BLOOD GLUCOSE  POCT Blood Glucose.: 117 mg/dL (30 May 2021 16:20)  POCT Blood Glucose.: 96 mg/dL (30 May 2021 11:30)  POCT Blood Glucose.: 169 mg/dL (30 May 2021 07:34)  POCT Blood Glucose.: 140 mg/dL (29 May 2021 20:45)      MEDICATIONS  (STANDING):  amLODIPine   Tablet 5 milliGRAM(s) Oral daily  aspirin  chewable 81 milliGRAM(s) Oral daily  atorvastatin 80 milliGRAM(s) Oral at bedtime  clopidogrel Tablet 75 milliGRAM(s) Oral daily  dextrose 40% Gel 15 Gram(s) Oral once  dextrose 5%. 1000 milliLiter(s) (50 mL/Hr) IV Continuous <Continuous>  dextrose 5%. 1000 milliLiter(s) (100 mL/Hr) IV Continuous <Continuous>  dextrose 50% Injectable 25 Gram(s) IV Push once  dextrose 50% Injectable 12.5 Gram(s) IV Push once  dextrose 50% Injectable 25 Gram(s) IV Push once  glucagon  Injectable 1 milliGRAM(s) IntraMuscular once  heparin   Injectable 5000 Unit(s) SubCutaneous every 12 hours  insulin glargine Injectable (LANTUS) 15 Unit(s) SubCutaneous at bedtime  insulin lispro (ADMELOG) corrective regimen sliding scale   SubCutaneous three times a day before meals  insulin lispro Injectable (ADMELOG) 5 Unit(s) SubCutaneous three times a day before meals  levothyroxine 137 MICROGram(s) Oral daily  losartan 100 milliGRAM(s) Oral daily  pantoprazole    Tablet 40 milliGRAM(s) Oral before breakfast    MEDICATIONS  (PRN):  lactulose Syrup 10 Gram(s) Oral two times a day PRN constipation

## 2021-05-31 LAB
ANION GAP SERPL CALC-SCNC: 10 MMOL/L — SIGNIFICANT CHANGE UP (ref 7–14)
BASOPHILS # BLD AUTO: 0.04 K/UL — SIGNIFICANT CHANGE UP (ref 0–0.2)
BASOPHILS NFR BLD AUTO: 0.9 % — SIGNIFICANT CHANGE UP (ref 0–1)
BUN SERPL-MCNC: 9 MG/DL — LOW (ref 10–20)
CALCIUM SERPL-MCNC: 9.6 MG/DL — SIGNIFICANT CHANGE UP (ref 8.5–10.1)
CHLORIDE SERPL-SCNC: 105 MMOL/L — SIGNIFICANT CHANGE UP (ref 98–110)
CO2 SERPL-SCNC: 27 MMOL/L — SIGNIFICANT CHANGE UP (ref 17–32)
CREAT SERPL-MCNC: 0.7 MG/DL — SIGNIFICANT CHANGE UP (ref 0.7–1.5)
EOSINOPHIL # BLD AUTO: 0.07 K/UL — SIGNIFICANT CHANGE UP (ref 0–0.7)
EOSINOPHIL NFR BLD AUTO: 1.5 % — SIGNIFICANT CHANGE UP (ref 0–8)
GLUCOSE BLDC GLUCOMTR-MCNC: 129 MG/DL — HIGH (ref 70–99)
GLUCOSE BLDC GLUCOMTR-MCNC: 131 MG/DL — HIGH (ref 70–99)
GLUCOSE BLDC GLUCOMTR-MCNC: 150 MG/DL — HIGH (ref 70–99)
GLUCOSE BLDC GLUCOMTR-MCNC: 78 MG/DL — SIGNIFICANT CHANGE UP (ref 70–99)
GLUCOSE SERPL-MCNC: 111 MG/DL — HIGH (ref 70–99)
HCT VFR BLD CALC: 37.6 % — SIGNIFICANT CHANGE UP (ref 37–47)
HGB BLD-MCNC: 12 G/DL — SIGNIFICANT CHANGE UP (ref 12–16)
IMM GRANULOCYTES NFR BLD AUTO: 0.2 % — SIGNIFICANT CHANGE UP (ref 0.1–0.3)
LYMPHOCYTES # BLD AUTO: 2.07 K/UL — SIGNIFICANT CHANGE UP (ref 1.2–3.4)
LYMPHOCYTES # BLD AUTO: 45.1 % — SIGNIFICANT CHANGE UP (ref 20.5–51.1)
MAGNESIUM SERPL-MCNC: 1.7 MG/DL — LOW (ref 1.8–2.4)
MCHC RBC-ENTMCNC: 25.7 PG — LOW (ref 27–31)
MCHC RBC-ENTMCNC: 31.9 G/DL — LOW (ref 32–37)
MCV RBC AUTO: 80.5 FL — LOW (ref 81–99)
MONOCYTES # BLD AUTO: 0.38 K/UL — SIGNIFICANT CHANGE UP (ref 0.1–0.6)
MONOCYTES NFR BLD AUTO: 8.3 % — SIGNIFICANT CHANGE UP (ref 1.7–9.3)
NEUTROPHILS # BLD AUTO: 2.02 K/UL — SIGNIFICANT CHANGE UP (ref 1.4–6.5)
NEUTROPHILS NFR BLD AUTO: 44 % — SIGNIFICANT CHANGE UP (ref 42.2–75.2)
NRBC # BLD: 0 /100 WBCS — SIGNIFICANT CHANGE UP (ref 0–0)
PLATELET # BLD AUTO: 181 K/UL — SIGNIFICANT CHANGE UP (ref 130–400)
POTASSIUM SERPL-MCNC: 3.3 MMOL/L — LOW (ref 3.5–5)
POTASSIUM SERPL-SCNC: 3.3 MMOL/L — LOW (ref 3.5–5)
RBC # BLD: 4.67 M/UL — SIGNIFICANT CHANGE UP (ref 4.2–5.4)
RBC # FLD: 14.4 % — SIGNIFICANT CHANGE UP (ref 11.5–14.5)
SODIUM SERPL-SCNC: 142 MMOL/L — SIGNIFICANT CHANGE UP (ref 135–146)
TSH SERPL-MCNC: 7.65 UIU/ML — HIGH (ref 0.27–4.2)
WBC # BLD: 4.59 K/UL — LOW (ref 4.8–10.8)
WBC # FLD AUTO: 4.59 K/UL — LOW (ref 4.8–10.8)

## 2021-05-31 PROCEDURE — 99232 SBSQ HOSP IP/OBS MODERATE 35: CPT

## 2021-05-31 RX ORDER — POTASSIUM CHLORIDE 20 MEQ
40 PACKET (EA) ORAL ONCE
Refills: 0 | Status: COMPLETED | OUTPATIENT
Start: 2021-05-31 | End: 2021-05-31

## 2021-05-31 RX ORDER — MAGNESIUM SULFATE 500 MG/ML
2 VIAL (ML) INJECTION ONCE
Refills: 0 | Status: COMPLETED | OUTPATIENT
Start: 2021-05-31 | End: 2021-05-31

## 2021-05-31 RX ADMIN — SENNA PLUS 2 TABLET(S): 8.6 TABLET ORAL at 21:29

## 2021-05-31 RX ADMIN — CLOPIDOGREL BISULFATE 75 MILLIGRAM(S): 75 TABLET, FILM COATED ORAL at 11:39

## 2021-05-31 RX ADMIN — Medication 5 UNIT(S): at 07:27

## 2021-05-31 RX ADMIN — Medication 81 MILLIGRAM(S): at 11:39

## 2021-05-31 RX ADMIN — LOSARTAN POTASSIUM 100 MILLIGRAM(S): 100 TABLET, FILM COATED ORAL at 05:44

## 2021-05-31 RX ADMIN — INSULIN GLARGINE 15 UNIT(S): 100 INJECTION, SOLUTION SUBCUTANEOUS at 21:29

## 2021-05-31 RX ADMIN — AMLODIPINE BESYLATE 10 MILLIGRAM(S): 2.5 TABLET ORAL at 05:44

## 2021-05-31 RX ADMIN — Medication 50 GRAM(S): at 10:28

## 2021-05-31 RX ADMIN — Medication 137 MICROGRAM(S): at 05:44

## 2021-05-31 RX ADMIN — HEPARIN SODIUM 5000 UNIT(S): 5000 INJECTION INTRAVENOUS; SUBCUTANEOUS at 05:45

## 2021-05-31 RX ADMIN — PANTOPRAZOLE SODIUM 40 MILLIGRAM(S): 20 TABLET, DELAYED RELEASE ORAL at 05:44

## 2021-05-31 RX ADMIN — ATORVASTATIN CALCIUM 80 MILLIGRAM(S): 80 TABLET, FILM COATED ORAL at 21:29

## 2021-05-31 RX ADMIN — Medication 25 MILLIGRAM(S): at 05:45

## 2021-05-31 RX ADMIN — Medication 40 MILLIEQUIVALENT(S): at 10:27

## 2021-05-31 RX ADMIN — Medication 5 UNIT(S): at 16:33

## 2021-05-31 RX ADMIN — HEPARIN SODIUM 5000 UNIT(S): 5000 INJECTION INTRAVENOUS; SUBCUTANEOUS at 17:14

## 2021-05-31 NOTE — PROGRESS NOTE ADULT - ASSESSMENT
66 year old female pmh DM, HTN, DLD, total thyroidectomy, hypothyroidism, unspecified heart murmur on baby ASA p/w CVA-like symptoms. Hypertensive in ED to 220s/120s. CTH showing more prominent microvascular changes since prior exam (1/16/19) but no evidence of territorial infarct. CTA H/N showing focal severe stenosis of proximal L intracranial vertebral artery, moderate stenosis of mild basilar artery, no aneurysm/dissection.     # L hemifacial droop and R sided focal deficits 2/2 acute CVA   - Neurology following  - CTH: no territorial infarct but more prominent microvascular changes compared to prior exam on 5/24/19  - CTA H/N: Focal severe stenosis of proximal L intracranial vertebral artery, mod-sev stenosis of mid basilar artery  - MRI acute ischemic strokes in L CR and R occipital stroke   - Continue on ASA 81mg, atorvastatin 80 mg, and plavix 75mg qd  - A1c 8.2  - lipid panel , ,   - PT/OT/physiatry eval  - TTE with bubble: EF 51%, bubble negative for PFO, G1DD  - Cardiology consulted for RONALD; plan for Tuesday  - loop recorder placed    # Hypomagnesemia - supplemented  # Hypokalemia - supplemented   # Dysphagia - SLP eval appreciated, diet changed    # HTN - Not controlled, pt noted to have high BP, Norvasc increased to 10 mg, cont Losartan 100 mg, HCTZ 25 mg resumed, monitor BP, keep <180. If not controlled can add Labetalol     # DM  - a1c 8.2   - Monitor finger sticks   - Continue on Basal/bolus insulin  - continue on Lantus 15 Lispro 5/5/5    # DLD  - Continue on atorvastatin 80 mg     # Hypothyroidism, Hx of complete thyroidectomy - check TSH  #Hyperenhancing soft tissue structures visualized in the anterior soft tissues of the neck and base of tongue.   - CT angio of neck: Hyperenhancing soft tissue structures visualized in the anterior soft tissues of the neck and base of tongue. Findings may reflect enhancing glandular tissue, ectopic thyroid or mass; this may be confirmed with nuclear medicine thyroid scan  - Thyroid scan as an outpatient due to iodine contrast load  - Continue on Levothyroxine     # Constipation - bowel regimen added    Activity: As tolerated  DVT ppx: Heparin     # Progress Note Handoff  Pending RONALD likely on Tuesday

## 2021-05-31 NOTE — PROGRESS NOTE ADULT - SUBJECTIVE AND OBJECTIVE BOX
JOE FORREST    Patient is a 66y old  Female who presents with a chief complaint of Facial droop (30 May 2021 16:44)    INTERVAL HPI/OVERNIGHT EVENTS: no events overnight. Pt had BM. No new complaints.     ROS: All ROS negative except + intermittent choking on saliva    PHYSICAL EXAM:  T(C): 36.1, Max: 36.1 (05-31-21 @ 14:22)  HR: 74 (69 - 74)  BP: 117/65 (117/65 - 174/94)  RR: 18 (18 - 18)  SpO2: --    GENERAL: NAD, obese  PULMONARY/CHEST: No rales, rhonchi, wheezing  CARDIOVASC: Regular rate and rhythm; no murmur  GI/ABDOMEN: Soft, Nontender, Nondistended; Bowel sounds present  EXTREMITIES:  2+ Peripheral Pulses, No clubbing, cyanosis, or edema, no deformity. No calf tenderness b/l.  NERVOUS SYSTEM:  Alert & Oriented X3, no focal deficit     LABS:                        12.0   4.59  )-----------( 181      ( 31 May 2021 05:33 )             37.6     05-31    142  |  105  |  9<L>  ----------------------------<  111<H>  3.3<L>   |  27  |  0.7    Ca    9.6      31 May 2021 05:33  Mg     1.7     05-31          CAPILLARY BLOOD GLUCOSE  POCT Blood Glucose.: 131 mg/dL (31 May 2021 16:26)  POCT Blood Glucose.: 78 mg/dL (31 May 2021 11:22)  POCT Blood Glucose.: 129 mg/dL (31 May 2021 07:34)  POCT Blood Glucose.: 193 mg/dL (30 May 2021 20:41)      MEDICATIONS  (STANDING):  amLODIPine   Tablet 10 milliGRAM(s) Oral daily  aspirin  chewable 81 milliGRAM(s) Oral daily  atorvastatin 80 milliGRAM(s) Oral at bedtime  clopidogrel Tablet 75 milliGRAM(s) Oral daily  dextrose 40% Gel 15 Gram(s) Oral once  dextrose 5%. 1000 milliLiter(s) (50 mL/Hr) IV Continuous <Continuous>  dextrose 5%. 1000 milliLiter(s) (100 mL/Hr) IV Continuous <Continuous>  dextrose 50% Injectable 25 Gram(s) IV Push once  dextrose 50% Injectable 12.5 Gram(s) IV Push once  dextrose 50% Injectable 25 Gram(s) IV Push once  glucagon  Injectable 1 milliGRAM(s) IntraMuscular once  heparin   Injectable 5000 Unit(s) SubCutaneous every 12 hours  hydrochlorothiazide 25 milliGRAM(s) Oral daily  insulin glargine Injectable (LANTUS) 15 Unit(s) SubCutaneous at bedtime  insulin lispro (ADMELOG) corrective regimen sliding scale   SubCutaneous three times a day before meals  insulin lispro Injectable (ADMELOG) 5 Unit(s) SubCutaneous three times a day before meals  levothyroxine 137 MICROGram(s) Oral daily  losartan 100 milliGRAM(s) Oral daily  pantoprazole    Tablet 40 milliGRAM(s) Oral before breakfast  senna 2 Tablet(s) Oral at bedtime    MEDICATIONS  (PRN):  lactulose Syrup 10 Gram(s) Oral two times a day PRN constipation

## 2021-05-31 NOTE — CHART NOTE - NSCHARTNOTEFT_GEN_A_CORE
RONALD is initially for today, however, will be postponed to 6/1 tomorrow  keep NPO after midnight tonight  please obtain COVID-19 swap today, as per protocol, COVID-19 PCR has to be valid within 72 hours from the scheduled procedure  nurse on the floor informed of the change, however, unable to reach out to resident taking care of patient RONALD is initially for today, however, will be postponed to 6/1 tomorrow  keep NPO after midnight tonight  nurse on the floor and resident on call informed of the change

## 2021-06-01 ENCOUNTER — TRANSCRIPTION ENCOUNTER (OUTPATIENT)
Age: 66
End: 2021-06-01

## 2021-06-01 VITALS — WEIGHT: 186.07 LBS | HEIGHT: 64 IN

## 2021-06-01 PROBLEM — Z00.00 ENCOUNTER FOR PREVENTIVE HEALTH EXAMINATION: Status: ACTIVE | Noted: 2021-06-01

## 2021-06-01 LAB
ANION GAP SERPL CALC-SCNC: 10 MMOL/L — SIGNIFICANT CHANGE UP (ref 7–14)
BUN SERPL-MCNC: 18 MG/DL — SIGNIFICANT CHANGE UP (ref 10–20)
CALCIUM SERPL-MCNC: 9 MG/DL — SIGNIFICANT CHANGE UP (ref 8.5–10.1)
CHLORIDE SERPL-SCNC: 103 MMOL/L — SIGNIFICANT CHANGE UP (ref 98–110)
CO2 SERPL-SCNC: 28 MMOL/L — SIGNIFICANT CHANGE UP (ref 17–32)
CREAT SERPL-MCNC: 1.1 MG/DL — SIGNIFICANT CHANGE UP (ref 0.7–1.5)
GLUCOSE BLDC GLUCOMTR-MCNC: 114 MG/DL — HIGH (ref 70–99)
GLUCOSE BLDC GLUCOMTR-MCNC: 126 MG/DL — HIGH (ref 70–99)
GLUCOSE BLDC GLUCOMTR-MCNC: 160 MG/DL — HIGH (ref 70–99)
GLUCOSE SERPL-MCNC: 168 MG/DL — HIGH (ref 70–99)
MAGNESIUM SERPL-MCNC: 2.2 MG/DL — SIGNIFICANT CHANGE UP (ref 1.8–2.4)
POTASSIUM SERPL-MCNC: 3.8 MMOL/L — SIGNIFICANT CHANGE UP (ref 3.5–5)
POTASSIUM SERPL-SCNC: 3.8 MMOL/L — SIGNIFICANT CHANGE UP (ref 3.5–5)
SODIUM SERPL-SCNC: 141 MMOL/L — SIGNIFICANT CHANGE UP (ref 135–146)

## 2021-06-01 PROCEDURE — 93320 DOPPLER ECHO COMPLETE: CPT | Mod: 26

## 2021-06-01 PROCEDURE — 99232 SBSQ HOSP IP/OBS MODERATE 35: CPT

## 2021-06-01 PROCEDURE — 75572 CT HRT W/3D IMAGE: CPT | Mod: 26

## 2021-06-01 PROCEDURE — 93325 DOPPLER ECHO COLOR FLOW MAPG: CPT | Mod: 26

## 2021-06-01 PROCEDURE — 93312 ECHO TRANSESOPHAGEAL: CPT | Mod: 26

## 2021-06-01 PROCEDURE — 99239 HOSP IP/OBS DSCHRG MGMT >30: CPT

## 2021-06-01 RX ORDER — MELOXICAM 15 MG/1
1 TABLET ORAL
Qty: 0 | Refills: 0 | DISCHARGE

## 2021-06-01 RX ORDER — AMLODIPINE BESYLATE 2.5 MG/1
1 TABLET ORAL
Qty: 0 | Refills: 0 | DISCHARGE
Start: 2021-06-01

## 2021-06-01 RX ORDER — AMLODIPINE BESYLATE 2.5 MG/1
1 TABLET ORAL
Qty: 0 | Refills: 0 | DISCHARGE

## 2021-06-01 RX ORDER — ASPIRIN/CALCIUM CARB/MAGNESIUM 324 MG
1 TABLET ORAL
Qty: 0 | Refills: 0 | DISCHARGE

## 2021-06-01 RX ORDER — ATORVASTATIN CALCIUM 80 MG/1
1 TABLET, FILM COATED ORAL
Qty: 30 | Refills: 0
Start: 2021-06-01 | End: 2021-06-30

## 2021-06-01 RX ORDER — METOPROLOL TARTRATE 50 MG
25 TABLET ORAL ONCE
Refills: 0 | Status: COMPLETED | OUTPATIENT
Start: 2021-06-01 | End: 2021-06-01

## 2021-06-01 RX ORDER — CLOPIDOGREL BISULFATE 75 MG/1
1 TABLET, FILM COATED ORAL
Qty: 30 | Refills: 0
Start: 2021-06-01 | End: 2021-06-30

## 2021-06-01 RX ORDER — ROSUVASTATIN CALCIUM 5 MG/1
1 TABLET ORAL
Qty: 0 | Refills: 0 | DISCHARGE

## 2021-06-01 RX ORDER — SENNA PLUS 8.6 MG/1
2 TABLET ORAL
Qty: 60 | Refills: 0
Start: 2021-06-01 | End: 2021-06-30

## 2021-06-01 RX ORDER — AMLODIPINE BESYLATE 2.5 MG/1
1 TABLET ORAL
Qty: 30 | Refills: 0
Start: 2021-06-01 | End: 2021-06-30

## 2021-06-01 RX ORDER — METFORMIN HYDROCHLORIDE 850 MG/1
1 TABLET ORAL
Qty: 0 | Refills: 0 | DISCHARGE

## 2021-06-01 RX ORDER — METOPROLOL TARTRATE 50 MG
50 TABLET ORAL ONCE
Refills: 0 | Status: COMPLETED | OUTPATIENT
Start: 2021-06-01 | End: 2021-06-01

## 2021-06-01 RX ADMIN — Medication 25 MILLIGRAM(S): at 05:38

## 2021-06-01 RX ADMIN — HEPARIN SODIUM 5000 UNIT(S): 5000 INJECTION INTRAVENOUS; SUBCUTANEOUS at 18:01

## 2021-06-01 RX ADMIN — PANTOPRAZOLE SODIUM 40 MILLIGRAM(S): 20 TABLET, DELAYED RELEASE ORAL at 05:38

## 2021-06-01 RX ADMIN — Medication 137 MICROGRAM(S): at 05:35

## 2021-06-01 RX ADMIN — LOSARTAN POTASSIUM 100 MILLIGRAM(S): 100 TABLET, FILM COATED ORAL at 05:38

## 2021-06-01 RX ADMIN — HEPARIN SODIUM 5000 UNIT(S): 5000 INJECTION INTRAVENOUS; SUBCUTANEOUS at 05:38

## 2021-06-01 RX ADMIN — Medication 81 MILLIGRAM(S): at 12:38

## 2021-06-01 RX ADMIN — Medication 25 MILLIGRAM(S): at 12:37

## 2021-06-01 RX ADMIN — CLOPIDOGREL BISULFATE 75 MILLIGRAM(S): 75 TABLET, FILM COATED ORAL at 12:38

## 2021-06-01 RX ADMIN — Medication 5 UNIT(S): at 18:00

## 2021-06-01 RX ADMIN — AMLODIPINE BESYLATE 10 MILLIGRAM(S): 2.5 TABLET ORAL at 05:38

## 2021-06-01 NOTE — DISCHARGE NOTE PROVIDER - NSDCCPCAREPLAN_GEN_ALL_CORE_FT
PRINCIPAL DISCHARGE DIAGNOSIS  Diagnosis: CVA (cerebral vascular accident)  Assessment and Plan of Treatment: You were found to have a CVA on admission. Left sided CVA. CT imaging noted:  -Focal severe stenosis of the proximal left intracranial vertebral artery.  -Moderate to severe stenosis of the mid basilar artery.  -No CTA evidence of aneurysm or dissection.  -Hyperenhancing soft tissue structures visualized in the anterior soft tissues of the neck and base of tongue. Findings may reflect enhancing glandular tissue, ectopic thyroid or mass; this may be confirmed with nuclear medicine thyroid scan.  MRI noted: Focal acute infarcts involving the left corona radiata and right occipital lobe without evidence of hemorrhagic transformation.  RONALD ultimately negative with CT angio of TURNER noted no thrombus.   Medications prescrbied: ASA 81mg and Plavix 75mg. Statin increased to atorvastatin 80mg from Crestor 5. You will have outpatient PT; prescription provided to you.  A loop recorder was implanted and you will need to follow up with electrophysiologist on the outside. They will be in contact with you and their information is provided in this packet.       PRINCIPAL DISCHARGE DIAGNOSIS  Diagnosis: CVA (cerebral vascular accident)  Assessment and Plan of Treatment: You were found to have a CVA on admission. Left sided CVA. CT imaging noted:  -Focal severe stenosis of the proximal left intracranial vertebral artery.  -Moderate to severe stenosis of the mid basilar artery.  -No CTA evidence of aneurysm or dissection.  -Hyperenhancing soft tissue structures visualized in the anterior soft tissues of the neck and base of tongue. Findings may reflect enhancing glandular tissue, ectopic thyroid or mass; this may be confirmed with nuclear medicine thyroid scan.  MRI noted: Focal acute infarcts involving the left corona radiata and right occipital lobe without evidence of hemorrhagic transformation.  RONALD ultimately negative with CT angio of TURNER noted no thrombus.   Medications prescrbied: ASA 81mg and Plavix 75mg. Statin increased to atorvastatin 80mg from Crestor 5. You will have outpatient PT; prescription provided to you.  A loop recorder was implanted and you will need to follow up with electrophysiologist on the outside. They will be in contact with you and their information is provided in this packet.      SECONDARY DISCHARGE DIAGNOSES  Diagnosis: Hypothyroid  Assessment and Plan of Treatment: Please continue on your Synthroid (thyroid medication) as prescribed. An incidental finding of a hyperenhancing soft tissue structure was noted on the CTA of your neck. It is recommedned to get a NM thyroid scan in about one month. Please follow up with your Primary doctor for this.     PRINCIPAL DISCHARGE DIAGNOSIS  Diagnosis: CVA (cerebral vascular accident)  Assessment and Plan of Treatment: You were found to have a CVA on admission. Left sided CVA. CT imaging noted:  -Focal  stenosis of the proximal left intracranial vertebral artery.  -Moderate to severe stenosis of the mid basilar artery.  -No CTA evidence of aneurysm or dissection.  -Hyperenhancing soft tissue structures visualized in the anterior soft tissues of the neck and base of tongue. Findings may reflect enhancing glandular tissue, ectopic thyroid or mass; this may be confirmed with nuclear medicine thyroid scan.  MRI noted: Focal acute infarcts involving the left corona radiata and right occipital lobe without evidence of hemorrhagic transformation.  RONALD ultimately negative with CT angio of TURNER noted no thrombus.   Medications prescrbied: ASA 81mg and Plavix 75mg. Statin increased to atorvastatin 80mg from Crestor 5. You will have outpatient PT; prescription provided to you.  A loop recorder was implanted and you will need to follow up with electrophysiologist on the outside. They will be in contact with you and their information is provided in this packet.  Please follow up with neurology. Work on controlling your diabetes, high blood pressure, cholesterol.      SECONDARY DISCHARGE DIAGNOSES  Diagnosis: Hypothyroid  Assessment and Plan of Treatment: Please continue on your Synthroid (thyroid medication) as prescribed. An incidental finding of a hyperenhancing soft tissue structure was noted on the CTA of your neck. It is recommedned to get a NM thyroid scan in about one month. Please follow up with your Primary doctor for this.     PRINCIPAL DISCHARGE DIAGNOSIS  Diagnosis: CVA (cerebral vascular accident)  Assessment and Plan of Treatment: You were found to have a CVA on admission. Left sided CVA. CT imaging noted:  -Focal  stenosis of the proximal left intracranial vertebral artery.  -Moderate to severe stenosis of the mid basilar artery.  -No CTA evidence of aneurysm or dissection.  -Hyperenhancing soft tissue structures visualized in the anterior soft tissues of the neck and base of tongue. Findings may reflect enhancing glandular tissue, ectopic thyroid or mass; this may be confirmed with nuclear medicine thyroid scan.  MRI noted: Focal acute infarcts involving the left corona radiata and right occipital lobe without evidence of hemorrhagic transformation.  RONALD ultimately negative with CT angio of TURNER noted no thrombus.   Medications prescrbied: ASA 81mg and Plavix 75mg. Statin increased to atorvastatin 80mg from Crestor 5. You will have outpatient PT; prescription provided to you.  A loop recorder was implanted and you will need to follow up with electrophysiologist on the outside. They will be in contact with you and their information is provided in this packet.  Please follow up with neurology. Work on controlling your diabetes, high blood pressure, cholesterol.      SECONDARY DISCHARGE DIAGNOSES  Diagnosis: Hypothyroid  Assessment and Plan of Treatment: Please continue on your Synthroid (thyroid medication) as prescribed. An incidental finding of a hyperenhancing soft tissue structure was noted on the CTA of your neck. It is recommedned to get a NM thyroid scan in about one month. Please follow up with your Primary doctor for this.    Diagnosis: Pulmonary nodule  Assessment and Plan of Treatment: 1cm RUL nodule seen on CT angio. Please follow up with a repeat CT scan in 6 months

## 2021-06-01 NOTE — CHART NOTE - NSCHARTNOTEFT_GEN_A_CORE
POST OPERATIVE PROCEDURAL DOCUMENTATION  PRE-OP DIAGNOSIS: CVA    POST-OP DIAGNOSIS: Dilated Aortic root, small ill-defined structure of the TURNER    PROCEDURE: Transesophageal echocardiogram    Primary Physician: Dr Guerrero  Assistant: Dr Rivas    ANESTHESIA TYPE  [  ] General Anesthesia  [ x ] Conscious Sedation  [ x ] Local/Regional    CONDITION  [  ] Critical  [  ] Serious  [x ] Fair  [  ] Good    SPECIMENS REMOVED (IF APPLICABLE): N/A    IMPLANTS (IF APPLICABLE): None    ESTIMATED BLOOD LOSS: None    COMPLICATIONS: None      FINDINGS:    After risks and benefits of procedures were explained, informed consent was obtained and placed in chart. Refer to Anesthesia note for sedation details.  The RONALD probe was passed into the esophagus without difficulty.  Transesophageal and transgastric images were obtained.  The RONALD probe was removed without difficulty and examined.  There was no evidence for bleeding.  The patient tolerated the procedure well without any immediate RONALD-related complications.      Preliminary Findings:  LA: Normal size  TURNER: Left atrial appendage was clear of clot and smoke. There was a 8 mm x 6 mm hyperechoic structure in the TURNER, most likely representing a calcified pectinate muscle, a calcified fibroelastoma or less likely a calcified thrombus.   LV: LVEF was estimated at 55-60%  MV: mild MR, no evidence of MS.   AV: No evidence of AI, no evidence of AS.   RA: Normal size  TV: mild TR.   PV: no PI.   IAS: no PFO. No R-> L shunt by Color Doppler and agitated saline injection  Aortic root is dilated at the level of Sinus of Valsalva measuring 3.9 cm  There was mild, non-mobile atheroma seen in the thoracic aorta.     DIAGNOSIS/IMPRESSION:  8 mm x 6 mm hyperechoic structure in the TURNER, most likely representing a calcified pectinate muscle, a calcified fibroelastoma or less likely a calcified thrombus, if clinical suspicion remains high then recommend CT chest w/ contrast.  Aortic root aneurysm     PLAN OF CARE:  Return to inpatient bed when stable POST OPERATIVE PROCEDURAL DOCUMENTATION  PRE-OP DIAGNOSIS: CVA    POST-OP DIAGNOSIS: Dilated Aortic root, small ill-defined structure of the TURNER    PROCEDURE: Transesophageal echocardiogram    Primary Physician: Dr Guerrero  Assistant: Dr Rivas    ANESTHESIA TYPE  [  ] General Anesthesia  [ x ] Conscious Sedation  [ x ] Local/Regional    CONDITION  [  ] Critical  [  ] Serious  [x ] Fair  [  ] Good    SPECIMENS REMOVED (IF APPLICABLE): N/A    IMPLANTS (IF APPLICABLE): None    ESTIMATED BLOOD LOSS: None    COMPLICATIONS: None      FINDINGS:    After risks and benefits of procedures were explained, informed consent was obtained and placed in chart. Refer to Anesthesia note for sedation details.  The RONALD probe was passed into the esophagus without difficulty.  Transesophageal and transgastric images were obtained.  The RONALD probe was removed without difficulty and examined.  There was no evidence for bleeding.  The patient tolerated the procedure well without any immediate RONALD-related complications.      Preliminary Findings:  LA: Normal size  TURNER: Left atrial appendage was clear of clot and smoke. There was a 8 mm x 6 mm hyperechoic structure in the TURNER, most likely representing a calcified pectinate muscle, a calcified fibroelastoma or less likely a calcified thrombus.   LV: LVEF was estimated at 55-60%  MV: mild MR, no evidence of MS.   AV: No evidence of AI, no evidence of AS.   RA: Normal size  TV: mild TR.   PV: no PI.   IAS: no PFO. No R-> L shunt by Color Doppler and agitated saline injection  Aortic root is dilated at the level of Sinus of Valsalva measuring 3.9 cm  There was mild, non-mobile atheroma seen in the thoracic aorta.     DIAGNOSIS/IMPRESSION:  8 mm x 6 mm hyperechoic structure in the TURNER, most likely representing a calcified pectinate muscle, a calcified fibroelastoma or less likely a calcified thrombus, if clinical suspicion remains high then recommend gated Cardiac CT w/ contrast TURNER protocol.  Aortic root aneurysm     PLAN OF CARE:  Return to inpatient bed when stable

## 2021-06-01 NOTE — DISCHARGE NOTE PROVIDER - CARE PROVIDER_API CALL
Jose Mirza  Cardiology  [address] [place] [address]  Phone: [phone]  Fax: [phone]  Follow Up Time:    Jose Mirza  Cardiology  [address] [place] [address]  Phone: [phone]  Fax: [phone]  Scheduled Appointment: 06/21/2021    Robert Gutierrez)  EEGEpilepsy; Neurology  [address] [facility], [address]  [place] [address]  Phone: [phone]  Fax: [phone]  Follow Up Time: 2 weeks    Lester Walters  INTERNAL MEDICINE  [address] [place] [address]  Phone: [phone]  Fax: [phone]  Follow Up Time: 1 week

## 2021-06-01 NOTE — DISCHARGE NOTE PROVIDER - PROVIDER TOKENS
PROVIDER:[TOKEN:[[id number]]] PROVIDER:[TOKEN:[[id number]],SCHEDULEDAPPT:[06/21/2021]],PROVIDER:[TOKEN:[[id number]],FOLLOWUP:[2 weeks]],PROVIDER:[TOKEN:[[id number]],FOLLOWUP:[1 week]]

## 2021-06-01 NOTE — DIETITIAN INITIAL EVALUATION ADULT. - PHYSCIAL ASSESSMENT
obese skin: intact/ no edema noted per RN flow sheets  No nutrition focused physical finding at this time

## 2021-06-01 NOTE — PROGRESS NOTE ADULT - REASON FOR ADMISSION
Facial droop

## 2021-06-01 NOTE — PROGRESS NOTE ADULT - SUBJECTIVE AND OBJECTIVE BOX
Stroke Progress Note:    LON JOE    1. Chief Complaint:    HPI:  66 F pmh DM, HTN, DLD, total thyroidectomy, hypothyroidism, unspecified heart murmur on baby ASA p/w CVA-like symptoms x 3d. 3 days ago patient states she had difficulty writing (R-handed) and her children told her her face "looked strange" and that she was speaking slowly. Additionally she was having R foot weakness described as having to "more her leg more". She made PCP appt for 5/24 and she was told to come to the ED. In the ED noted to have R hand numbness & R foot weakness. Pt denies any falls or head trauma. No ha, vision changes, neck pain, cp/sob, nv, abd pain. Hypertensive in ED to 220s/120s.   CTH showing more prominent microvascular changes since prior exam (1/16/19) but no evidence of territorial infarct. CTA H/N showing focal severe stenosis of proximal L intracranial vertebral artery, moderate stenosis of mild basilar artery, no aneurysm/dissection.    (24 May 2021 22:15)      2. Relevant PMH:   Prior ischemic stroke/TIA[ ], Afib [ ], CAD [ ], HTN [x ], DLD [x ], DM [x ], PVD [ ], Obesity [ ],   Sedentary lifestyle [ ], CHF [ ], ABDIAS [ ], Cancer Hx [ ].    3. Social History: Smoking [ ], Drug Use [ ], Alcohol Use [ ], Other [ ]    4. Possible Location of Stroke: pending     5. Relevant Brain Tissue Imaging: < from: CT Head No Cont (05.24.21 @ 21:07) >    Impression:      More prominent microvascular changes since the prior exam.    No evidence of territorial infarct.    < end of copied text >  < from: MR Head No Cont (05.25.21 @ 21:32) >    IMPRESSION:  Focal acute infarcts involving the left corona radiata and right occipital lobe without evidence of hemorrhagic transformation.    Moderate chronic microvascular changes.    < end of copied text >      6. Relevant Cerebrovascular Imaging: < from: CT Angio Neck w/ IV Cont (05.24.21 @ 22:09) >  IMPRESSION:  -Focal severe stenosis of the proximal left intracranial vertebral artery.  -Moderate to severe stenosis of the mid basilar artery.  -No CTA evidence of aneurysm or dissection.  -Hyperenhancing soft tissue structures visualized in the anterior soft tissues of the neck and base of tongue. Findings may reflect enhancing glandular tissue, ectopic thyroid or mass; this may be confirmed with nuclear medicine thyroid scan.           7. Relevant blood tests:  LDL Cholesterol Calculated: 103 mg/dL (05.25.21 @ 11:24)  A1C with Estimated Average Glucose (05.25.21 @ 11:24)    A1C with Estimated Average Glucose Result: 8.2: Method: Immunoassay       Reference Range                4.0-5.6%       High risk (prediabetic)        5.7-6.4%       Diabetic, diagnostic             >=6.5%       ADA diabetic treatment goal       <7.0%  The Hemoglobin A1c testing is NGSP-certified.Reference ranges are based  upon the 2010 recommendations of  the American Diabetes Association.  Interpretation may vary for children  and adolescents. %    Estimated Average Glucose: 189: The Estimated Average Glucose (eAG) or Mean Plasma Glucose (MPG) value is  calculated from the hemoglobin A1c value and covers the same time period.   The American Diabetes Association (ADA) and other professional  organizations recommend reporting the eAG with the HgbA1c. mg/dL          8. Relevant cardiac rhythm monitoring: < from: 12 Lead ECG (05.24.21 @ 17:46) >    Atrial Rate 69 BPM    P-R Interval 200 ms    QRS Duration 106 ms    Q-T Interval 424 ms    QTC Calculation(Bazett) 454 ms    P Axis 79 degrees    R Axis 259 degrees    T Axis 84 degrees    Diagnosis Line Normal sinus rhythm  Right superior axis deviation  Incomplete right bundle branch block  Possible Anterior infarct , age undetermined  Abnormal ECG    < end of copied text >      9. Relevant Cardiac Structure: (TTE/RONALD +/-):[ ]No intracardiac thrombus/[ ] no vegetation/[ ]no ak[place]ia/EF: DIAGNOSIS/IMPRESSION:  8 mm x 6 mm hyperechoic structure in the TURNER, most likely representing a calcified pectinate muscle, a calcified fibroelastoma or less likely a calcified thrombus, if clinical suspicion remains high then recommend gated Cardiac CT w/ contrast TURNER protocol.  Aortic root aneurysm       Home Medications:  aspirin 81 mg oral tablet: 1 tab(s) orally once a day (01 Jun 2021 13:40)  folic acid 1 mg oral tablet: 1 tab(s) orally once a day (01 Jun 2021 13:40)  levothyroxine 137 mcg (0.137 mg) oral tablet: 1 tab(s) orally once a day (01 Jun 2021 13:40)  losartan-hydrochlorothiazide 100 mg-25 mg oral tablet: 1 tab(s) orally once a day (01 Jun 2021 13:40)  metFORMIN 1000 mg oral tablet: 1 tab(s) orally 2 times a day (01 Jun 2021 13:40)      MEDICATIONS  (STANDING):  amLODIPine   Tablet 10 milliGRAM(s) Oral daily  aspirin  chewable 81 milliGRAM(s) Oral daily  atorvastatin 80 milliGRAM(s) Oral at bedtime  clopidogrel Tablet 75 milliGRAM(s) Oral daily  dextrose 40% Gel 15 Gram(s) Oral once  dextrose 5%. 1000 milliLiter(s) (50 mL/Hr) IV Continuous <Continuous>  dextrose 5%. 1000 milliLiter(s) (100 mL/Hr) IV Continuous <Continuous>  dextrose 50% Injectable 25 Gram(s) IV Push once  dextrose 50% Injectable 12.5 Gram(s) IV Push once  dextrose 50% Injectable 25 Gram(s) IV Push once  glucagon  Injectable 1 milliGRAM(s) IntraMuscular once  heparin   Injectable 5000 Unit(s) SubCutaneous every 12 hours  hydrochlorothiazide 25 milliGRAM(s) Oral daily  insulin glargine Injectable (LANTUS) 15 Unit(s) SubCutaneous at bedtime  insulin lispro (ADMELOG) corrective regimen sliding scale   SubCutaneous three times a day before meals  insulin lispro Injectable (ADMELOG) 5 Unit(s) SubCutaneous three times a day before meals  levothyroxine 137 MICROGram(s) Oral daily  losartan 100 milliGRAM(s) Oral daily  pantoprazole    Tablet 40 milliGRAM(s) Oral before breakfast  senna 2 Tablet(s) Oral at bedtime      10. PT/OT/Speech/Rehab/S&Sw/ Cognitive eval results and recommendations:    11. Exam: Examination:  General:  Appearance is consistent with chronologic age.  No abnormal facies.  Gross skin survey within normal limits.    Cognitive/Language:  The patient is oriented to person, place, time and date.  Recent and remote memory intact.  Fund of knowledge is intact and normal.  Language with normal repetition, comprehension and naming.  Nondysarthric.    Eyes: intact VA, VFF.  EOMI w/o nystagmus, skew or reported double vision.  PERRL.  No ptosis/weakness of eyelid closure.    Face:  Facial sensation normal V1 - 3, no facial asymmetry.    Ears/Nose/Throat:  Hearing grossly intact b/l.  Palate elevates midline.  Tongue and uvula midline.   Motor examination:   Normal tone, bulk and range of motion.  No tenderness, twitching, tremors or involuntary movements.  Formal Muscle Strength Testing: (MRC grade R/L) 5/5 UE; 5/5 LE.  No observable drift.  Reflexes:   2+ b/l pectoralis, biceps, triceps, brachioradialis, patella and Achilles.  Plantar response downgoing b/l.  Jaw jerk, Deena, clonus absent.  Sensory examination:   Intact to light touch and pinprick, pain, temperature and proprioception and vibration in all extremities.  Cerebellum:   FTN/HKS intact with normal MARCELLUS in all limbs.  No dysmetria or dysdiadokinesia.  Gait narrow based and normal.    Vital Signs Last 24 Hrs  T(C): 35.7 (01 Jun 2021 13:01), Max: 36.2 (01 Jun 2021 05:18)  T(F): 96.3 (01 Jun 2021 13:01), Max: 97.2 (01 Jun 2021 05:18)  HR: 59 (01 Jun 2021 13:01) (59 - 68)  BP: 161/78 (01 Jun 2021 13:01) (116/66 - 161/78)  RR: 18 (01 Jun 2021 13:01) (18 - 18)      12. exam    pt alert, laying in bed   delayed speech   no drift observed     NIH STROKE SCALE  Item	                                                        Score  1 a.	Level of Consciousness	               	0  1 b. LOC Questions	                                0  1 c.	LOC Commands	                               	0  2.	Best Gaze	                                        0  3.	Visual	                                                0  4.	Facial Palsy	                                        0  5 a.	Motor Arm - Left	                                0  5 b.	Motor Arm - Right	                        0  6 a.	Motor Leg - Left	                                0  6 b.	Motor Leg - Right	                                0  7.	Limb Ataxia	                                        0  8.	Sensory	                                                0  9.	Language	                                        0  10.	Dysarthria	                                        1  11.	Extinction and Inattention  	        0  ______________________________________  TOTAL	                                                        0    Total NIHSS on admission:      NIHSS yesterday:      NIHSS today: 1    mRS: 3  0 No symptoms at all  1 No significant disability despite symptoms; able to carry out all usual duties and activities without assistance  2 Slight disability; unable to carry out all previous activities, but able to look after own affairs  3 Moderate disability; requiring some help, but able to walk without assistance  4 Moderately severe disability; unable to walk without assistance and unable to attend to own bodily needs without assistance  5 Severe disability; bedridden, incontinent and requiring constant nursing care and attention  6 Dead         Stroke Progress Note:    LON JOE    1. Chief Complaint:    HPI:  66 F pmh DM, HTN, DLD, total thyroidectomy, hypothyroidism, unspecified heart murmur on baby ASA p/w CVA-like symptoms x 3d. 3 days ago patient states she had difficulty writing (R-handed) and her children told her her face "looked strange" and that she was speaking slowly. Additionally she was having R foot weakness described as having to "move her leg more". She made PCP appt for 5/24 and she was told to come to the ED. In the ED noted to have R hand numbness & R foot weakness. Pt denies any falls or head trauma. No ha, vision changes, neck pain, cp/sob, nv, abd pain. Hypertensive in ED to 220s/120s.   CTH showing more prominent microvascular changes since prior exam (1/16/19) but no evidence of territorial infarct. CTA H/N showing focal severe stenosis of proximal L intracranial vertebral artery, moderate stenosis of mild basilar artery, no aneurysm/dissection.    (24 May 2021 22:15)      2. Relevant PMH:   Prior ischemic stroke/TIA[ ], Afib [ ], CAD [ ], HTN [x ], DLD [x ], DM [x ], PVD [ ], Obesity [ ],   Sedentary lifestyle [ ], CHF [ ], ABDIAS [ ], Cancer Hx [ ].    3. Social History: Smoking [ ], Drug Use [ ], Alcohol Use [ ], Other [ ]    4. Possible Location of Stroke: pending     5. Relevant Brain Tissue Imaging: < from: CT Head No Cont (05.24.21 @ 21:07) >    Impression:      More prominent microvascular changes since the prior exam.    No evidence of territorial infarct.    < end of copied text >  < from: MR Head No Cont (05.25.21 @ 21:32) >    IMPRESSION:  Focal acute infarcts involving the left corona radiata and right occipital lobe without evidence of hemorrhagic transformation.    Moderate chronic microvascular changes.    < end of copied text >      6. Relevant Cerebrovascular Imaging: < from: CT Angio Neck w/ IV Cont (05.24.21 @ 22:09) >  IMPRESSION:  -Focal severe stenosis of the proximal left intracranial vertebral artery.  -Moderate to severe stenosis of the mid basilar artery.  -No CTA evidence of aneurysm or dissection.  -Hyperenhancing soft tissue structures visualized in the anterior soft tissues of the neck and base of tongue. Findings may reflect enhancing glandular tissue, ectopic thyroid or mass; this may be confirmed with nuclear medicine thyroid scan.           7. Relevant blood tests:  LDL Cholesterol Calculated: 103 mg/dL (05.25.21 @ 11:24)  A1C with Estimated Average Glucose (05.25.21 @ 11:24)    A1C with Estimated Average Glucose Result: 8.2: Method: Immunoassay       Reference Range                4.0-5.6%       High risk (prediabetic)        5.7-6.4%       Diabetic, diagnostic             >=6.5%       ADA diabetic treatment goal       <7.0%  The Hemoglobin A1c testing is NGSP-certified.Reference ranges are based  upon the 2010 recommendations of  the American Diabetes Association.  Interpretation may vary for children  and adolescents. %    Estimated Average Glucose: 189: The Estimated Average Glucose (eAG) or Mean Plasma Glucose (MPG) value is  calculated from the hemoglobin A1c value and covers the same time period.   The American Diabetes Association (ADA) and other professional  organizations recommend reporting the eAG with the HgbA1c. mg/dL          8. Relevant cardiac rhythm monitoring: < from: 12 Lead ECG (05.24.21 @ 17:46) >    Atrial Rate 69 BPM    P-R Interval 200 ms    QRS Duration 106 ms    Q-T Interval 424 ms    QTC Calculation(Bazett) 454 ms    P Axis 79 degrees    R Axis 259 degrees    T Axis 84 degrees    Diagnosis Line Normal sinus rhythm  Right superior axis deviation  Incomplete right bundle branch block  Possible Anterior infarct , age undetermined  Abnormal ECG    < end of copied text >      9. Relevant Cardiac Structure: (TTE/RONALD +/-):[ ]No intracardiac thrombus/[ ] no vegetation/[ ]no [place]ia/EF: DIAGNOSIS/IMPRESSION:  8 mm x 6 mm hyperechoic structure in the TURNER, most likely representing a calcified pectinate muscle, a calcified fibroelastoma or less likely a calcified thrombus, if clinical suspicion remains high then recommend gated Cardiac CT w/ contrast TURNER protocol.  Aortic root aneurysm       Home Medications:  aspirin 81 mg oral tablet: 1 tab(s) orally once a day (01 Jun 2021 13:40)  folic acid 1 mg oral tablet: 1 tab(s) orally once a day (01 Jun 2021 13:40)  levothyroxine 137 mcg (0.137 mg) oral tablet: 1 tab(s) orally once a day (01 Jun 2021 13:40)  losartan-hydrochlorothiazide 100 mg-25 mg oral tablet: 1 tab(s) orally once a day (01 Jun 2021 13:40)  metFORMIN 1000 mg oral tablet: 1 tab(s) orally 2 times a day (01 Jun 2021 13:40)      MEDICATIONS  (STANDING):  amLODIPine   Tablet 10 milliGRAM(s) Oral daily  aspirin  chewable 81 milliGRAM(s) Oral daily  atorvastatin 80 milliGRAM(s) Oral at bedtime  clopidogrel Tablet 75 milliGRAM(s) Oral daily  dextrose 40% Gel 15 Gram(s) Oral once  dextrose 5%. 1000 milliLiter(s) (50 mL/Hr) IV Continuous <Continuous>  dextrose 5%. 1000 milliLiter(s) (100 mL/Hr) IV Continuous <Continuous>  dextrose 50% Injectable 25 Gram(s) IV Push once  dextrose 50% Injectable 12.5 Gram(s) IV Push once  dextrose 50% Injectable 25 Gram(s) IV Push once  glucagon  Injectable 1 milliGRAM(s) IntraMuscular once  heparin   Injectable 5000 Unit(s) SubCutaneous every 12 hours  hydrochlorothiazide 25 milliGRAM(s) Oral daily  insulin glargine Injectable (LANTUS) 15 Unit(s) SubCutaneous at bedtime  insulin lispro (ADMELOG) corrective regimen sliding scale   SubCutaneous three times a day before meals  insulin lispro Injectable (ADMELOG) 5 Unit(s) SubCutaneous three times a day before meals  levothyroxine 137 MICROGram(s) Oral daily  losartan 100 milliGRAM(s) Oral daily  pantoprazole    Tablet 40 milliGRAM(s) Oral before breakfast  senna 2 Tablet(s) Oral at bedtime      10. PT/OT/Speech/Rehab/S&Sw/ Cognitive eval results and recommendations:    11. Exam: Examination:  General:  Appearance is consistent with chronologic age.  No abnormal facies.  Gross skin survey within normal limits.    Cognitive/Language:  The patient is oriented to person, place, time and date.  Recent and remote memory intact.  Fund of knowledge is intact and normal.  Language with normal repetition, comprehension and naming.  Nondysarthric.    Eyes: intact VA, VFF.  EOMI w/o nystagmus, skew or reported double vision.  PERRL.  No ptosis/weakness of eyelid closure.    Face:  Facial sensation normal V1 - 3, no facial asymmetry.    Ears/Nose/Throat:  Hearing grossly intact b/l.  Palate elevates midline.  Tongue and uvula midline.   Motor examination:   Normal tone, bulk and range of motion.  No tenderness, twitching, tremors or involuntary movements.  Formal Muscle Strength Testing: (MRC grade R/L) 5/5 UE; 5/5 LE.  No observable drift.  Reflexes:   2+ b/l pectoralis, biceps, triceps, brachioradialis, patella and Achilles.  Plantar response downgoing b/l.  Jaw jerk, Deena, clonus absent.  Sensory examination:   Intact to light touch and pinprick, pain, temperature and proprioception and vibration in all extremities.  Cerebellum:   FTN/HKS intact with normal MARCELULS in all limbs.  No dysmetria or dysdiadokinesia.  Gait narrow based and normal.    Vital Signs Last 24 Hrs  T(C): 35.7 (01 Jun 2021 13:01), Max: 36.2 (01 Jun 2021 05:18)  T(F): 96.3 (01 Jun 2021 13:01), Max: 97.2 (01 Jun 2021 05:18)  HR: 59 (01 Jun 2021 13:01) (59 - 68)  BP: 161/78 (01 Jun 2021 13:01) (116/66 - 161/78)  RR: 18 (01 Jun 2021 13:01) (18 - 18)      12. exam    pt alert, laying in bed   delayed speech   no drift observed     NIH STROKE SCALE  Item	                                                        Score  1 a.	Level of Consciousness	               	0  1 b. LOC Questions	                                0  1 c.	LOC Commands	                               	0  2.	Best Gaze	                                        0  3.	Visual	                                                0  4.	Facial Palsy	                                        0  5 a.	Motor Arm - Left	                                0  5 b.	Motor Arm - Right	                        0  6 a.	Motor Leg - Left	                                0  6 b.	Motor Leg - Right	                                0  7.	Limb Ataxia	                                        0  8.	Sensory	                                                0  9.	Language	                                        0  10.	Dysarthria	                                        1  11.	Extinction and Inattention  	        0  ______________________________________  TOTAL	                                                        0    Total NIHSS on admission:      NIHSS yesterday:      NIHSS today: 1    mRS: 3  0 No symptoms at all  1 No significant disability despite symptoms; able to carry out all usual duties and activities without assistance  2 Slight disability; unable to carry out all previous activities, but able to look after own affairs  3 Moderate disability; requiring some help, but able to walk without assistance  4 Moderately severe disability; unable to walk without assistance and unable to attend to own bodily needs without assistance  5 Severe disability; bedridden, incontinent and requiring constant nursing care and attention  6 Dead

## 2021-06-01 NOTE — DIETITIAN INITIAL EVALUATION ADULT. - NAME AND PHONE
RD to monitor: diet order, body composition, energy intake, nutrition focused physical finding,  glucose profile.  not at risk will f/u in 7 days. Discussed with LIP I

## 2021-06-01 NOTE — DIETITIAN INITIAL EVALUATION ADULT. - OTHER INFO
Pertinent medical information: 66 year old female pmh DM, HTN, DLD, total thyroidectomy, hypothyroidism, unspecified heart murmur on baby ASA p/w CVA-like symptoms. Hypertensive in ED to 220s/120s. Patient noted with L hemifacial droop and R sided focal deficits 2/2 acute CVA. Neurology following.    Pertinent nutrition information: Patient reports that her appetite prior to coming to the hospital was poor, because she was not sure what diet to follow. Does not follow any specific diet/ restrictions. Denies use of oral nutrition supplements. Takes centrum Multivitamin, but not on a daily basis . Confirms NKFA. No Restoration or cultural food preferences.     Provided pt with education and answered all nutrition related questions, detailed education below. Patients PO intake % of meals, no factors affecting appetite. No N/V/D at this time, LBM: 5/31 per pt  however reprots constipation, noted on bowel regimen. Per SLP: Dysphagia Diet II mechanical soft consistency with ground meat, thins. Patient reports that whenever she drinks liquids " she feels as if its going down the wrong way and chokes".     Patient states UBW ~185 lbs. Current dosing weight consistent with stated UBW. No unintentional weight loss reported

## 2021-06-01 NOTE — DIETITIAN INITIAL EVALUATION ADULT. - CONTINUE CURRENT NUTRITION CARE PLAN
goal: Patient to continue to coinsume ~% of meals in the next 7 days. Intervention: meals and snacks, coordination of care

## 2021-06-01 NOTE — DISCHARGE NOTE PROVIDER - PROVIDER RX CONTACT NUMBER
Problem: Adult Inpatient Plan of Care  Goal: Plan of Care Review  Plan of care reviewed with pt, pt verbalized understanding. IV site clean, dry, intact, no redness or swelling noted. Pt ambulates with stand by assist, remains free of fall/trauma. Pt reports no bloody emesis or BM this shift. Pain controlled with current regimen. VSS, pt remained afebrile this shift. Cardiac monitoring in progress. Safety maintained throughout shift, call light in reach, bed locked and in lowest position, side rails up x2. Will continue to monitor, observe and report any changes.        [phone]

## 2021-06-01 NOTE — DIETITIAN INITIAL EVALUATION ADULT. - ADD RECOMMEND
1) Continue diet order per SLP recommendations 2) Consider consulting SLP for reevaluation as pt reports choking when drinking liquids

## 2021-06-01 NOTE — DIETITIAN INITIAL EVALUATION ADULT. - PERTINENT MEDS FT
MEDICATIONS  (STANDING):  amLODIPine   Tablet 10 milliGRAM(s) Oral daily  atorvastatin 80 milliGRAM(s) Oral at bedtime  dextrose 40% Gel 15 Gram(s) Oral once  dextrose 5%. 1000 milliLiter(s) (50 mL/Hr) IV Continuous <Continuous>  dextrose 5%. 1000 milliLiter(s) (100 mL/Hr) IV Continuous <Continuous>  dextrose 50% Injectable 25 Gram(s) IV Push once  dextrose 50% Injectable 12.5 Gram(s) IV Push once  dextrose 50% Injectable 25 Gram(s) IV Push once  glucagon  Injectable 1 milliGRAM(s) IntraMuscular once  hydrochlorothiazide 25 milliGRAM(s) Oral daily  insulin glargine Injectable (LANTUS) 15 Unit(s) SubCutaneous at bedtime  insulin lispro (ADMELOG) corrective regimen sliding scale   SubCutaneous three times a day before meals  insulin lispro Injectable (ADMELOG) 5 Unit(s) SubCutaneous three times a day before meals  levothyroxine 137 MICROGram(s) Oral daily  losartan 100 milliGRAM(s) Oral daily  pantoprazole    Tablet 40 milliGRAM(s) Oral before breakfast  senna 2 Tablet(s) Oral at bedtime    MEDICATIONS  (PRN):  lactulose Syrup 10 Gram(s) Oral two times a day PRN constipation

## 2021-06-01 NOTE — CHART NOTE - NSCHARTNOTEFT_GEN_A_CORE
PACU ANESTHESIA ADMISSION NOTE      Procedure: RONALD  Post op diagnosis:      ____  Intubated  TV:______       Rate: ______      FiO2: ______    __x_  Patent Airway    __x__  Full return of protective reflexes    ___x_  Full recovery from anesthesia / back to baseline status    Vitals:  /66  HR 56  RR 20  O2 Sat    Mental Status:  ____x Awake   _____ Alert   _____ Drowsy   _____ Sedated    Nausea/Vomiting:  ____ xNO  ______Yes,   See Post - Op Orders          Pain Scale (0-10):  ___x_    Treatment: ____ None    ____ See Post - Op/PCA Orders    Post - Operative Fluids:   __x__ Oral   ____ See Post - Op Orders    Plan: Discharge:_Home       __x___Floor     _____Critical Care    _____  Other:_________________    Comments:

## 2021-06-01 NOTE — DISCHARGE NOTE PROVIDER - NSDCQMSTROKERISK_NEU_ALL_CORE
History of a stroke or TIA Carotid stenosis/Diabetes/High blood pressure/High cholesterol/History of a stroke or TIA/Obesity

## 2021-06-01 NOTE — PROGRESS NOTE ADULT - SUBJECTIVE AND OBJECTIVE BOX
Patient is a 66y old  Female who presents with a chief complaint of Facial droop (28 May 2021 14:54)    INTERVAL HPI/OVERNIGHT EVENTS: Patient was examined and seen at bedside. This morning pt is resting comfortably in bed and reports no new issues or overnight events. No complaints, feels better  ROS: Denies CP, SOB, AP, new weakness  All other systems reviewed and are within normal limits.  InitialHPI:  66 F pmh DM, HTN, DLD, total thyroidectomy, hypothyroidism, unspecified heart murmur on baby ASA p/w CVA-like symptoms x 3d. 3 days ago patient states she had difficulty writing (R-handed) and her children told her her face "looked strange" and that she was speaking slowly. Additionally she was having R foot weakness described as having to "more her leg more". She made PCP appt for 5/24 and she was told to come to the ED. In the ED noted to have R hand numbness & R foot weakness. Pt denies any falls or head trauma. No ha, vision changes, neck pain, cp/sob, nv, abd pain. Hypertensive in ED to 220s/120s.   CTH showing more prominent microvascular changes since prior exam (1/16/19) but no evidence of territorial infarct. CTA H/N showing focal severe stenosis of proximal L intracranial vertebral artery, moderate stenosis of mild basilar artery, no aneurysm/dissection.    (24 May 2021 22:15)    PAST MEDICAL & SURGICAL HISTORY:  Diabetes    Hypertension        General: NAD, AAO3  HEENT:  EOMI, no LAD  CV: S1 S2  Resp: decreased breath sounds at bases  GI: NT/ND/S +BS  MS: no clubbing/cyanosis/edema, + pulses b/l  Neuro: Rt 5-/5, +reflexes thruout            MEDICATIONS  (STANDING):  amLODIPine   Tablet 10 milliGRAM(s) Oral daily  aspirin  chewable 81 milliGRAM(s) Oral daily  atorvastatin 80 milliGRAM(s) Oral at bedtime  clopidogrel Tablet 75 milliGRAM(s) Oral daily  dextrose 40% Gel 15 Gram(s) Oral once  dextrose 5%. 1000 milliLiter(s) (50 mL/Hr) IV Continuous <Continuous>  dextrose 5%. 1000 milliLiter(s) (100 mL/Hr) IV Continuous <Continuous>  dextrose 50% Injectable 25 Gram(s) IV Push once  dextrose 50% Injectable 12.5 Gram(s) IV Push once  dextrose 50% Injectable 25 Gram(s) IV Push once  glucagon  Injectable 1 milliGRAM(s) IntraMuscular once  heparin   Injectable 5000 Unit(s) SubCutaneous every 12 hours  hydrochlorothiazide 25 milliGRAM(s) Oral daily  insulin glargine Injectable (LANTUS) 15 Unit(s) SubCutaneous at bedtime  insulin lispro (ADMELOG) corrective regimen sliding scale   SubCutaneous three times a day before meals  insulin lispro Injectable (ADMELOG) 5 Unit(s) SubCutaneous three times a day before meals  levothyroxine 137 MICROGram(s) Oral daily  losartan 100 milliGRAM(s) Oral daily  pantoprazole    Tablet 40 milliGRAM(s) Oral before breakfast  senna 2 Tablet(s) Oral at bedtime    MEDICATIONS  (PRN):  lactulose Syrup 10 Gram(s) Oral two times a day PRN constipation    Home Medications:  amLODIPine 10 mg oral tablet: 1 tab(s) orally once a day (01 Jun 2021 15:21)  aspirin 81 mg oral tablet: 1 tab(s) orally once a day (01 Jun 2021 13:40)  folic acid 1 mg oral tablet: 1 tab(s) orally once a day (01 Jun 2021 13:40)  levothyroxine 137 mcg (0.137 mg) oral tablet: 1 tab(s) orally once a day (01 Jun 2021 13:40)  losartan-hydrochlorothiazide 100 mg-25 mg oral tablet: 1 tab(s) orally once a day (01 Jun 2021 13:40)  metFORMIN 1000 mg oral tablet: 1 tab(s) orally 2 times a day (01 Jun 2021 13:40)    Vital Signs Last 24 Hrs  T(C): 35.7 (01 Jun 2021 13:01), Max: 36.2 (01 Jun 2021 05:18)  T(F): 96.3 (01 Jun 2021 13:01), Max: 97.2 (01 Jun 2021 05:18)  HR: 59 (01 Jun 2021 13:01) (59 - 68)  BP: 161/78 (01 Jun 2021 13:01) (116/66 - 161/78)  BP(mean): --  RR: 18 (01 Jun 2021 13:01) (18 - 18)  SpO2: --  CAPILLARY BLOOD GLUCOSE      POCT Blood Glucose.: 114 mg/dL (01 Jun 2021 12:22)  POCT Blood Glucose.: 160 mg/dL (01 Jun 2021 07:10)  POCT Blood Glucose.: 150 mg/dL (31 May 2021 21:02)  POCT Blood Glucose.: 131 mg/dL (31 May 2021 16:26)    LABS:                        12.0   4.59  )-----------( 181      ( 31 May 2021 05:33 )             37.6     06-01    141  |  103  |  18  ----------------------------<  168<H>  3.8   |  28  |  1.1    Ca    9.0      01 Jun 2021 06:56  Mg     2.2     06-01                        Consultant Notes Reviewed:  [x ] YES  [ ] NO  Care Discussed with Consultants/Other Providers/ Housestaff [ x] YES  [ ] NO  Radiology, labs, new studies personally reviewed.                                                Assessment and Plan:   This is a 66 year old female pmh DM, HTN, DLD, total thyroidectomy, hypothyroidism, unspecified heart murmur on baby ASA p/w CVA-like symptoms. Hypertensive in ED to 220s/120s.   CTH showing more prominent microvascular changes since prior exam (1/16/19) but no evidence of territorial infarct. CTA H/N showing focal severe stenosis of proximal L intracranial vertebral artery, moderate stenosis of mild basilar artery, no aneurysm/dissection.     #L hemifacial droop and R sided focal deficits 2/2 acute CVA   - Neurology following  - CTH: no territorial infarct but more prominent microvascular changes compared to prior exam on 5/24/19  - CTA H/N: Focal severe stenosis of proximal L intracranial vertebral artery, mod-sev stenosis of mid basilar artery  - MRI acute ischemic strokes in L CR and R occipital stroke   - Noted heart murmur history  - Continue on ASA 81mg, atorvastatin 80 mg, and plavix 75mg qd  - A1c 8.2  - lipid panel , ,   - PT/OT/physiatry eval  -s/p ILR, RONALD w/ questionable LLA lesion, going for CTA heart    #HTN   - continue on amlodipine 5 mg     #DM  - a1c 8.2   - Monitor finger sticks   - Continue on Basal/bolus insulin  - continue on Lantus 15 Lispro 5/5/5    #DLD  - Continue on atorvastatin 80 mg     #Hypothyroidism  #Hx of complete thyroidectomy  #Hyperenhancing soft tissue structures visualized in the anterior soft tissues of the neck and base of tongue.   - CT angio of neck: Hyperenhancing soft tissue structures visualized in the anterior soft tissues of the neck and base of tongue. Findings may reflect enhancing glandular tissue, ectopic thyroid or mass; this may be confirmed with nuclear medicine thyroid scan  - Thyroid scan in 4wks as an outpatient due to iodine contrast load this admission (pt/family aware)  - Continue on Levothyroxine     Activity: As tolerated  Diet: DASH/Carb  DVT ppx: Heparin   GI ppx: Protonix  Code Status: Full Code  Dispo: Acute    Dispo: home if CTA heart negative    My note supersedes the residents note should a discrepancy arise.    Chart and consultant notes personally reviewed.  Care Discussed with Consultants/Other Providers/ Housestaff [ x] YES [ ] NO   Radiology, labs, old records personally reviewed.    d/w Housestaff, nursing, case mgmt, neuro Dr. Kelly    Discharge instructions discussed and patient knows when to seek immediate medical attention.  Patient has proper follow up.  All results discussed and patient aware they require further follow up/work up.  Stressed importance of proper follow up.  Medications prescribed and changes discussed.  All questions and concerns from patient and family addressed. Understanding of instructions verbalized.    Time spent in completing discharge process and coordinating care 45 minutes.

## 2021-06-01 NOTE — DISCHARGE NOTE PROVIDER - HOSPITAL COURSE
66 F pmh DM, HTN, DLD, total thyroidectomy, hypothyroidism, unspecified heart murmur on baby ASA p/w CVA-like symptoms x 3d. 3 days ago patient states she had difficulty writing (R-handed) and her children told her her face "looked strange" and that she was speaking slowly. Additionally she was having R foot weakness described as having to "more her leg more". She made PCP appt for 5/24 and she was told to come to the ED. In the ED noted to have R hand numbness & R foot weakness. Pt denies any falls or head trauma. No ha, vision changes, neck pain, cp/sob, nv, abd pain. Hypertensive in ED to 220s/120s.   CTH showing more prominent microvascular changes since prior exam (1/16/19) but no evidence of territorial infarct. CTA H/N showing focal severe stenosis of proximal L intracranial vertebral artery, moderate stenosis of mild basilar artery, no aneurysm/dissection. MRI showed: Focal acute infarcts involving the left corona radiata and right occipital lobe without evidence of hemorrhagic transformation.    Loop recorder was implanted and RONALD noted for a hypoechoic mass which did not show up on a CT angio TURNER. Medically stable for discharge on ASA/Plavix/Statin and to follow up with EPS for loop recorder interrogation.

## 2021-06-01 NOTE — DIETITIAN INITIAL EVALUATION ADULT. - PERSON TAUGHT/METHOD
Discussed with pt different  seasoning alternatives other than salt, and discussed different foods that are high in sodium, pt had questions on diabetets n utrition management, all questions were answered and provided pt with outpatient dietitian contact number/verbal instruction/patient instructed

## 2021-06-01 NOTE — PROGRESS NOTE ADULT - ATTENDING COMMENTS
Pt c/o choking on saliva and food.  Loop recorder placed 2/28.  RONALD on Monday or Tuesday, COVID ordered.  Will call SLP veronica.   Pt is able to ambulate.     #Progress Note Handoff  Pending RONALD early next week.
s/p ILR implant
Patient with acute stroke, now implanted loop recorder.  RONALD showed ? calcification vs thrombus (less likely) in atrium.  Cardiac CT suggested.  Continue dual antiplatelet tx and f/u on CT result.
acute stroke - awaiting MRI.   Will need possible embolic workup
bilateral stroke acute. needs embolic work up  eric, possible loop

## 2021-06-01 NOTE — PROGRESS NOTE ADULT - PROVIDER SPECIALTY LIST ADULT
Internal Medicine
Physiatry
Electrophysiology
Hospitalist
Internal Medicine
Neurology
Neurology
Hospitalist
Internal Medicine
Internal Medicine

## 2021-06-01 NOTE — PROGRESS NOTE ADULT - NSICDXPILOT_GEN_ALL_CORE
[place]

## 2021-06-01 NOTE — PROGRESS NOTE ADULT - ASSESSMENT
13. Impression: 66F with Hx of DM, HTN, HLD, on ASA 81 presents to ED for R sided weakness and facial droop noted by PCP today when she went for a follow up appointment. PCP sent her into ED for evaluation for new stroke. MRI brain with acute ischemic strokes in L CR and R occipital stroke s/o cardioembolic etiology. 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      suggestion:  continue asa, plavix, statin  f/u cardiology   f/u cardiac CT   medical management per primary team

## 2021-06-01 NOTE — DISCHARGE NOTE PROVIDER - NSDCMRMEDTOKEN_GEN_ALL_CORE_FT
amLODIPine 10 mg oral tablet: 1 tab(s) orally once a day  aspirin 81 mg oral tablet: 1 tab(s) orally once a day  atorvastatin 80 mg oral tablet: 1 tab(s) orally once a day (at bedtime)  clopidogrel 75 mg oral tablet: 1 tab(s) orally once a day  folic acid 1 mg oral tablet: 1 tab(s) orally once a day  levothyroxine 137 mcg (0.137 mg) oral tablet: 1 tab(s) orally once a day  losartan-hydrochlorothiazide 100 mg-25 mg oral tablet: 1 tab(s) orally once a day  metFORMIN 1000 mg oral tablet: 1 tab(s) orally 2 times a day  senna oral tablet: 2 tab(s) orally once a day (at bedtime)

## 2021-06-01 NOTE — DIETITIAN INITIAL EVALUATION ADULT. - OTHER CALCULATIONS
Weight used: 54.5 kg -- IBW consider BMI >30 Energy needs: aim towards higher end -- consider the difference between ABW and IBW

## 2021-06-01 NOTE — DIETITIAN INITIAL EVALUATION ADULT. - PERTINENT LABORATORY DATA
6/1:  Glucose: 168, eGFR: 52  5/25: A1c: 8.2    CAPILLARY BLOOD GLUCOSE  POCT Blood Glucose.: 160 mg/dL (01 Jun 2021 07:10)  POCT Blood Glucose.: 150 mg/dL (31 May 2021 21:02)  POCT Blood Glucose.: 131 mg/dL (31 May 2021 16:26)  POCT Blood Glucose.: 78 mg/dL (31 May 2021 11:22)

## 2021-06-01 NOTE — DISCHARGE NOTE PROVIDER - CARE PROVIDERS DIRECT ADDRESSES
[email or web address]

## 2021-06-01 NOTE — PROGRESS NOTE ADULT - SUBJECTIVE AND OBJECTIVE BOX
Hospital Day:  8d    Patient is a 66y old  Female who presents with a chief complaint of 8d    Subjective:  No overnight events. Seen and examined at bedside this morning. Advised of plan for RONALD prior to discharge. Remainder of review of systems otherwise negative. As per patient request will have Registered Dietician speak with patient prior to discharge    Past Medical Hx:   Diabetes  Hypertension    Past Sx:  Thyroidectomy     Allergies:  No Known Allergies    Current Meds:   Standng Meds:  amLODIPine   Tablet 10 milliGRAM(s) Oral daily  aspirin  chewable 81 milliGRAM(s) Oral daily  atorvastatin 80 milliGRAM(s) Oral at bedtime  clopidogrel Tablet 75 milliGRAM(s) Oral daily  dextrose 40% Gel 15 Gram(s) Oral once  dextrose 5%. 1000 milliLiter(s) (50 mL/Hr) IV Continuous <Continuous>  dextrose 5%. 1000 milliLiter(s) (100 mL/Hr) IV Continuous <Continuous>  dextrose 50% Injectable 25 Gram(s) IV Push once  dextrose 50% Injectable 12.5 Gram(s) IV Push once  dextrose 50% Injectable 25 Gram(s) IV Push once  glucagon  Injectable 1 milliGRAM(s) IntraMuscular once  heparin   Injectable 5000 Unit(s) SubCutaneous every 12 hours  hydrochlorothiazide 25 milliGRAM(s) Oral daily  insulin glargine Injectable (LANTUS) 15 Unit(s) SubCutaneous at bedtime  insulin lispro (ADMELOG) corrective regimen sliding scale   SubCutaneous three times a day before meals  insulin lispro Injectable (ADMELOG) 5 Unit(s) SubCutaneous three times a day before meals  levothyroxine 137 MICROGram(s) Oral daily  losartan 100 milliGRAM(s) Oral daily  pantoprazole    Tablet 40 milliGRAM(s) Oral before breakfast  senna 2 Tablet(s) Oral at bedtime    PRN Meds:  lactulose Syrup 10 Gram(s) Oral two times a day PRN constipation    HOME MEDICATIONS:  amLODIPine 5 mg oral tablet: 1 tab(s) orally once a day  aspirin 81 mg oral tablet: 1 tab(s) orally once a day  Crestor 5 mg oral tablet: 1 tab(s) orally once a day  folic acid 1 mg oral tablet: 1 tab(s) orally once a day  levothyroxine 137 mcg (0.137 mg) oral tablet: 1 tab(s) orally once a day  losartan-hydrochlorothiazide 100 mg-25 mg oral tablet: 1 tab(s) orally once a day  metFORMIN 1000 mg oral tablet: 1 tab(s) orally 2 times a day  Mobic 15 mg oral tablet: 1 tab(s) orally once a day, As Needed      Vital Signs:   T(F): 97.2 (06-01-21 @ 05:18), Max: 97.2 (06-01-21 @ 05:18)  HR: 68 (06-01-21 @ 05:18) (68 - 74)  BP: 122/68 (06-01-21 @ 05:43) (116/66 - 127/71)  RR: 18 (06-01-21 @ 05:18) (18 - 18)  SpO2: --    Physical Exam:   GENERAL: NAD, Pleasant and conversive, appearing stated age   HEENT: NCAT, PERRLA, EOMI  CHEST/LUNG: Clear to auscultation bilaterally, No wheezing, rhonchi, rales   HEART: Regular rate and rhythm; s1 s2 appreciated, No murmurs, rubs, or gallops  ABDOMEN: Soft, Nontender, Nondistended; Bowel sounds present  EXTREMITIES: No LE edema b/l  SKIN: no rashes, no new lesions  NERVOUS SYSTEM:  Alert & Oriented X3, Muscle strength 4+/5 in RLE and 5/5 in all other extremities. Sensation equal and intact. Cranial nerves ii-xii grossly intact  LINES/CATHETERS: Peripheral IV    Labs:                         12.0   4.59  )-----------( 181      ( 31 May 2021 05:33 )             37.6       01 Jun 2021 06:56    141    |  103    |  18     ----------------------------<  168    3.8     |  28     |  1.1      Ca    9.0        01 Jun 2021 06:56  Mg     2.2       01 Jun 2021 06:56    Radiology:   None Today     Assessment and Plan:   This is a 66 year old female pmh DM, HTN, DLD, total thyroidectomy, hypothyroidism, unspecified heart murmur on baby ASA p/w CVA-like symptoms. Hypertensive in ED to 220s/120s.     #L hemifacial droop and R sided focal deficits 2/2 acute CVA   - Neurology following  - CTH: no territorial infarct but more prominent microvascular changes compared to prior exam on 5/24/19  - CTA H/N: Focal severe stenosis of proximal L intracranial vertebral artery, mod-sev stenosis of mid basilar artery  - MRI acute ischemic strokes in L CR and R occipital stroke   - Noted heart murmur history  - Continue on ASA 81mg, atorvastatin 80 mg, and plavix 75mg qd  - Continue Neurochecks every 4 hours  - A1c 8.2  - lipid panel , ,   - PT/OT/physiatry eval  - TTE with bubble: EF 51%, bubble negative for PFO, G1DD  - Cardiology consulted for RONALD today prior to discharge   - Loop recorder in place     #HTN   - continue on amlodipine 5 mg     #DM  - a1c 8.2   - Monitor finger sticks   - Continue on Basal/bolus insulin  - continue on Lantus 15 Lispro 5/5/5    #DLD  - Continue on atorvastatin 80 mg     #Hypothyroidism  #Hx of complete thyroidectomy  #Hyperenhancing soft tissue structures visualized in the anterior soft tissues of the neck and base of tongue.   - CT angio of neck: Hyper-enhancing soft tissue structures visualized in the anterior soft tissues of the neck and base of tongue. Findings may reflect enhancing glandular tissue, ectopic thyroid or mass; this may be confirmed with nuclear medicine thyroid scan  - Thyroid scan as an outpatient due to iodine contrast load; plan for test in 3 weeks from discahrge   - Continue on Levothyroxine     Activity: As tolerated  Diet: DASH/Carb  DVT ppx: Heparin   GI ppx: Protonix  Code Status: Full Code  Dispo: Dispo planning; home with outpatient services

## 2021-06-08 DIAGNOSIS — R20.0 ANESTHESIA OF SKIN: ICD-10-CM

## 2021-06-08 DIAGNOSIS — R91.1 SOLITARY PULMONARY NODULE: ICD-10-CM

## 2021-06-08 DIAGNOSIS — Z79.84 LONG TERM (CURRENT) USE OF ORAL HYPOGLYCEMIC DRUGS: ICD-10-CM

## 2021-06-08 DIAGNOSIS — K14.9 DISEASE OF TONGUE, UNSPECIFIED: ICD-10-CM

## 2021-06-08 DIAGNOSIS — I10 ESSENTIAL (PRIMARY) HYPERTENSION: ICD-10-CM

## 2021-06-08 DIAGNOSIS — Z79.890 HORMONE REPLACEMENT THERAPY: ICD-10-CM

## 2021-06-08 DIAGNOSIS — I63.9 CEREBRAL INFARCTION, UNSPECIFIED: ICD-10-CM

## 2021-06-08 DIAGNOSIS — R13.10 DYSPHAGIA, UNSPECIFIED: ICD-10-CM

## 2021-06-08 DIAGNOSIS — I65.02 OCCLUSION AND STENOSIS OF LEFT VERTEBRAL ARTERY: ICD-10-CM

## 2021-06-08 DIAGNOSIS — R29.701 NIHSS SCORE 1: ICD-10-CM

## 2021-06-08 DIAGNOSIS — K59.00 CONSTIPATION, UNSPECIFIED: ICD-10-CM

## 2021-06-08 DIAGNOSIS — I51.89 OTHER ILL-DEFINED HEART DISEASES: ICD-10-CM

## 2021-06-08 DIAGNOSIS — I65.1 OCCLUSION AND STENOSIS OF BASILAR ARTERY: ICD-10-CM

## 2021-06-08 DIAGNOSIS — R47.81 SLURRED SPEECH: ICD-10-CM

## 2021-06-08 DIAGNOSIS — E83.42 HYPOMAGNESEMIA: ICD-10-CM

## 2021-06-08 DIAGNOSIS — G81.91 HEMIPLEGIA, UNSPECIFIED AFFECTING RIGHT DOMINANT SIDE: ICD-10-CM

## 2021-06-08 DIAGNOSIS — E89.0 POSTPROCEDURAL HYPOTHYROIDISM: ICD-10-CM

## 2021-06-08 DIAGNOSIS — Z79.82 LONG TERM (CURRENT) USE OF ASPIRIN: ICD-10-CM

## 2021-06-08 DIAGNOSIS — R29.810 FACIAL WEAKNESS: ICD-10-CM

## 2021-06-08 DIAGNOSIS — I71.2 THORACIC AORTIC ANEURYSM, WITHOUT RUPTURE: ICD-10-CM

## 2021-06-08 DIAGNOSIS — E87.6 HYPOKALEMIA: ICD-10-CM

## 2021-06-08 DIAGNOSIS — R47.1 DYSARTHRIA AND ANARTHRIA: ICD-10-CM

## 2021-06-08 DIAGNOSIS — E11.9 TYPE 2 DIABETES MELLITUS WITHOUT COMPLICATIONS: ICD-10-CM

## 2021-06-08 DIAGNOSIS — Z20.822 CONTACT WITH AND (SUSPECTED) EXPOSURE TO COVID-19: ICD-10-CM

## 2021-06-21 ENCOUNTER — APPOINTMENT (OUTPATIENT)
Dept: CARDIOLOGY | Facility: CLINIC | Age: 66
End: 2021-06-21

## 2021-07-02 ENCOUNTER — NON-APPOINTMENT (OUTPATIENT)
Age: 66
End: 2021-07-02

## 2021-07-02 ENCOUNTER — APPOINTMENT (OUTPATIENT)
Dept: CARDIOLOGY | Facility: CLINIC | Age: 66
End: 2021-07-02
Payer: MEDICARE

## 2021-07-02 PROCEDURE — G2066: CPT

## 2021-07-02 PROCEDURE — 93298 REM INTERROG DEV EVAL SCRMS: CPT

## 2021-07-21 ENCOUNTER — APPOINTMENT (OUTPATIENT)
Dept: CARDIOLOGY | Facility: CLINIC | Age: 66
End: 2021-07-21

## 2021-08-06 ENCOUNTER — NON-APPOINTMENT (OUTPATIENT)
Age: 66
End: 2021-08-06

## 2021-08-06 ENCOUNTER — APPOINTMENT (OUTPATIENT)
Dept: CARDIOLOGY | Facility: CLINIC | Age: 66
End: 2021-08-06
Payer: MEDICARE

## 2021-08-06 PROCEDURE — 93298 REM INTERROG DEV EVAL SCRMS: CPT

## 2021-08-06 PROCEDURE — G2066: CPT

## 2021-09-10 ENCOUNTER — NON-APPOINTMENT (OUTPATIENT)
Age: 66
End: 2021-09-10

## 2021-09-10 ENCOUNTER — APPOINTMENT (OUTPATIENT)
Dept: CARDIOLOGY | Facility: CLINIC | Age: 66
End: 2021-09-10
Payer: MEDICARE

## 2021-09-10 PROCEDURE — G2066: CPT

## 2021-09-10 PROCEDURE — 93298 REM INTERROG DEV EVAL SCRMS: CPT

## 2021-10-15 ENCOUNTER — APPOINTMENT (OUTPATIENT)
Dept: CARDIOLOGY | Facility: CLINIC | Age: 66
End: 2021-10-15

## 2021-11-04 ENCOUNTER — FORM ENCOUNTER (OUTPATIENT)
Age: 66
End: 2021-11-04

## 2021-11-19 ENCOUNTER — APPOINTMENT (OUTPATIENT)
Dept: CARDIOLOGY | Facility: CLINIC | Age: 66
End: 2021-11-19

## 2021-11-30 ENCOUNTER — FORM ENCOUNTER (OUTPATIENT)
Age: 66
End: 2021-11-30

## 2021-12-16 NOTE — ED PROVIDER NOTE - MEDICAL DECISION MAKING DETAILS
verified with central tele  
FS improved, pt well appearing, exam unchanged case discusses with Dr. Walters who wants pt to f/u on tuesday, start amlodipine 10mg once a day pt and son aware

## 2021-12-28 ENCOUNTER — NON-APPOINTMENT (OUTPATIENT)
Age: 66
End: 2021-12-28

## 2021-12-28 ENCOUNTER — APPOINTMENT (OUTPATIENT)
Dept: CARDIOLOGY | Facility: CLINIC | Age: 66
End: 2021-12-28
Payer: MEDICARE

## 2021-12-28 PROCEDURE — G2066: CPT

## 2021-12-28 PROCEDURE — 93298 REM INTERROG DEV EVAL SCRMS: CPT

## 2022-01-28 ENCOUNTER — APPOINTMENT (OUTPATIENT)
Dept: CARDIOLOGY | Facility: CLINIC | Age: 67
End: 2022-01-28
Payer: MEDICARE

## 2022-01-28 ENCOUNTER — NON-APPOINTMENT (OUTPATIENT)
Age: 67
End: 2022-01-28

## 2022-01-28 PROCEDURE — G2066: CPT

## 2022-01-28 PROCEDURE — 93298 REM INTERROG DEV EVAL SCRMS: CPT

## 2022-03-04 ENCOUNTER — NON-APPOINTMENT (OUTPATIENT)
Age: 67
End: 2022-03-04

## 2022-03-04 ENCOUNTER — APPOINTMENT (OUTPATIENT)
Dept: CARDIOLOGY | Facility: CLINIC | Age: 67
End: 2022-03-04
Payer: MEDICARE

## 2022-03-04 PROCEDURE — 93298 REM INTERROG DEV EVAL SCRMS: CPT

## 2022-03-04 PROCEDURE — G2066: CPT

## 2022-04-08 ENCOUNTER — APPOINTMENT (OUTPATIENT)
Dept: CARDIOLOGY | Facility: CLINIC | Age: 67
End: 2022-04-08
Payer: MEDICARE

## 2022-04-08 ENCOUNTER — NON-APPOINTMENT (OUTPATIENT)
Age: 67
End: 2022-04-08

## 2022-04-08 PROCEDURE — G2066: CPT

## 2022-04-08 PROCEDURE — 93298 REM INTERROG DEV EVAL SCRMS: CPT

## 2022-05-13 ENCOUNTER — APPOINTMENT (OUTPATIENT)
Dept: CARDIOLOGY | Facility: CLINIC | Age: 67
End: 2022-05-13

## 2022-05-15 ENCOUNTER — FORM ENCOUNTER (OUTPATIENT)
Age: 67
End: 2022-05-15

## 2022-06-17 ENCOUNTER — APPOINTMENT (OUTPATIENT)
Dept: CARDIOLOGY | Facility: CLINIC | Age: 67
End: 2022-06-17
Payer: MEDICARE

## 2022-06-17 ENCOUNTER — NON-APPOINTMENT (OUTPATIENT)
Age: 67
End: 2022-06-17

## 2022-06-17 PROCEDURE — 93298 REM INTERROG DEV EVAL SCRMS: CPT

## 2022-06-17 PROCEDURE — G2066: CPT

## 2022-07-22 ENCOUNTER — APPOINTMENT (OUTPATIENT)
Dept: CARDIOLOGY | Facility: CLINIC | Age: 67
End: 2022-07-22

## 2022-07-22 ENCOUNTER — NON-APPOINTMENT (OUTPATIENT)
Age: 67
End: 2022-07-22

## 2022-07-22 PROCEDURE — G2066: CPT

## 2022-07-22 PROCEDURE — 93298 REM INTERROG DEV EVAL SCRMS: CPT

## 2022-08-17 NOTE — OCCUPATIONAL THERAPY INITIAL EVALUATION ADULT - STANDING BALANCE: DYNAMIC, REHAB EVAL
Spoke with patient. No concern for thrush. Sil Marcelo in past but had issues with insurance coverage. We will switch back to Wixela at higher dose. RN: Order placed for Wixela 500/50. good minus

## 2022-08-25 ENCOUNTER — FORM ENCOUNTER (OUTPATIENT)
Age: 67
End: 2022-08-25

## 2022-08-26 ENCOUNTER — APPOINTMENT (OUTPATIENT)
Dept: CARDIOLOGY | Facility: CLINIC | Age: 67
End: 2022-08-26

## 2022-09-30 ENCOUNTER — APPOINTMENT (OUTPATIENT)
Dept: CARDIOLOGY | Facility: CLINIC | Age: 67
End: 2022-09-30

## 2022-10-02 ENCOUNTER — FORM ENCOUNTER (OUTPATIENT)
Age: 67
End: 2022-10-02

## 2022-10-06 NOTE — ED ADULT NURSE NOTE - TEMPLATE LIST FOR HEAD TO TOE ASSESSMENT
Uneventful night. Denies pain, VSS. Isolation maintained. Planing discharge for today. Safety maintained. Will continue monitor.     Problem: Pain  Goal: #Acceptable pain level achieved/maintained at rest using NRS/Faces  Description: This goal is used for patients who can self-report.  Acceptable means the level is at or below the identified comfort/function goal.  Outcome: Outcome Met, Continue evaluating goal progress toward completion     Problem: Impaired Physical Mobility  Goal: # Bed mobility, ambulation, and ADLs are maintained or returned to baseline during hospitalization  Outcome: Outcome Met, Continue evaluating goal progress toward completion      General

## 2022-11-04 ENCOUNTER — APPOINTMENT (OUTPATIENT)
Dept: CARDIOLOGY | Facility: CLINIC | Age: 67
End: 2022-11-04

## 2022-11-04 ENCOUNTER — NON-APPOINTMENT (OUTPATIENT)
Age: 67
End: 2022-11-04

## 2022-11-04 PROCEDURE — 93298 REM INTERROG DEV EVAL SCRMS: CPT

## 2022-11-04 PROCEDURE — G2066: CPT

## 2022-12-08 ENCOUNTER — APPOINTMENT (OUTPATIENT)
Dept: CARDIOLOGY | Facility: CLINIC | Age: 67
End: 2022-12-08

## 2022-12-08 ENCOUNTER — FORM ENCOUNTER (OUTPATIENT)
Age: 67
End: 2022-12-08

## 2023-01-12 ENCOUNTER — FORM ENCOUNTER (OUTPATIENT)
Age: 68
End: 2023-01-12

## 2023-01-12 ENCOUNTER — APPOINTMENT (OUTPATIENT)
Dept: CARDIOLOGY | Facility: CLINIC | Age: 68
End: 2023-01-12

## 2023-02-17 ENCOUNTER — APPOINTMENT (OUTPATIENT)
Dept: CARDIOLOGY | Facility: CLINIC | Age: 68
End: 2023-02-17

## 2023-02-20 ENCOUNTER — FORM ENCOUNTER (OUTPATIENT)
Age: 68
End: 2023-02-20

## 2023-03-08 NOTE — PROGRESS NOTE ADULT - ATTENDING SUPERVISION STATEMENT
Patients daughter-in-law Mikel Ramón calling to request to know date of last flu shot and date of pneumonia shot.
ACP
ACP
Resident
ACP
Resident

## 2023-03-21 ENCOUNTER — APPOINTMENT (OUTPATIENT)
Dept: CARDIOLOGY | Facility: CLINIC | Age: 68
End: 2023-03-21

## 2023-12-12 NOTE — ED PROVIDER NOTE - PRINCIPAL DIAGNOSIS
Noted. RN called patient and let her know she can  the spacer and the inhaler. Asked her to have pharm do a demonstration on use.   Patient has no other questions.  Kari Painter RN on 12/12/2023 at 4:31 PM     CVA (cerebral vascular accident)

## 2023-12-19 ENCOUNTER — INPATIENT (INPATIENT)
Facility: HOSPITAL | Age: 68
LOS: 1 days | Discharge: HOME HEALTH | DRG: 637 | End: 2023-12-21
Attending: HOSPITALIST | Admitting: STUDENT IN AN ORGANIZED HEALTH CARE EDUCATION/TRAINING PROGRAM
Payer: MEDICARE

## 2023-12-19 VITALS
TEMPERATURE: 98 F | WEIGHT: 167.99 LBS | DIASTOLIC BLOOD PRESSURE: 106 MMHG | SYSTOLIC BLOOD PRESSURE: 188 MMHG | RESPIRATION RATE: 16 BRPM | HEART RATE: 71 BPM | OXYGEN SATURATION: 96 %

## 2023-12-19 DIAGNOSIS — R73.9 HYPERGLYCEMIA, UNSPECIFIED: ICD-10-CM

## 2023-12-19 LAB
ALBUMIN SERPL ELPH-MCNC: 4.5 G/DL — SIGNIFICANT CHANGE UP (ref 3.5–5.2)
ALBUMIN SERPL ELPH-MCNC: 4.5 G/DL — SIGNIFICANT CHANGE UP (ref 3.5–5.2)
ALP SERPL-CCNC: 116 U/L — HIGH (ref 30–115)
ALP SERPL-CCNC: 116 U/L — HIGH (ref 30–115)
ALT FLD-CCNC: 9 U/L — SIGNIFICANT CHANGE UP (ref 0–41)
ALT FLD-CCNC: 9 U/L — SIGNIFICANT CHANGE UP (ref 0–41)
ANION GAP SERPL CALC-SCNC: 13 MMOL/L — SIGNIFICANT CHANGE UP (ref 7–14)
ANION GAP SERPL CALC-SCNC: 13 MMOL/L — SIGNIFICANT CHANGE UP (ref 7–14)
APPEARANCE UR: ABNORMAL
APPEARANCE UR: ABNORMAL
AST SERPL-CCNC: 12 U/L — SIGNIFICANT CHANGE UP (ref 0–41)
AST SERPL-CCNC: 12 U/L — SIGNIFICANT CHANGE UP (ref 0–41)
B-OH-BUTYR SERPL-SCNC: <0.2 MMOL/L — SIGNIFICANT CHANGE UP
B-OH-BUTYR SERPL-SCNC: <0.2 MMOL/L — SIGNIFICANT CHANGE UP
BACTERIA # UR AUTO: ABNORMAL /HPF
BACTERIA # UR AUTO: ABNORMAL /HPF
BASE EXCESS BLDV CALC-SCNC: 5.1 MMOL/L — HIGH (ref -2–3)
BASE EXCESS BLDV CALC-SCNC: 5.1 MMOL/L — HIGH (ref -2–3)
BASOPHILS # BLD AUTO: 0.03 K/UL — SIGNIFICANT CHANGE UP (ref 0–0.2)
BASOPHILS # BLD AUTO: 0.03 K/UL — SIGNIFICANT CHANGE UP (ref 0–0.2)
BASOPHILS NFR BLD AUTO: 0.7 % — SIGNIFICANT CHANGE UP (ref 0–1)
BASOPHILS NFR BLD AUTO: 0.7 % — SIGNIFICANT CHANGE UP (ref 0–1)
BILIRUB SERPL-MCNC: 0.2 MG/DL — SIGNIFICANT CHANGE UP (ref 0.2–1.2)
BILIRUB SERPL-MCNC: 0.2 MG/DL — SIGNIFICANT CHANGE UP (ref 0.2–1.2)
BILIRUB UR-MCNC: NEGATIVE — SIGNIFICANT CHANGE UP
BILIRUB UR-MCNC: NEGATIVE — SIGNIFICANT CHANGE UP
BUN SERPL-MCNC: 13 MG/DL — SIGNIFICANT CHANGE UP (ref 10–20)
BUN SERPL-MCNC: 13 MG/DL — SIGNIFICANT CHANGE UP (ref 10–20)
CA-I SERPL-SCNC: 1.15 MMOL/L — SIGNIFICANT CHANGE UP (ref 1.15–1.33)
CA-I SERPL-SCNC: 1.15 MMOL/L — SIGNIFICANT CHANGE UP (ref 1.15–1.33)
CALCIUM SERPL-MCNC: 9.6 MG/DL — SIGNIFICANT CHANGE UP (ref 8.4–10.5)
CALCIUM SERPL-MCNC: 9.6 MG/DL — SIGNIFICANT CHANGE UP (ref 8.4–10.5)
CAST: 0 /LPF — SIGNIFICANT CHANGE UP (ref 0–4)
CAST: 0 /LPF — SIGNIFICANT CHANGE UP (ref 0–4)
CHLORIDE SERPL-SCNC: 92 MMOL/L — LOW (ref 98–110)
CHLORIDE SERPL-SCNC: 92 MMOL/L — LOW (ref 98–110)
CO2 SERPL-SCNC: 27 MMOL/L — SIGNIFICANT CHANGE UP (ref 17–32)
CO2 SERPL-SCNC: 27 MMOL/L — SIGNIFICANT CHANGE UP (ref 17–32)
COLOR SPEC: YELLOW — SIGNIFICANT CHANGE UP
COLOR SPEC: YELLOW — SIGNIFICANT CHANGE UP
CREAT SERPL-MCNC: 0.9 MG/DL — SIGNIFICANT CHANGE UP (ref 0.7–1.5)
CREAT SERPL-MCNC: 0.9 MG/DL — SIGNIFICANT CHANGE UP (ref 0.7–1.5)
DIFF PNL FLD: NEGATIVE — SIGNIFICANT CHANGE UP
DIFF PNL FLD: NEGATIVE — SIGNIFICANT CHANGE UP
EGFR: 69 ML/MIN/1.73M2 — SIGNIFICANT CHANGE UP
EGFR: 69 ML/MIN/1.73M2 — SIGNIFICANT CHANGE UP
EOSINOPHIL # BLD AUTO: 0.02 K/UL — SIGNIFICANT CHANGE UP (ref 0–0.7)
EOSINOPHIL # BLD AUTO: 0.02 K/UL — SIGNIFICANT CHANGE UP (ref 0–0.7)
EOSINOPHIL NFR BLD AUTO: 0.5 % — SIGNIFICANT CHANGE UP (ref 0–8)
EOSINOPHIL NFR BLD AUTO: 0.5 % — SIGNIFICANT CHANGE UP (ref 0–8)
GAS PNL BLDV: 128 MMOL/L — LOW (ref 136–145)
GAS PNL BLDV: 128 MMOL/L — LOW (ref 136–145)
GAS PNL BLDV: SIGNIFICANT CHANGE UP
GLUCOSE BLDC GLUCOMTR-MCNC: 490 MG/DL — CRITICAL HIGH (ref 70–99)
GLUCOSE BLDC GLUCOMTR-MCNC: 490 MG/DL — CRITICAL HIGH (ref 70–99)
GLUCOSE SERPL-MCNC: 550 MG/DL — CRITICAL HIGH (ref 70–99)
GLUCOSE SERPL-MCNC: 550 MG/DL — CRITICAL HIGH (ref 70–99)
GLUCOSE UR QL: >=1000 MG/DL
GLUCOSE UR QL: >=1000 MG/DL
HCO3 BLDV-SCNC: 30 MMOL/L — HIGH (ref 22–29)
HCO3 BLDV-SCNC: 30 MMOL/L — HIGH (ref 22–29)
HCT VFR BLD CALC: 39.1 % — SIGNIFICANT CHANGE UP (ref 37–47)
HCT VFR BLD CALC: 39.1 % — SIGNIFICANT CHANGE UP (ref 37–47)
HCT VFR BLDA CALC: 39 % — SIGNIFICANT CHANGE UP (ref 34.5–46.5)
HCT VFR BLDA CALC: 39 % — SIGNIFICANT CHANGE UP (ref 34.5–46.5)
HGB BLD CALC-MCNC: 13.1 G/DL — SIGNIFICANT CHANGE UP (ref 11.7–16.1)
HGB BLD CALC-MCNC: 13.1 G/DL — SIGNIFICANT CHANGE UP (ref 11.7–16.1)
HGB BLD-MCNC: 12.4 G/DL — SIGNIFICANT CHANGE UP (ref 12–16)
HGB BLD-MCNC: 12.4 G/DL — SIGNIFICANT CHANGE UP (ref 12–16)
IMM GRANULOCYTES NFR BLD AUTO: 0.2 % — SIGNIFICANT CHANGE UP (ref 0.1–0.3)
IMM GRANULOCYTES NFR BLD AUTO: 0.2 % — SIGNIFICANT CHANGE UP (ref 0.1–0.3)
KETONES UR-MCNC: NEGATIVE MG/DL — SIGNIFICANT CHANGE UP
KETONES UR-MCNC: NEGATIVE MG/DL — SIGNIFICANT CHANGE UP
LACTATE BLDV-MCNC: 3.7 MMOL/L — HIGH (ref 0.5–2)
LACTATE BLDV-MCNC: 3.7 MMOL/L — HIGH (ref 0.5–2)
LACTATE SERPL-SCNC: 3.7 MMOL/L — HIGH (ref 0.7–2)
LACTATE SERPL-SCNC: 3.7 MMOL/L — HIGH (ref 0.7–2)
LEUKOCYTE ESTERASE UR-ACNC: NEGATIVE — SIGNIFICANT CHANGE UP
LEUKOCYTE ESTERASE UR-ACNC: NEGATIVE — SIGNIFICANT CHANGE UP
LYMPHOCYTES # BLD AUTO: 1.21 K/UL — SIGNIFICANT CHANGE UP (ref 1.2–3.4)
LYMPHOCYTES # BLD AUTO: 1.21 K/UL — SIGNIFICANT CHANGE UP (ref 1.2–3.4)
LYMPHOCYTES # BLD AUTO: 30 % — SIGNIFICANT CHANGE UP (ref 20.5–51.1)
LYMPHOCYTES # BLD AUTO: 30 % — SIGNIFICANT CHANGE UP (ref 20.5–51.1)
MCHC RBC-ENTMCNC: 24.8 PG — LOW (ref 27–31)
MCHC RBC-ENTMCNC: 24.8 PG — LOW (ref 27–31)
MCHC RBC-ENTMCNC: 31.7 G/DL — LOW (ref 32–37)
MCHC RBC-ENTMCNC: 31.7 G/DL — LOW (ref 32–37)
MCV RBC AUTO: 78 FL — LOW (ref 81–99)
MCV RBC AUTO: 78 FL — LOW (ref 81–99)
MONOCYTES # BLD AUTO: 0.42 K/UL — SIGNIFICANT CHANGE UP (ref 0.1–0.6)
MONOCYTES # BLD AUTO: 0.42 K/UL — SIGNIFICANT CHANGE UP (ref 0.1–0.6)
MONOCYTES NFR BLD AUTO: 10.4 % — HIGH (ref 1.7–9.3)
MONOCYTES NFR BLD AUTO: 10.4 % — HIGH (ref 1.7–9.3)
NEUTROPHILS # BLD AUTO: 2.34 K/UL — SIGNIFICANT CHANGE UP (ref 1.4–6.5)
NEUTROPHILS # BLD AUTO: 2.34 K/UL — SIGNIFICANT CHANGE UP (ref 1.4–6.5)
NEUTROPHILS NFR BLD AUTO: 58.2 % — SIGNIFICANT CHANGE UP (ref 42.2–75.2)
NEUTROPHILS NFR BLD AUTO: 58.2 % — SIGNIFICANT CHANGE UP (ref 42.2–75.2)
NITRITE UR-MCNC: NEGATIVE — SIGNIFICANT CHANGE UP
NITRITE UR-MCNC: NEGATIVE — SIGNIFICANT CHANGE UP
NRBC # BLD: 0 /100 WBCS — SIGNIFICANT CHANGE UP (ref 0–0)
NRBC # BLD: 0 /100 WBCS — SIGNIFICANT CHANGE UP (ref 0–0)
PCO2 BLDV: 47 MMHG — HIGH (ref 39–42)
PCO2 BLDV: 47 MMHG — HIGH (ref 39–42)
PH BLDV: 7.42 — SIGNIFICANT CHANGE UP (ref 7.32–7.43)
PH BLDV: 7.42 — SIGNIFICANT CHANGE UP (ref 7.32–7.43)
PH UR: 7.5 — SIGNIFICANT CHANGE UP (ref 5–8)
PH UR: 7.5 — SIGNIFICANT CHANGE UP (ref 5–8)
PLATELET # BLD AUTO: 190 K/UL — SIGNIFICANT CHANGE UP (ref 130–400)
PLATELET # BLD AUTO: 190 K/UL — SIGNIFICANT CHANGE UP (ref 130–400)
PMV BLD: 11.7 FL — HIGH (ref 7.4–10.4)
PMV BLD: 11.7 FL — HIGH (ref 7.4–10.4)
PO2 BLDV: 40 MMHG — SIGNIFICANT CHANGE UP (ref 25–45)
PO2 BLDV: 40 MMHG — SIGNIFICANT CHANGE UP (ref 25–45)
POTASSIUM BLDV-SCNC: 4 MMOL/L — SIGNIFICANT CHANGE UP (ref 3.5–5.1)
POTASSIUM BLDV-SCNC: 4 MMOL/L — SIGNIFICANT CHANGE UP (ref 3.5–5.1)
POTASSIUM SERPL-MCNC: 4.2 MMOL/L — SIGNIFICANT CHANGE UP (ref 3.5–5)
POTASSIUM SERPL-MCNC: 4.2 MMOL/L — SIGNIFICANT CHANGE UP (ref 3.5–5)
POTASSIUM SERPL-SCNC: 4.2 MMOL/L — SIGNIFICANT CHANGE UP (ref 3.5–5)
POTASSIUM SERPL-SCNC: 4.2 MMOL/L — SIGNIFICANT CHANGE UP (ref 3.5–5)
PROT SERPL-MCNC: 7.9 G/DL — SIGNIFICANT CHANGE UP (ref 6–8)
PROT SERPL-MCNC: 7.9 G/DL — SIGNIFICANT CHANGE UP (ref 6–8)
PROT UR-MCNC: NEGATIVE MG/DL — SIGNIFICANT CHANGE UP
PROT UR-MCNC: NEGATIVE MG/DL — SIGNIFICANT CHANGE UP
RBC # BLD: 5.01 M/UL — SIGNIFICANT CHANGE UP (ref 4.2–5.4)
RBC # BLD: 5.01 M/UL — SIGNIFICANT CHANGE UP (ref 4.2–5.4)
RBC # FLD: 13.9 % — SIGNIFICANT CHANGE UP (ref 11.5–14.5)
RBC # FLD: 13.9 % — SIGNIFICANT CHANGE UP (ref 11.5–14.5)
RBC CASTS # UR COMP ASSIST: 0 /HPF — SIGNIFICANT CHANGE UP (ref 0–4)
RBC CASTS # UR COMP ASSIST: 0 /HPF — SIGNIFICANT CHANGE UP (ref 0–4)
SAO2 % BLDV: 66.9 % — LOW (ref 67–88)
SAO2 % BLDV: 66.9 % — LOW (ref 67–88)
SODIUM SERPL-SCNC: 132 MMOL/L — LOW (ref 135–146)
SODIUM SERPL-SCNC: 132 MMOL/L — LOW (ref 135–146)
SP GR SPEC: 1.03 — SIGNIFICANT CHANGE UP (ref 1–1.03)
SP GR SPEC: 1.03 — SIGNIFICANT CHANGE UP (ref 1–1.03)
SQUAMOUS # UR AUTO: 12 /HPF — HIGH (ref 0–5)
SQUAMOUS # UR AUTO: 12 /HPF — HIGH (ref 0–5)
UROBILINOGEN FLD QL: 1 MG/DL — SIGNIFICANT CHANGE UP (ref 0.2–1)
UROBILINOGEN FLD QL: 1 MG/DL — SIGNIFICANT CHANGE UP (ref 0.2–1)
WBC # BLD: 4.03 K/UL — LOW (ref 4.8–10.8)
WBC # BLD: 4.03 K/UL — LOW (ref 4.8–10.8)
WBC # FLD AUTO: 4.03 K/UL — LOW (ref 4.8–10.8)
WBC # FLD AUTO: 4.03 K/UL — LOW (ref 4.8–10.8)
WBC UR QL: 4 /HPF — SIGNIFICANT CHANGE UP (ref 0–5)
WBC UR QL: 4 /HPF — SIGNIFICANT CHANGE UP (ref 0–5)

## 2023-12-19 PROCEDURE — 99285 EMERGENCY DEPT VISIT HI MDM: CPT

## 2023-12-19 PROCEDURE — 83605 ASSAY OF LACTIC ACID: CPT

## 2023-12-19 PROCEDURE — 85025 COMPLETE CBC W/AUTO DIFF WBC: CPT

## 2023-12-19 PROCEDURE — 71046 X-RAY EXAM CHEST 2 VIEWS: CPT | Mod: 26

## 2023-12-19 PROCEDURE — 83036 HEMOGLOBIN GLYCOSYLATED A1C: CPT

## 2023-12-19 PROCEDURE — 82010 KETONE BODYS QUAN: CPT

## 2023-12-19 PROCEDURE — 70450 CT HEAD/BRAIN W/O DYE: CPT | Mod: 26,MA

## 2023-12-19 PROCEDURE — 99223 1ST HOSP IP/OBS HIGH 75: CPT

## 2023-12-19 PROCEDURE — 36415 COLL VENOUS BLD VENIPUNCTURE: CPT

## 2023-12-19 PROCEDURE — 71046 X-RAY EXAM CHEST 2 VIEWS: CPT

## 2023-12-19 PROCEDURE — 83735 ASSAY OF MAGNESIUM: CPT

## 2023-12-19 PROCEDURE — 82962 GLUCOSE BLOOD TEST: CPT

## 2023-12-19 PROCEDURE — 92610 EVALUATE SWALLOWING FUNCTION: CPT | Mod: GN

## 2023-12-19 PROCEDURE — 80053 COMPREHEN METABOLIC PANEL: CPT

## 2023-12-19 RX ORDER — SODIUM CHLORIDE 9 MG/ML
1000 INJECTION INTRAMUSCULAR; INTRAVENOUS; SUBCUTANEOUS ONCE
Refills: 0 | Status: COMPLETED | OUTPATIENT
Start: 2023-12-19 | End: 2023-12-19

## 2023-12-19 RX ORDER — INSULIN LISPRO 100/ML
8 VIAL (ML) SUBCUTANEOUS ONCE
Refills: 0 | Status: DISCONTINUED | OUTPATIENT
Start: 2023-12-19 | End: 2023-12-19

## 2023-12-19 RX ORDER — LOSARTAN POTASSIUM 100 MG/1
100 TABLET, FILM COATED ORAL DAILY
Refills: 0 | Status: DISCONTINUED | OUTPATIENT
Start: 2023-12-19 | End: 2023-12-20

## 2023-12-19 RX ORDER — INSULIN HUMAN 100 [IU]/ML
6 INJECTION, SOLUTION SUBCUTANEOUS ONCE
Refills: 0 | Status: COMPLETED | OUTPATIENT
Start: 2023-12-19 | End: 2023-12-19

## 2023-12-19 RX ORDER — INFLUENZA VIRUS VACCINE 15; 15; 15; 15 UG/.5ML; UG/.5ML; UG/.5ML; UG/.5ML
0.7 SUSPENSION INTRAMUSCULAR ONCE
Refills: 0 | Status: DISCONTINUED | OUTPATIENT
Start: 2023-12-19 | End: 2023-12-21

## 2023-12-19 RX ORDER — INSULIN HUMAN 100 [IU]/ML
8 INJECTION, SOLUTION SUBCUTANEOUS ONCE
Refills: 0 | Status: COMPLETED | OUTPATIENT
Start: 2023-12-19 | End: 2023-12-19

## 2023-12-19 RX ADMIN — INSULIN HUMAN 6 UNIT(S): 100 INJECTION, SOLUTION SUBCUTANEOUS at 18:55

## 2023-12-19 RX ADMIN — SODIUM CHLORIDE 1000 MILLILITER(S): 9 INJECTION INTRAMUSCULAR; INTRAVENOUS; SUBCUTANEOUS at 16:56

## 2023-12-19 RX ADMIN — LOSARTAN POTASSIUM 100 MILLIGRAM(S): 100 TABLET, FILM COATED ORAL at 18:55

## 2023-12-19 NOTE — CONSULT NOTE ADULT - CONSULT REASON
age indeterminate left internal capsule lacunae infarct age indeterminate left internal capsule lacunar infarct

## 2023-12-19 NOTE — ED ADULT TRIAGE NOTE - CHIEF COMPLAINT QUOTE
Patient who admits to be non compliant with medication was sent in by visiting nurse after being found to be hypertensive and Hyperglycemic . Patient denies any symptoms currently

## 2023-12-19 NOTE — ED ADULT NURSE NOTE - NSFALLUNIVINTERV_ED_ALL_ED
Bed/Stretcher in lowest position, wheels locked, appropriate side rails in place/Call bell, personal items and telephone in reach/Instruct patient to call for assistance before getting out of bed/chair/stretcher/Non-slip footwear applied when patient is off stretcher/[place] to call system/Physically safe environment - no spills, clutter or unnecessary equipment/Purposeful proactive rounding/Room/bathroom lighting operational, light cord in reach Bed/Stretcher in lowest position, wheels locked, appropriate side rails in place/Call bell, personal items and telephone in reach/Instruct patient to call for assistance before getting out of bed/chair/stretcher/Non-slip footwear applied when patient is off stretcher/[place] to call system/Physically safe environment - no spills, clutter or unnecessary equipment/Purposeful proactive rounding/Room/bathroom lighting operational, light cord in reach

## 2023-12-19 NOTE — CONSULT NOTE ADULT - NS ATTEND AMEND GEN_ALL_CORE FT
Pt is a 69 yo F with PMHx of uncontrolled DM, HTN, HLD, prior left corona radiata/punctate right occipital ischemic strokes 05/2021 s/p ILR placement, who presented with encephalopathy. FS glucose was over 500. CT head showed interval age indeterminate ischemic stroke in left genu of internal capsule just anterior to location of prior stroke. Pt denies any new focal symptoms. NIHSS 0.     MRI brain without contrast  Continue ASA/statin  Recommend ILR interrogation  Optimize cerebrovascular risk factors, particularly DM.

## 2023-12-19 NOTE — ED PROVIDER NOTE - PHYSICAL EXAMINATION
CONSTITUTIONAL: non-toxic appearing female, nad  SKIN: skin exam is warm and dry  HEAD: Normocephalic; atraumatic  EYES: PERRL, EOM intact; conjunctiva and sclera clear.  ENT: MMM   NECK: ROM intact  CARD: S1, S2 normal, no murmur  RESP: No wheezes, rales or rhonchi. Good air movement bilaterally  ABD: soft; non-distended; non-tender.    EXT: Normal ROM   NEURO: awake, alert, following commands, oriented, grossly unremarkable. No Focal deficits. GCS 15.   PSYCH: Cooperative, appropriate.

## 2023-12-19 NOTE — ED PROVIDER NOTE - OBJECTIVE STATEMENT
68 year old female, past medical history htn, hdl, dm, bib ems for eval. patient with visiting nurse today noted to have elevated FS and elevated BP prompting call to ED. patient lives @ home alone, reports intermittent non-compliance with meds, last took medication yesterday. reports cough. denies f/c, chest pain, shortness of breath, abd pain, nausea/vomiting/diarrhea, rash, syncope. no falls/trauma.

## 2023-12-19 NOTE — PATIENT PROFILE ADULT - FALL HARM RISK - UNIVERSAL INTERVENTIONS
Bed in lowest position, wheels locked, appropriate side rails in place/Call bell, personal items and telephone in reach/Instruct patient to call for assistance before getting out of bed or chair/Non-slip footwear when patient is out of bed/[place] to call system/Physically safe environment - no spills, clutter or unnecessary equipment/Purposeful Proactive Rounding/Room/bathroom lighting operational, light cord in reach Bed in lowest position, wheels locked, appropriate side rails in place/Call bell, personal items and telephone in reach/Instruct patient to call for assistance before getting out of bed or chair/Non-slip footwear when patient is out of bed/[place] to call system/Physically safe environment - no spills, clutter or unnecessary equipment/Purposeful Proactive Rounding/Room/bathroom lighting operational, light cord in reach

## 2023-12-19 NOTE — CONSULT NOTE ADULT - SUBJECTIVE AND OBJECTIVE BOX
Neurology Consult    HPI   Patient is a 68y old  w pmh of htn, hld, dm, stroke hx, consulted for neurology for AMS, (axox2, normally 3) CTH findings consistent with age indeterminate left internal capusle infarct, and FS over 500. Pt states she forgot to take her medication last night, which she has never done before. This afternoon when the pt was at her annual primary care appointment when her doctor told her to urgently go to the ED after finding her glucose to be elevated. Pt states she has had strokes in the past which left her with no residual deficits. Pt states she feels normal. Pt denies chest pain, shortness of breath, abd pain, nausea/vomiting/diarrhea, rash, syncope. no falls/trauma, extremity weakness, or vision changes. Pt states she takes asprin and is compliant.           PAST MEDICAL & SURGICAL HISTORY:  Diabetes      Hypertension          FAMILY HISTORY:      Social History: (-) x 3    Allergies    No Known Allergies    Intolerances        MEDICATIONS  (STANDING):  losartan 100 milliGRAM(s) Oral daily    MEDICATIONS  (PRN):      Review of systems:    see hpi     Vital Signs Last 24 Hrs  T(C): 36.6 (19 Dec 2023 18:06), Max: 36.6 (19 Dec 2023 16:21)  T(F): 97.8 (19 Dec 2023 18:06), Max: 97.8 (19 Dec 2023 16:21)  HR: 71 (19 Dec 2023 16:21) (71 - 71)  BP: 188/106 (19 Dec 2023 16:21) (188/106 - 188/106)  BP(mean): --  RR: 16 (19 Dec 2023 16:21) (16 - 16)  SpO2: 96% (19 Dec 2023 16:21) (96% - 96%)    Parameters below as of 19 Dec 2023 16:21  Patient On (Oxygen Delivery Method): room air        Examination:  General:  Appearance is consistent with chronologic age.  No abnormal facies.  Gross skin survey within normal limits.    Cognitive/Language:  The patient is oriented to person, place, time and date.  Recent and remote memory intact.  Language with normal repetition, comprehension and naming.  Nondysarthric.    Eyes: intact VA, VFF.  EOMI w/o nystagmus,  PERRL.  Pt has ptosis of left eye, states this is a normal finding for her.   Face:  Facial sensation normal V1 - 3, no facial droop.   Ears/Nose/Throat:  Hearing grossly intact b/l.    Motor examination:   Normal tone, bulk and range of motion.  No tenderness, twitching, tremors or involuntary movements.  Formal Muscle Strength Testing: (MRC grade R/L) 5/5 UE; 5/5 LE.  No observable drift..  Sensory examination:   Intact to light touch  in all extremities.  Cerebellum:   FTN/HKS intact with normal MARCELLUS in all limbs.  No dysmetria.     NIH- 0     Labs:   CBC Full  -  ( 19 Dec 2023 17:05 )  WBC Count : 4.03 K/uL  RBC Count : 5.01 M/uL  Hemoglobin : 12.4 g/dL  Hematocrit : 39.1 %  Platelet Count - Automated : 190 K/uL  Mean Cell Volume : 78.0 fL  Mean Cell Hemoglobin : 24.8 pg  Mean Cell Hemoglobin Concentration : 31.7 g/dL  Auto Neutrophil # : 2.34 K/uL  Auto Lymphocyte # : 1.21 K/uL  Auto Monocyte # : 0.42 K/uL  Auto Eosinophil # : 0.02 K/uL  Auto Basophil # : 0.03 K/uL  Auto Neutrophil % : 58.2 %  Auto Lymphocyte % : 30.0 %  Auto Monocyte % : 10.4 %  Auto Eosinophil % : 0.5 %  Auto Basophil % : 0.7 %        132<L>  |  92<L>  |  13  ----------------------------<  550<HH>  4.2   |  27  |  0.9    Ca    9.6      19 Dec 2023 17:05    TPro  7.9  /  Alb  4.5  /  TBili  0.2  /  DBili  x   /  AST  12  /  ALT  9   /  AlkPhos  116<H>  12-19    LIVER FUNCTIONS - ( 19 Dec 2023 17:05 )  Alb: 4.5 g/dL / Pro: 7.9 g/dL / ALK PHOS: 116 U/L / ALT: 9 U/L / AST: 12 U/L / GGT: x             Urinalysis Basic - ( 19 Dec 2023 18:07 )    Color: Yellow / Appearance: Cloudy / S.027 / pH: x  Gluc: x / Ketone: Negative mg/dL  / Bili: Negative / Urobili: 1.0 mg/dL   Blood: x / Protein: Negative mg/dL / Nitrite: Negative   Leuk Esterase: Negative / RBC: 0 /HPF / WBC 4 /HPF   Sq Epi: x / Non Sq Epi: 12 /HPF / Bacteria: Few /HPF          Neuroimaging:  NCHCT: CT Head No Cont:   ACC: [id number] EXAM:  CT BRAIN   ORDERED BY: NAN SINGLETON     PROCEDURE DATE:  2023          INTERPRETATION:  CLINICAL INDICATION: Hypertension.    Technique: CT of the head was performed without contrast.    Multiple contiguous axial images were acquired from the skull base to the   vertex without the administration of intravenous contrast.  Coronal and   sagittal reformations were made.    COMPARISON: CT head 2021    FINDINGS:  Ventricles and cortical sulcal pattern are age appropriate.    Gray-white differentiation is maintained. No evidence of recent   territorial infarction.    No acute intracranial hemorrhage or extra-axial fluid collection.    No intracranial mass effect or midline shift. No vasogenic edema.    Chronic microvascular ischemic changes. Age indeterminate lacunar infarct   at the genu of the left internal capsule (series 2 image 12) abutting the   third ventricle.    No depressed calvarial fracture. Mastoid air cells are clear without   opacification or coalescence. Sphenoid sinus mucous retention cyst versus   polyp. Globes and orbits have a normal CT appearance as visualized.      IMPRESSION:  1.  No evidence of acute intracranial pathology.  2.  Chronic microvascular changes. Age-indeterminate left internal   capsule lacunar infarct.    --- End of Report ---            VLADIMIR OLMOS MD; Attending Radiologist  This document has been electronically signed. Dec 19 2023  6:03PM (23 @ 17:16)      23 @ 18:58       Neurology Consult    HPI   Patient is a 68y old  w pmh of htn, hld, dm, stroke hx, consulted for neurology for AMS, (axox2, normally 3) CTH findings consistent with age indeterminate left internal capusle infarct, and FS over 500. Pt states she forgot to take her medication last night, which she has never done before. This afternoon when the pt was at her annual primary care appointment when her doctor told her to urgently go to the ED after finding her glucose to be elevated. Pt states she has had strokes in the past which left her with no residual deficits. Pt states she feels normal. Pt denies chest pain, shortness of breath, abd pain, nausea/vomiting/diarrhea, rash, syncope. no falls/trauma, extremity weakness, or vision changes. Pt states she takes asprin and is compliant.           PAST MEDICAL & SURGICAL HISTORY:  Diabetes      Hypertension          FAMILY HISTORY:      Social History: (-) x 3    Allergies    No Known Allergies    Intolerances        MEDICATIONS  (STANDING):  losartan 100 milliGRAM(s) Oral daily    MEDICATIONS  (PRN):      Review of systems:    see hpi     Vital Signs Last 24 Hrs  T(C): 36.6 (19 Dec 2023 18:06), Max: 36.6 (19 Dec 2023 16:21)  T(F): 97.8 (19 Dec 2023 18:06), Max: 97.8 (19 Dec 2023 16:21)  HR: 71 (19 Dec 2023 16:21) (71 - 71)  BP: 188/106 (19 Dec 2023 16:21) (188/106 - 188/106)  BP(mean): --  RR: 16 (19 Dec 2023 16:21) (16 - 16)  SpO2: 96% (19 Dec 2023 16:21) (96% - 96%)    Parameters below as of 19 Dec 2023 16:21  Patient On (Oxygen Delivery Method): room air        Examination:  General:  Appearance is consistent with chronologic age.  No abnormal facies.  Gross skin survey within normal limits.    Cognitive/Language:  The patient is oriented to person, place, time and date.  Recent and remote memory intact.  Language with normal repetition, comprehension and naming.  Nondysarthric.    Eyes: intact VA, VFF.  EOMI w/o nystagmus,  PERRL.  Pt has ptosis of left eye, states this is a normal finding for her.   Face:  Facial sensation normal V1 - 3, no facial droop.   Ears/Nose/Throat:  Hearing grossly intact b/l.    Motor examination:   Normal tone, bulk and range of motion.  No tenderness, twitching, tremors or involuntary movements.  Formal Muscle Strength Testing: (MRC grade R/L) 5/5 UE; 5/5 LE.  No observable drift..  Sensory examination:   Intact to light touch  in all extremities.  Cerebellum:   FTN/HKS intact with normal MARCELLUS in all limbs.  No dysmetria.     NIH- 0     Labs:   CBC Full  -  ( 19 Dec 2023 17:05 )  WBC Count : 4.03 K/uL  RBC Count : 5.01 M/uL  Hemoglobin : 12.4 g/dL  Hematocrit : 39.1 %  Platelet Count - Automated : 190 K/uL  Mean Cell Volume : 78.0 fL  Mean Cell Hemoglobin : 24.8 pg  Mean Cell Hemoglobin Concentration : 31.7 g/dL  Auto Neutrophil # : 2.34 K/uL  Auto Lymphocyte # : 1.21 K/uL  Auto Monocyte # : 0.42 K/uL  Auto Eosinophil # : 0.02 K/uL  Auto Basophil # : 0.03 K/uL  Auto Neutrophil % : 58.2 %  Auto Lymphocyte % : 30.0 %  Auto Monocyte % : 10.4 %  Auto Eosinophil % : 0.5 %  Auto Basophil % : 0.7 %        132<L>  |  92<L>  |  13  ----------------------------<  550<HH>  4.2   |  27  |  0.9    Ca    9.6      19 Dec 2023 17:05    TPro  7.9  /  Alb  4.5  /  TBili  0.2  /  DBili  x   /  AST  12  /  ALT  9   /  AlkPhos  116<H>  12-19    LIVER FUNCTIONS - ( 19 Dec 2023 17:05 )  Alb: 4.5 g/dL / Pro: 7.9 g/dL / ALK PHOS: 116 U/L / ALT: 9 U/L / AST: 12 U/L / GGT: x             Urinalysis Basic - ( 19 Dec 2023 18:07 )    Color: Yellow / Appearance: Cloudy / S.027 / pH: x  Gluc: x / Ketone: Negative mg/dL  / Bili: Negative / Urobili: 1.0 mg/dL   Blood: x / Protein: Negative mg/dL / Nitrite: Negative   Leuk Esterase: Negative / RBC: 0 /HPF / WBC 4 /HPF   Sq Epi: x / Non Sq Epi: 12 /HPF / Bacteria: Few /HPF          Neuroimaging:  NCHCT: CT Head No Cont:   ACC: [id number] EXAM:  CT BRAIN   ORDERED BY: NAN SINGLETON     PROCEDURE DATE:  2023          INTERPRETATION:  CLINICAL INDICATION: Hypertension.    Technique: CT of the head was performed without contrast.    Multiple contiguous axial images were acquired from the skull base to the   vertex without the administration of intravenous contrast.  Coronal and   sagittal reformations were made.    COMPARISON: CT head 2021    FINDINGS:  Ventricles and cortical sulcal pattern are age appropriate.    Gray-white differentiation is maintained. No evidence of recent   territorial infarction.    No acute intracranial hemorrhage or extra-axial fluid collection.    No intracranial mass effect or midline shift. No vasogenic edema.    Chronic microvascular ischemic changes. Age indeterminate lacunar infarct   at the genu of the left internal capsule (series 2 image 12) abutting the   third ventricle.    No depressed calvarial fracture. Mastoid air cells are clear without   opacification or coalescence. Sphenoid sinus mucous retention cyst versus   polyp. Globes and orbits have a normal CT appearance as visualized.      IMPRESSION:  1.  No evidence of acute intracranial pathology.  2.  Chronic microvascular changes. Age-indeterminate left internal   capsule lacunar infarct.    --- End of Report ---            VLADIMIR OLMOS MD; Attending Radiologist  This document has been electronically signed. Dec 19 2023  6:03PM (23 @ 17:16)      23 @ 18:58       Neurology Consult    HPI   Patient is a 68y old  w pmh of htn, hld, dm, stroke hx, consulted for neurology for AMS, (axox2, normally 3) CTH findings consistent with age indeterminate left internal capsule infarct, and FS over 500. Pt states she forgot to take her medication last night, which she has never done before. This afternoon when the pt was at her annual primary care appointment when her doctor told her to urgently go to the ED after finding her glucose to be elevated. Pt states she has had strokes in the past which left her with no residual deficits. Pt states she feels normal. Pt denies chest pain, shortness of breath, abd pain, nausea/vomiting/diarrhea, rash, syncope. no falls/trauma, extremity weakness, or vision changes. Pt states she takes asprin and is compliant.           PAST MEDICAL & SURGICAL HISTORY:  Diabetes      Hypertension          FAMILY HISTORY:      Social History: (-) x 3    Allergies    No Known Allergies    Intolerances        MEDICATIONS  (STANDING):  losartan 100 milliGRAM(s) Oral daily    MEDICATIONS  (PRN):      Review of systems:    see hpi     Vital Signs Last 24 Hrs  T(C): 36.6 (19 Dec 2023 18:06), Max: 36.6 (19 Dec 2023 16:21)  T(F): 97.8 (19 Dec 2023 18:06), Max: 97.8 (19 Dec 2023 16:21)  HR: 71 (19 Dec 2023 16:21) (71 - 71)  BP: 188/106 (19 Dec 2023 16:21) (188/106 - 188/106)  BP(mean): --  RR: 16 (19 Dec 2023 16:21) (16 - 16)  SpO2: 96% (19 Dec 2023 16:21) (96% - 96%)    Parameters below as of 19 Dec 2023 16:21  Patient On (Oxygen Delivery Method): room air        Examination:  General:  Appearance is consistent with chronologic age.  No abnormal facies.  Gross skin survey within normal limits.    Cognitive/Language:  The patient is oriented to person, place, time and date.  Recent and remote memory intact.  Language with normal repetition, comprehension and naming.  Nondysarthric.    Eyes: intact VA, VFF.  EOMI w/o nystagmus,  PERRL.  Pt has ptosis of left eye, states this is a normal finding for her.   Face:  Facial sensation normal V1 - 3, no facial droop.   Ears/Nose/Throat:  Hearing grossly intact b/l.    Motor examination:   Normal tone, bulk and range of motion.  No tenderness, twitching, tremors or involuntary movements.  Formal Muscle Strength Testing: (MRC grade R/L) 5/5 UE; 5/5 LE.  No observable drift..  Sensory examination:   Intact to light touch  in all extremities.  Cerebellum:   FTN/HKS intact with normal MARCELLUS in all limbs.  No dysmetria.     NIH- 0     Labs:   CBC Full  -  ( 19 Dec 2023 17:05 )  WBC Count : 4.03 K/uL  RBC Count : 5.01 M/uL  Hemoglobin : 12.4 g/dL  Hematocrit : 39.1 %  Platelet Count - Automated : 190 K/uL  Mean Cell Volume : 78.0 fL  Mean Cell Hemoglobin : 24.8 pg  Mean Cell Hemoglobin Concentration : 31.7 g/dL  Auto Neutrophil # : 2.34 K/uL  Auto Lymphocyte # : 1.21 K/uL  Auto Monocyte # : 0.42 K/uL  Auto Eosinophil # : 0.02 K/uL  Auto Basophil # : 0.03 K/uL  Auto Neutrophil % : 58.2 %  Auto Lymphocyte % : 30.0 %  Auto Monocyte % : 10.4 %  Auto Eosinophil % : 0.5 %  Auto Basophil % : 0.7 %        132<L>  |  92<L>  |  13  ----------------------------<  550<HH>  4.2   |  27  |  0.9    Ca    9.6      19 Dec 2023 17:05    TPro  7.9  /  Alb  4.5  /  TBili  0.2  /  DBili  x   /  AST  12  /  ALT  9   /  AlkPhos  116<H>  12-19    LIVER FUNCTIONS - ( 19 Dec 2023 17:05 )  Alb: 4.5 g/dL / Pro: 7.9 g/dL / ALK PHOS: 116 U/L / ALT: 9 U/L / AST: 12 U/L / GGT: x             Urinalysis Basic - ( 19 Dec 2023 18:07 )    Color: Yellow / Appearance: Cloudy / S.027 / pH: x  Gluc: x / Ketone: Negative mg/dL  / Bili: Negative / Urobili: 1.0 mg/dL   Blood: x / Protein: Negative mg/dL / Nitrite: Negative   Leuk Esterase: Negative / RBC: 0 /HPF / WBC 4 /HPF   Sq Epi: x / Non Sq Epi: 12 /HPF / Bacteria: Few /HPF          Neuroimaging:  NCHCT: CT Head No Cont:   ACC: [id number] EXAM:  CT BRAIN   ORDERED BY: NAN SINGLETON     PROCEDURE DATE:  2023          INTERPRETATION:  CLINICAL INDICATION: Hypertension.    Technique: CT of the head was performed without contrast.    Multiple contiguous axial images were acquired from the skull base to the   vertex without the administration of intravenous contrast.  Coronal and   sagittal reformations were made.    COMPARISON: CT head 2021    FINDINGS:  Ventricles and cortical sulcal pattern are age appropriate.    Gray-white differentiation is maintained. No evidence of recent   territorial infarction.    No acute intracranial hemorrhage or extra-axial fluid collection.    No intracranial mass effect or midline shift. No vasogenic edema.    Chronic microvascular ischemic changes. Age indeterminate lacunar infarct   at the genu of the left internal capsule (series 2 image 12) abutting the   third ventricle.    No depressed calvarial fracture. Mastoid air cells are clear without   opacification or coalescence. Sphenoid sinus mucous retention cyst versus   polyp. Globes and orbits have a normal CT appearance as visualized.      IMPRESSION:  1.  No evidence of acute intracranial pathology.  2.  Chronic microvascular changes. Age-indeterminate left internal   capsule lacunar infarct.    --- End of Report ---            VLADIMIR OLMOS MD; Attending Radiologist  This document has been electronically signed. Dec 19 2023  6:03PM (23 @ 17:16)      23 @ 18:58       Neurology Consult    HPI   Patient is a 68y old  w pmh of htn, hld, dm, stroke hx, consulted for neurology for AMS, (axox2, normally 3) CTH findings consistent with age indeterminate left internal capsule infarct, and FS over 500. Pt states she forgot to take her medication last night, which she has never done before. This afternoon when the pt was at her annual primary care appointment when her doctor told her to urgently go to the ED after finding her glucose to be elevated. Pt states she has had strokes in the past which left her with no residual deficits. Pt states she feels normal. Pt denies chest pain, shortness of breath, abd pain, nausea/vomiting/diarrhea, rash, syncope. no falls/trauma, extremity weakness, or vision changes. Pt states she takes asprin and is compliant.           PAST MEDICAL & SURGICAL HISTORY:  Diabetes      Hypertension          FAMILY HISTORY:      Social History: (-) x 3    Allergies    No Known Allergies    Intolerances        MEDICATIONS  (STANDING):  losartan 100 milliGRAM(s) Oral daily    MEDICATIONS  (PRN):      Review of systems:    see hpi     Vital Signs Last 24 Hrs  T(C): 36.6 (19 Dec 2023 18:06), Max: 36.6 (19 Dec 2023 16:21)  T(F): 97.8 (19 Dec 2023 18:06), Max: 97.8 (19 Dec 2023 16:21)  HR: 71 (19 Dec 2023 16:21) (71 - 71)  BP: 188/106 (19 Dec 2023 16:21) (188/106 - 188/106)  BP(mean): --  RR: 16 (19 Dec 2023 16:21) (16 - 16)  SpO2: 96% (19 Dec 2023 16:21) (96% - 96%)    Parameters below as of 19 Dec 2023 16:21  Patient On (Oxygen Delivery Method): room air        Examination:  General:  Appearance is consistent with chronologic age.  No abnormal facies.  Gross skin survey within normal limits.    Cognitive/Language:  The patient is oriented to person, place, time and date.  Recent and remote memory intact.  Language with normal repetition, comprehension and naming.  Nondysarthric.    Eyes: intact VA, VFF.  EOMI w/o nystagmus,  PERRL.  Pt has ptosis of left eye, states this is a normal finding for her.   Face:  Facial sensation normal V1 - 3, no facial droop.   Ears/Nose/Throat:  Hearing grossly intact b/l.    Motor examination:   Normal tone, bulk and range of motion.  No tenderness, twitching, tremors or involuntary movements.  Formal Muscle Strength Testing: (MRC grade R/L) 5/5 UE; 5/5 LE.  No observable drift..  Sensory examination:   Intact to light touch  in all extremities.  Cerebellum:   FTN/HKS intact with normal MARCELLUS in all limbs.  No dysmetria.     NIH- 0     Labs:   CBC Full  -  ( 19 Dec 2023 17:05 )  WBC Count : 4.03 K/uL  RBC Count : 5.01 M/uL  Hemoglobin : 12.4 g/dL  Hematocrit : 39.1 %  Platelet Count - Automated : 190 K/uL  Mean Cell Volume : 78.0 fL  Mean Cell Hemoglobin : 24.8 pg  Mean Cell Hemoglobin Concentration : 31.7 g/dL  Auto Neutrophil # : 2.34 K/uL  Auto Lymphocyte # : 1.21 K/uL  Auto Monocyte # : 0.42 K/uL  Auto Eosinophil # : 0.02 K/uL  Auto Basophil # : 0.03 K/uL  Auto Neutrophil % : 58.2 %  Auto Lymphocyte % : 30.0 %  Auto Monocyte % : 10.4 %  Auto Eosinophil % : 0.5 %  Auto Basophil % : 0.7 %        132<L>  |  92<L>  |  13  ----------------------------<  550<HH>  4.2   |  27  |  0.9    Ca    9.6      19 Dec 2023 17:05    TPro  7.9  /  Alb  4.5  /  TBili  0.2  /  DBili  x   /  AST  12  /  ALT  9   /  AlkPhos  116<H>  12-19    LIVER FUNCTIONS - ( 19 Dec 2023 17:05 )  Alb: 4.5 g/dL / Pro: 7.9 g/dL / ALK PHOS: 116 U/L / ALT: 9 U/L / AST: 12 U/L / GGT: x             Urinalysis Basic - ( 19 Dec 2023 18:07 )    Color: Yellow / Appearance: Cloudy / S.027 / pH: x  Gluc: x / Ketone: Negative mg/dL  / Bili: Negative / Urobili: 1.0 mg/dL   Blood: x / Protein: Negative mg/dL / Nitrite: Negative   Leuk Esterase: Negative / RBC: 0 /HPF / WBC 4 /HPF   Sq Epi: x / Non Sq Epi: 12 /HPF / Bacteria: Few /HPF          Neuroimaging:  NCHCT: CT Head No Cont:   ACC: [id number] EXAM:  CT BRAIN   ORDERED BY: NAN SINGLETON     PROCEDURE DATE:  2023          INTERPRETATION:  CLINICAL INDICATION: Hypertension.    Technique: CT of the head was performed without contrast.    Multiple contiguous axial images were acquired from the skull base to the   vertex without the administration of intravenous contrast.  Coronal and   sagittal reformations were made.    COMPARISON: CT head 2021    FINDINGS:  Ventricles and cortical sulcal pattern are age appropriate.    Gray-white differentiation is maintained. No evidence of recent   territorial infarction.    No acute intracranial hemorrhage or extra-axial fluid collection.    No intracranial mass effect or midline shift. No vasogenic edema.    Chronic microvascular ischemic changes. Age indeterminate lacunar infarct   at the genu of the left internal capsule (series 2 image 12) abutting the   third ventricle.    No depressed calvarial fracture. Mastoid air cells are clear without   opacification or coalescence. Sphenoid sinus mucous retention cyst versus   polyp. Globes and orbits have a normal CT appearance as visualized.      IMPRESSION:  1.  No evidence of acute intracranial pathology.  2.  Chronic microvascular changes. Age-indeterminate left internal   capsule lacunar infarct.    --- End of Report ---            VLADIMIR OLMOS MD; Attending Radiologist  This document has been electronically signed. Dec 19 2023  6:03PM (23 @ 17:16)      23 @ 18:58

## 2023-12-19 NOTE — ED PROVIDER NOTE - CLINICAL SUMMARY MEDICAL DECISION MAKING FREE TEXT BOX
68-year-old female with a past medical history of hypertension, hyperlipidemia, diabetes, presents after visiting nurse called EMS because she was concerned about hyperglycemia and hypertension.  Patient has some baseline memory issues but reports taking all of her medications and this was confirmed with the daughter.  States that she fell otherwise feels normal.  Denies any headache, weakness, numbness, tingling, shortness of breath or chest pain.  On exam nontoxic, vital signs noted, heart regular no murmurs, lungs clear bilaterally, abdomen benign, cranial nerves II through XII intact, 5 out of 5 strength throughout, sensation intact throughout.  Finger-nose intact bilaterally.  Labs notable for hyperglycemia to 550 without signs of DKA, also has lactic acidosis.  Initially got CT head given unclear if has new confusion in the setting of hypertension prior to obtaining history from the daughter and CT head shows age-indeterminate lacunar infarct.  Chest x-ray with possible left lower lobe effusion versus opacity, no pulmonary edema.  Given CT findings, likely need for MRI pending neurology consult, as well as lactic acidosis not improved after IV fluids and hyperglycemia will admit to medicine for further management and treatment

## 2023-12-19 NOTE — CONSULT NOTE ADULT - ASSESSMENT
ASSESSMENT/RECS    Pt is a 67yo female w a pmh of htn, hld, dm, stroke hx consulted neurology for AMS (A&Ox2) and CTH findings consistent of age indeterminate left internal capsule infarct and FS over 500. Pt states she has no symptoms currently, she admits she forgot to take her medications today but is usually compliant. Pt states she was sent to ER by PCP. NIH was 0, no focal defects found on neuro exam, pt is A&O x3. Suspect symptoms are due to hyperglycemia, will get mri to rule out acute infarction.     RECS  - MRI w/o contrast   - repeat cth for acute changes in neuro exam  - continue home asprin   - management per primary team     Discussed w Dr. Phillips  ASSESSMENT/RECS    Pt is a 69yo female w a pmh of htn, hld, dm, stroke hx consulted neurology for AMS (A&Ox2) and CTH findings consistent of age indeterminate left internal capsule infarct and FS over 500. Pt states she has no symptoms currently, she admits she forgot to take her medications today but is usually compliant. Pt states she was sent to ER by PCP. NIH was 0, no focal defects found on neuro exam, pt is A&O x3. Suspect symptoms are due to hyperglycemia, will get mri to rule out acute infarction.     RECS  - MRI w/o contrast   - repeat cth for acute changes in neuro exam  - continue home asprin   - management per primary team     Discussed w Dr. Phillips  ASSESSMENT/RECS    Pt is a 67yo female w a pmh of htn, hld, dm, stroke hx consulted neurology for AMS (A&Ox2) and CTH findings consistent of age indeterminate left internal capsule infarct and FS over 500. Pt states she has no symptoms currently, she admits she forgot to take her medications today but is usually compliant. Pt states she was sent to ER by PCP. NIH was 0, no focal defects found on neuro exam, pt is A&O x3. Suspect symptoms are due to hyperglycemia, will get mri to rule out acute infarction.     RECS  - MRI Brain w/o contrast   - repeat cth for acute changes in neuro exam  - continue home asprin   - management per primary team     Discussed w Dr. Phillips

## 2023-12-20 ENCOUNTER — TRANSCRIPTION ENCOUNTER (OUTPATIENT)
Age: 68
End: 2023-12-20

## 2023-12-20 LAB
A1C WITH ESTIMATED AVERAGE GLUCOSE RESULT: 14.8 % — HIGH (ref 4–5.6)
A1C WITH ESTIMATED AVERAGE GLUCOSE RESULT: 14.8 % — HIGH (ref 4–5.6)
ALBUMIN SERPL ELPH-MCNC: 4.1 G/DL — SIGNIFICANT CHANGE UP (ref 3.5–5.2)
ALBUMIN SERPL ELPH-MCNC: 4.1 G/DL — SIGNIFICANT CHANGE UP (ref 3.5–5.2)
ALP SERPL-CCNC: 102 U/L — SIGNIFICANT CHANGE UP (ref 30–115)
ALP SERPL-CCNC: 102 U/L — SIGNIFICANT CHANGE UP (ref 30–115)
ALT FLD-CCNC: 8 U/L — SIGNIFICANT CHANGE UP (ref 0–41)
ALT FLD-CCNC: 8 U/L — SIGNIFICANT CHANGE UP (ref 0–41)
ANION GAP SERPL CALC-SCNC: 11 MMOL/L — SIGNIFICANT CHANGE UP (ref 7–14)
ANION GAP SERPL CALC-SCNC: 11 MMOL/L — SIGNIFICANT CHANGE UP (ref 7–14)
AST SERPL-CCNC: 10 U/L — SIGNIFICANT CHANGE UP (ref 0–41)
AST SERPL-CCNC: 10 U/L — SIGNIFICANT CHANGE UP (ref 0–41)
B-OH-BUTYR SERPL-SCNC: <0.2 MMOL/L — SIGNIFICANT CHANGE UP
B-OH-BUTYR SERPL-SCNC: <0.2 MMOL/L — SIGNIFICANT CHANGE UP
BASOPHILS # BLD AUTO: 0.04 K/UL — SIGNIFICANT CHANGE UP (ref 0–0.2)
BASOPHILS # BLD AUTO: 0.04 K/UL — SIGNIFICANT CHANGE UP (ref 0–0.2)
BASOPHILS NFR BLD AUTO: 1 % — SIGNIFICANT CHANGE UP (ref 0–1)
BASOPHILS NFR BLD AUTO: 1 % — SIGNIFICANT CHANGE UP (ref 0–1)
BILIRUB SERPL-MCNC: 0.2 MG/DL — SIGNIFICANT CHANGE UP (ref 0.2–1.2)
BILIRUB SERPL-MCNC: 0.2 MG/DL — SIGNIFICANT CHANGE UP (ref 0.2–1.2)
BUN SERPL-MCNC: 10 MG/DL — SIGNIFICANT CHANGE UP (ref 10–20)
BUN SERPL-MCNC: 10 MG/DL — SIGNIFICANT CHANGE UP (ref 10–20)
CALCIUM SERPL-MCNC: 8.9 MG/DL — SIGNIFICANT CHANGE UP (ref 8.4–10.4)
CALCIUM SERPL-MCNC: 8.9 MG/DL — SIGNIFICANT CHANGE UP (ref 8.4–10.4)
CHLORIDE SERPL-SCNC: 96 MMOL/L — LOW (ref 98–110)
CHLORIDE SERPL-SCNC: 96 MMOL/L — LOW (ref 98–110)
CO2 SERPL-SCNC: 27 MMOL/L — SIGNIFICANT CHANGE UP (ref 17–32)
CO2 SERPL-SCNC: 27 MMOL/L — SIGNIFICANT CHANGE UP (ref 17–32)
CREAT SERPL-MCNC: 0.8 MG/DL — SIGNIFICANT CHANGE UP (ref 0.7–1.5)
CREAT SERPL-MCNC: 0.8 MG/DL — SIGNIFICANT CHANGE UP (ref 0.7–1.5)
EGFR: 80 ML/MIN/1.73M2 — SIGNIFICANT CHANGE UP
EGFR: 80 ML/MIN/1.73M2 — SIGNIFICANT CHANGE UP
EOSINOPHIL # BLD AUTO: 0.05 K/UL — SIGNIFICANT CHANGE UP (ref 0–0.7)
EOSINOPHIL # BLD AUTO: 0.05 K/UL — SIGNIFICANT CHANGE UP (ref 0–0.7)
EOSINOPHIL NFR BLD AUTO: 1.3 % — SIGNIFICANT CHANGE UP (ref 0–8)
EOSINOPHIL NFR BLD AUTO: 1.3 % — SIGNIFICANT CHANGE UP (ref 0–8)
ESTIMATED AVERAGE GLUCOSE: 378 MG/DL — HIGH (ref 68–114)
ESTIMATED AVERAGE GLUCOSE: 378 MG/DL — HIGH (ref 68–114)
GLUCOSE BLDC GLUCOMTR-MCNC: 155 MG/DL — HIGH (ref 70–99)
GLUCOSE BLDC GLUCOMTR-MCNC: 155 MG/DL — HIGH (ref 70–99)
GLUCOSE BLDC GLUCOMTR-MCNC: 238 MG/DL — HIGH (ref 70–99)
GLUCOSE BLDC GLUCOMTR-MCNC: 238 MG/DL — HIGH (ref 70–99)
GLUCOSE BLDC GLUCOMTR-MCNC: 262 MG/DL — HIGH (ref 70–99)
GLUCOSE BLDC GLUCOMTR-MCNC: 262 MG/DL — HIGH (ref 70–99)
GLUCOSE BLDC GLUCOMTR-MCNC: 333 MG/DL — HIGH (ref 70–99)
GLUCOSE BLDC GLUCOMTR-MCNC: 333 MG/DL — HIGH (ref 70–99)
GLUCOSE BLDC GLUCOMTR-MCNC: 345 MG/DL — HIGH (ref 70–99)
GLUCOSE BLDC GLUCOMTR-MCNC: 345 MG/DL — HIGH (ref 70–99)
GLUCOSE BLDC GLUCOMTR-MCNC: 347 MG/DL — HIGH (ref 70–99)
GLUCOSE BLDC GLUCOMTR-MCNC: 347 MG/DL — HIGH (ref 70–99)
GLUCOSE BLDC GLUCOMTR-MCNC: 419 MG/DL — HIGH (ref 70–99)
GLUCOSE BLDC GLUCOMTR-MCNC: 419 MG/DL — HIGH (ref 70–99)
GLUCOSE BLDC GLUCOMTR-MCNC: 567 MG/DL — CRITICAL HIGH (ref 70–99)
GLUCOSE BLDC GLUCOMTR-MCNC: 567 MG/DL — CRITICAL HIGH (ref 70–99)
GLUCOSE SERPL-MCNC: 322 MG/DL — HIGH (ref 70–99)
GLUCOSE SERPL-MCNC: 322 MG/DL — HIGH (ref 70–99)
HCT VFR BLD CALC: 36 % — LOW (ref 37–47)
HCT VFR BLD CALC: 36 % — LOW (ref 37–47)
HGB BLD-MCNC: 11.4 G/DL — LOW (ref 12–16)
HGB BLD-MCNC: 11.4 G/DL — LOW (ref 12–16)
IMM GRANULOCYTES NFR BLD AUTO: 0.3 % — SIGNIFICANT CHANGE UP (ref 0.1–0.3)
IMM GRANULOCYTES NFR BLD AUTO: 0.3 % — SIGNIFICANT CHANGE UP (ref 0.1–0.3)
LACTATE SERPL-SCNC: 2 MMOL/L — SIGNIFICANT CHANGE UP (ref 0.7–2)
LACTATE SERPL-SCNC: 2 MMOL/L — SIGNIFICANT CHANGE UP (ref 0.7–2)
LYMPHOCYTES # BLD AUTO: 0.94 K/UL — LOW (ref 1.2–3.4)
LYMPHOCYTES # BLD AUTO: 0.94 K/UL — LOW (ref 1.2–3.4)
LYMPHOCYTES # BLD AUTO: 24.4 % — SIGNIFICANT CHANGE UP (ref 20.5–51.1)
LYMPHOCYTES # BLD AUTO: 24.4 % — SIGNIFICANT CHANGE UP (ref 20.5–51.1)
MAGNESIUM SERPL-MCNC: 1.5 MG/DL — LOW (ref 1.8–2.4)
MAGNESIUM SERPL-MCNC: 1.5 MG/DL — LOW (ref 1.8–2.4)
MCHC RBC-ENTMCNC: 24.7 PG — LOW (ref 27–31)
MCHC RBC-ENTMCNC: 24.7 PG — LOW (ref 27–31)
MCHC RBC-ENTMCNC: 31.7 G/DL — LOW (ref 32–37)
MCHC RBC-ENTMCNC: 31.7 G/DL — LOW (ref 32–37)
MCV RBC AUTO: 78.1 FL — LOW (ref 81–99)
MCV RBC AUTO: 78.1 FL — LOW (ref 81–99)
MONOCYTES # BLD AUTO: 0.4 K/UL — SIGNIFICANT CHANGE UP (ref 0.1–0.6)
MONOCYTES # BLD AUTO: 0.4 K/UL — SIGNIFICANT CHANGE UP (ref 0.1–0.6)
MONOCYTES NFR BLD AUTO: 10.4 % — HIGH (ref 1.7–9.3)
MONOCYTES NFR BLD AUTO: 10.4 % — HIGH (ref 1.7–9.3)
NEUTROPHILS # BLD AUTO: 2.41 K/UL — SIGNIFICANT CHANGE UP (ref 1.4–6.5)
NEUTROPHILS # BLD AUTO: 2.41 K/UL — SIGNIFICANT CHANGE UP (ref 1.4–6.5)
NEUTROPHILS NFR BLD AUTO: 62.6 % — SIGNIFICANT CHANGE UP (ref 42.2–75.2)
NEUTROPHILS NFR BLD AUTO: 62.6 % — SIGNIFICANT CHANGE UP (ref 42.2–75.2)
NRBC # BLD: 0 /100 WBCS — SIGNIFICANT CHANGE UP (ref 0–0)
NRBC # BLD: 0 /100 WBCS — SIGNIFICANT CHANGE UP (ref 0–0)
PLATELET # BLD AUTO: 174 K/UL — SIGNIFICANT CHANGE UP (ref 130–400)
PLATELET # BLD AUTO: 174 K/UL — SIGNIFICANT CHANGE UP (ref 130–400)
PMV BLD: 12.2 FL — HIGH (ref 7.4–10.4)
PMV BLD: 12.2 FL — HIGH (ref 7.4–10.4)
POTASSIUM SERPL-MCNC: 3.7 MMOL/L — SIGNIFICANT CHANGE UP (ref 3.5–5)
POTASSIUM SERPL-MCNC: 3.7 MMOL/L — SIGNIFICANT CHANGE UP (ref 3.5–5)
POTASSIUM SERPL-SCNC: 3.7 MMOL/L — SIGNIFICANT CHANGE UP (ref 3.5–5)
POTASSIUM SERPL-SCNC: 3.7 MMOL/L — SIGNIFICANT CHANGE UP (ref 3.5–5)
PROT SERPL-MCNC: 6.9 G/DL — SIGNIFICANT CHANGE UP (ref 6–8)
PROT SERPL-MCNC: 6.9 G/DL — SIGNIFICANT CHANGE UP (ref 6–8)
RBC # BLD: 4.61 M/UL — SIGNIFICANT CHANGE UP (ref 4.2–5.4)
RBC # BLD: 4.61 M/UL — SIGNIFICANT CHANGE UP (ref 4.2–5.4)
RBC # FLD: 14 % — SIGNIFICANT CHANGE UP (ref 11.5–14.5)
RBC # FLD: 14 % — SIGNIFICANT CHANGE UP (ref 11.5–14.5)
SODIUM SERPL-SCNC: 134 MMOL/L — LOW (ref 135–146)
SODIUM SERPL-SCNC: 134 MMOL/L — LOW (ref 135–146)
WBC # BLD: 3.85 K/UL — LOW (ref 4.8–10.8)
WBC # BLD: 3.85 K/UL — LOW (ref 4.8–10.8)
WBC # FLD AUTO: 3.85 K/UL — LOW (ref 4.8–10.8)
WBC # FLD AUTO: 3.85 K/UL — LOW (ref 4.8–10.8)

## 2023-12-20 PROCEDURE — 99222 1ST HOSP IP/OBS MODERATE 55: CPT

## 2023-12-20 PROCEDURE — 99233 SBSQ HOSP IP/OBS HIGH 50: CPT

## 2023-12-20 RX ORDER — DEXTROSE 50 % IN WATER 50 %
15 SYRINGE (ML) INTRAVENOUS ONCE
Refills: 0 | Status: DISCONTINUED | OUTPATIENT
Start: 2023-12-20 | End: 2023-12-21

## 2023-12-20 RX ORDER — FOLIC ACID 0.8 MG
1 TABLET ORAL DAILY
Refills: 0 | Status: DISCONTINUED | OUTPATIENT
Start: 2023-12-20 | End: 2023-12-21

## 2023-12-20 RX ORDER — INSULIN LISPRO 100/ML
VIAL (ML) SUBCUTANEOUS
Refills: 0 | Status: DISCONTINUED | OUTPATIENT
Start: 2023-12-20 | End: 2023-12-21

## 2023-12-20 RX ORDER — FOLIC ACID 0.8 MG
1 TABLET ORAL
Qty: 0 | Refills: 0 | DISCHARGE

## 2023-12-20 RX ORDER — MAGNESIUM SULFATE 500 MG/ML
2 VIAL (ML) INJECTION
Refills: 0 | Status: COMPLETED | OUTPATIENT
Start: 2023-12-20 | End: 2023-12-20

## 2023-12-20 RX ORDER — GLUCAGON INJECTION, SOLUTION 0.5 MG/.1ML
1 INJECTION, SOLUTION SUBCUTANEOUS ONCE
Refills: 0 | Status: DISCONTINUED | OUTPATIENT
Start: 2023-12-20 | End: 2023-12-21

## 2023-12-20 RX ORDER — ATORVASTATIN CALCIUM 80 MG/1
80 TABLET, FILM COATED ORAL AT BEDTIME
Refills: 0 | Status: DISCONTINUED | OUTPATIENT
Start: 2023-12-20 | End: 2023-12-21

## 2023-12-20 RX ORDER — LEVOTHYROXINE SODIUM 125 MCG
1 TABLET ORAL
Refills: 0 | DISCHARGE

## 2023-12-20 RX ORDER — LEVOTHYROXINE SODIUM 125 MCG
1 TABLET ORAL
Qty: 0 | Refills: 0 | DISCHARGE

## 2023-12-20 RX ORDER — SODIUM CHLORIDE 9 MG/ML
1000 INJECTION, SOLUTION INTRAVENOUS
Refills: 0 | Status: DISCONTINUED | OUTPATIENT
Start: 2023-12-20 | End: 2023-12-21

## 2023-12-20 RX ORDER — ACETAMINOPHEN 500 MG
650 TABLET ORAL EVERY 6 HOURS
Refills: 0 | Status: DISCONTINUED | OUTPATIENT
Start: 2023-12-20 | End: 2023-12-21

## 2023-12-20 RX ORDER — LEVOTHYROXINE SODIUM 125 MCG
150 TABLET ORAL DAILY
Refills: 0 | Status: DISCONTINUED | OUTPATIENT
Start: 2023-12-20 | End: 2023-12-21

## 2023-12-20 RX ORDER — INSULIN HUMAN 100 [IU]/ML
5 INJECTION, SOLUTION SUBCUTANEOUS ONCE
Refills: 0 | Status: COMPLETED | OUTPATIENT
Start: 2023-12-20 | End: 2023-12-20

## 2023-12-20 RX ORDER — DEXTROSE 50 % IN WATER 50 %
25 SYRINGE (ML) INTRAVENOUS ONCE
Refills: 0 | Status: DISCONTINUED | OUTPATIENT
Start: 2023-12-20 | End: 2023-12-21

## 2023-12-20 RX ORDER — LOSARTAN POTASSIUM 100 MG/1
50 TABLET, FILM COATED ORAL DAILY
Refills: 0 | Status: DISCONTINUED | OUTPATIENT
Start: 2023-12-20 | End: 2023-12-21

## 2023-12-20 RX ORDER — FOLIC ACID 0.8 MG
1 TABLET ORAL
Refills: 0 | DISCHARGE

## 2023-12-20 RX ORDER — MAGNESIUM SULFATE 500 MG/ML
2 VIAL (ML) INJECTION ONCE
Refills: 0 | Status: DISCONTINUED | OUTPATIENT
Start: 2023-12-20 | End: 2023-12-20

## 2023-12-20 RX ORDER — AMLODIPINE BESYLATE 2.5 MG/1
10 TABLET ORAL DAILY
Refills: 0 | Status: DISCONTINUED | OUTPATIENT
Start: 2023-12-20 | End: 2023-12-20

## 2023-12-20 RX ORDER — INSULIN LISPRO 100/ML
5 VIAL (ML) SUBCUTANEOUS
Refills: 0 | Status: DISCONTINUED | OUTPATIENT
Start: 2023-12-20 | End: 2023-12-21

## 2023-12-20 RX ORDER — LOSARTAN/HYDROCHLOROTHIAZIDE 100MG-25MG
1 TABLET ORAL
Qty: 0 | Refills: 0 | DISCHARGE

## 2023-12-20 RX ORDER — ASPIRIN/CALCIUM CARB/MAGNESIUM 324 MG
81 TABLET ORAL DAILY
Refills: 0 | Status: DISCONTINUED | OUTPATIENT
Start: 2023-12-20 | End: 2023-12-21

## 2023-12-20 RX ORDER — LOSARTAN/HYDROCHLOROTHIAZIDE 100MG-25MG
1 TABLET ORAL
Refills: 0 | DISCHARGE

## 2023-12-20 RX ORDER — INSULIN GLARGINE 100 [IU]/ML
15 INJECTION, SOLUTION SUBCUTANEOUS AT BEDTIME
Refills: 0 | Status: DISCONTINUED | OUTPATIENT
Start: 2023-12-20 | End: 2023-12-21

## 2023-12-20 RX ORDER — INSULIN HUMAN 100 [IU]/ML
7 INJECTION, SOLUTION SUBCUTANEOUS ONCE
Refills: 0 | Status: COMPLETED | OUTPATIENT
Start: 2023-12-20 | End: 2023-12-20

## 2023-12-20 RX ORDER — DEXTROSE 50 % IN WATER 50 %
12.5 SYRINGE (ML) INTRAVENOUS ONCE
Refills: 0 | Status: DISCONTINUED | OUTPATIENT
Start: 2023-12-20 | End: 2023-12-21

## 2023-12-20 RX ORDER — INSULIN GLARGINE 100 [IU]/ML
16 INJECTION, SOLUTION SUBCUTANEOUS ONCE
Refills: 0 | Status: COMPLETED | OUTPATIENT
Start: 2023-12-20 | End: 2023-12-20

## 2023-12-20 RX ADMIN — AMLODIPINE BESYLATE 10 MILLIGRAM(S): 2.5 TABLET ORAL at 05:15

## 2023-12-20 RX ADMIN — SODIUM CHLORIDE 75 MILLILITER(S): 9 INJECTION, SOLUTION INTRAVENOUS at 03:31

## 2023-12-20 RX ADMIN — Medication 25 GRAM(S): at 15:18

## 2023-12-20 RX ADMIN — Medication 150 MICROGRAM(S): at 05:15

## 2023-12-20 RX ADMIN — Medication 1: at 16:58

## 2023-12-20 RX ADMIN — Medication 5 UNIT(S): at 11:53

## 2023-12-20 RX ADMIN — INSULIN HUMAN 5 UNIT(S): 100 INJECTION, SOLUTION SUBCUTANEOUS at 06:53

## 2023-12-20 RX ADMIN — LOSARTAN POTASSIUM 50 MILLIGRAM(S): 100 TABLET, FILM COATED ORAL at 05:15

## 2023-12-20 RX ADMIN — INSULIN HUMAN 7 UNIT(S): 100 INJECTION, SOLUTION SUBCUTANEOUS at 03:30

## 2023-12-20 RX ADMIN — Medication 5 UNIT(S): at 16:58

## 2023-12-20 RX ADMIN — INSULIN HUMAN 8 UNIT(S): 100 INJECTION, SOLUTION SUBCUTANEOUS at 00:27

## 2023-12-20 RX ADMIN — Medication 1 MILLIGRAM(S): at 11:52

## 2023-12-20 RX ADMIN — Medication 25 GRAM(S): at 11:52

## 2023-12-20 RX ADMIN — Medication 81 MILLIGRAM(S): at 11:52

## 2023-12-20 RX ADMIN — Medication 4: at 08:29

## 2023-12-20 RX ADMIN — ATORVASTATIN CALCIUM 80 MILLIGRAM(S): 80 TABLET, FILM COATED ORAL at 21:25

## 2023-12-20 RX ADMIN — INSULIN GLARGINE 16 UNIT(S): 100 INJECTION, SOLUTION SUBCUTANEOUS at 03:06

## 2023-12-20 RX ADMIN — Medication 2: at 11:52

## 2023-12-20 RX ADMIN — INSULIN GLARGINE 15 UNIT(S): 100 INJECTION, SOLUTION SUBCUTANEOUS at 21:25

## 2023-12-20 NOTE — SWALLOW BEDSIDE ASSESSMENT ADULT - SLP PERTINENT HISTORY OF CURRENT PROBLEM
Pt is a 69 y/o F w/ PMHx: HTN, HLD, DM, hx CVA, presents to the ED for hyperglycemia and hypertension. Pt reports poor medication compliance. Pt is being treated for metabolic encephalopathy 2' hyperglycemia, hypertensive urgency. CTH-> No evidence of acute intracranial pathology. Chronic microvascular changes. Age-indeterminate left internal capsule lacunar infarct. Pt known to SLP dept from previous admission in 2021, recs for ground w/ thin liquids. Pt is a 67 y/o F w/ PMHx: HTN, HLD, DM, hx CVA, presents to the ED for hyperglycemia and hypertension. Pt reports poor medication compliance. Pt is being treated for metabolic encephalopathy 2' hyperglycemia, hypertensive urgency. CTH-> No evidence of acute intracranial pathology. Chronic microvascular changes. Age-indeterminate left internal capsule lacunar infarct. Pt known to SLP dept from previous admission in 2021, recs for ground w/ thin liquids.

## 2023-12-20 NOTE — PROGRESS NOTE ADULT - SUBJECTIVE AND OBJECTIVE BOX
24H events:    Patient is a 68y old Female who presents with a chief complaint of   Primary diagnosis of Hyperglycemia      Today is 1d of hospitalization. This morning patient was seen and examined at bedside, resting comfortably in bed.    No acute or major events overnight.    Code Status:    Family communication:  Contact date:  Name of person contacted:  Relationship to patient:  Communication details:  What matters most:    PAST MEDICAL & SURGICAL HISTORY  Diabetes    Hypertension    CVA (cerebrovascular accident)    Hyperlipidemia    Hypothyroidism      SOCIAL HISTORY:  Social History:      ALLERGIES:  No Known Allergies    MEDICATIONS:  STANDING MEDICATIONS  amLODIPine   Tablet 10 milliGRAM(s) Oral daily  aspirin  chewable 81 milliGRAM(s) Oral daily  atorvastatin 80 milliGRAM(s) Oral at bedtime  dextrose 5%. 1000 milliLiter(s) IV Continuous <Continuous>  dextrose 5%. 1000 milliLiter(s) IV Continuous <Continuous>  dextrose 50% Injectable 12.5 Gram(s) IV Push once  dextrose 50% Injectable 25 Gram(s) IV Push once  dextrose 50% Injectable 25 Gram(s) IV Push once  folic acid 1 milliGRAM(s) Oral daily  glucagon  Injectable 1 milliGRAM(s) IntraMuscular once  hydrochlorothiazide 12.5 milliGRAM(s) Oral daily  influenza  Vaccine (HIGH DOSE) 0.7 milliLiter(s) IntraMuscular once  insulin glargine Injectable (LANTUS) 15 Unit(s) SubCutaneous at bedtime  insulin lispro (ADMELOG) corrective regimen sliding scale   SubCutaneous three times a day before meals  lactated ringers. 1000 milliLiter(s) IV Continuous <Continuous>  levothyroxine 150 MICROGram(s) Oral daily  losartan 50 milliGRAM(s) Oral daily  magnesium sulfate  IVPB 2 Gram(s) IV Intermittent once  magnesium sulfate  IVPB 2 Gram(s) IV Intermittent every 2 hours    PRN MEDICATIONS  acetaminophen     Tablet .. 650 milliGRAM(s) Oral every 6 hours PRN  dextrose Oral Gel 15 Gram(s) Oral once PRN    VITALS:   T(F): 98.4  HR: 91  BP: 123/65  RR: 18  SpO2: 98%    PHYSICAL EXAM:  GENERAL:   ( x) NAD, lying in bed comfortably     (  ) obtunded     (  ) lethargic     (  ) somnolent    HEART:  Rate -->     (x) normal rate     (  ) bradycardic     (  ) tachycardic  Rhythm -->     (x) regular     (  ) regularly irregular     (  ) irregularly irregular  Murmurs -->     (x) normal s1s2     (  ) systolic murmur     (  ) diastolic murmur     (  ) continuous murmur      (  ) S3 present     (  ) S4 present    LUNGS:   ( x)Unlabored respirations     (  ) tachypnea  ( x) B/L air entry     (  ) decreased breath sounds in:  (location     )    ( x) no adventitious sound     (  ) crackles     (  ) wheezing      (  ) rhonchi      (specify location:       )  (  ) chest wall tenderness (specify location:       )    ABDOMEN:   ( x) Soft     (  ) tense   |   (  ) nondistended     (  ) distended   |   (  ) +BS     (  ) hypoactive bowel sounds     (  ) hyperactive bowel sounds  ( x) nontender     (  ) RUQ tenderness     (  ) RLQ tenderness     (  ) LLQ tenderness     (  ) epigastric tenderness     (  ) diffuse tenderness  (  ) Splenomegaly      (  ) Hepatomegaly      (  ) Jaundice     (  ) ecchymosis     EXTREMITIES:  ( x) Normal     (  ) Rash     (  ) ecchymosis     (  ) varicose veins      (  ) pitting edema     (  ) non-pitting edema   (  ) ulceration     (  ) gangrene:     (location:     )    NERVOUS SYSTEM:    ( x) A&Ox3     (  ) confused     (  ) lethargic  CN II-XII:     ( x) Intact     (  ) deficits found     (Specify:     )   Upper extremities:     (  ) no sensorimotor deficits     (  ) weakness     (  ) loss of proprioception/vibration     (  ) loss of touch/temperature (specify:    )  Lower extremities:     (  ) no sensorimotor deficits     (  ) weakness     (  ) loss of proprioception/vibration     (  ) loss of touch/temperature (specify:    )        (  ) Indwelling [place] Catheter:   Date insterted:    Reason (  ) Critical illness     (  ) urinary retention    (  ) Accurate Ins/Outs Monitoring     (  ) CMO patient    (  ) Central Line:   Date inserted:  Location: (  ) Right IJ     (  ) Left IJ     (  ) Right Fem     (  ) Left Fem    (  ) SPC        (  ) pigtail       (  ) PEG tube       (  ) colostomy       (  ) jejunostomy  (  ) U-Dall    LABS:                        12.4   4.03  )-----------( 190      ( 19 Dec 2023 17:05 )             39.1     12-20    134<L>  |  96<L>  |  10  ----------------------------<  322<H>  3.7   |  27  |  0.8    Ca    8.9      20 Dec 2023 06:33  Mg     1.5     12-20    TPro  6.9  /  Alb  4.1  /  TBili  0.2  /  DBili  x   /  AST  10  /  ALT  8   /  AlkPhos  102  12-20      Urinalysis Basic - ( 20 Dec 2023 06:33 )    Color: x / Appearance: x / SG: x / pH: x  Gluc: 322 mg/dL / Ketone: x  / Bili: x / Urobili: x   Blood: x / Protein: x / Nitrite: x   Leuk Esterase: x / RBC: x / WBC x   Sq Epi: x / Non Sq Epi: x / Bacteria: x        Lactate, Blood: 2.0 mmol/L (12-20-23 @ 06:33)  Lactate, Blood: 3.7 mmol/L *H* (12-19-23 @ 19:16)          RADIOLOGY:  CT Head No Cont (12.19.23 @ 17:16)   IMPRESSION:  1.  No evidence of acute intracranial pathology.  2.  Chronic microvascular changes. Age-indeterminate left internal capsule lacunar infarct.        ASSESSMENT AND PLAN  68 year old female PMHx HTN, HLD, DM, hx CVA, presents to the ED for hyperglycemia and hypertension in setting of poor medication compliance. Pt also AAOx2 on arrival to ED, now AAOx3. Admit to Medicine.     # Hyperglycemia   # DM Type II  - home meds: Metformin 1000mg BID (non-compliant)  - glucose 557 on admission, B-hb negative  - pH 7.42, AG 13  - s/p 26U IV insulin  - On IVF LR 75cc/h  - Lactate 3.7 - 2  - On Lantus 15u, ISS  - Follow up A1c  - monitor FS, target glucose 140-180    # Hypertensive urgency  - non-compliant with home medications   - /106  - Resumed Losartan 50mg qd, HCTZ 12.5mg qd  - On amlodipine 10mg daily    # AMS, resolved  # hx CVA  - CT head 12/19: No evidence of acute intracranial pathology. Chronic microvascular changes. Age-indeterminate left internal capsule lacunar infarct.  - Seen by neuro 12/19: recommend MRI brain  - c/w ASA and statin     # HLD  - c/w Atorvastatin 80mg qd    # Hypothyroidism  - c/w Synthroid 150 mcg qd            #DVT PPX:     #GI PPX: none    #Diet: NPO    #Activity Order: as tolerated    #Handoff  - Monitor fingerstick   - Follow up A1c  - MRI Brain           24H events:    Patient is a 68y old Female who presents with a chief complaint of   Primary diagnosis of Hyperglycemia      Today is 1d of hospitalization. This morning patient was seen and examined at bedside, resting comfortably in bed.    No acute or major events overnight.    Code Status:    Family communication:  Contact date:  Name of person contacted:  Relationship to patient:  Communication details:  What matters most:    PAST MEDICAL & SURGICAL HISTORY  Diabetes    Hypertension    CVA (cerebrovascular accident)    Hyperlipidemia    Hypothyroidism      SOCIAL HISTORY:  Social History:      ALLERGIES:  No Known Allergies    MEDICATIONS:  STANDING MEDICATIONS  amLODIPine   Tablet 10 milliGRAM(s) Oral daily  aspirin  chewable 81 milliGRAM(s) Oral daily  atorvastatin 80 milliGRAM(s) Oral at bedtime  dextrose 5%. 1000 milliLiter(s) IV Continuous <Continuous>  dextrose 5%. 1000 milliLiter(s) IV Continuous <Continuous>  dextrose 50% Injectable 12.5 Gram(s) IV Push once  dextrose 50% Injectable 25 Gram(s) IV Push once  dextrose 50% Injectable 25 Gram(s) IV Push once  folic acid 1 milliGRAM(s) Oral daily  glucagon  Injectable 1 milliGRAM(s) IntraMuscular once  hydrochlorothiazide 12.5 milliGRAM(s) Oral daily  influenza  Vaccine (HIGH DOSE) 0.7 milliLiter(s) IntraMuscular once  insulin glargine Injectable (LANTUS) 15 Unit(s) SubCutaneous at bedtime  insulin lispro (ADMELOG) corrective regimen sliding scale   SubCutaneous three times a day before meals  lactated ringers. 1000 milliLiter(s) IV Continuous <Continuous>  levothyroxine 150 MICROGram(s) Oral daily  losartan 50 milliGRAM(s) Oral daily  magnesium sulfate  IVPB 2 Gram(s) IV Intermittent once  magnesium sulfate  IVPB 2 Gram(s) IV Intermittent every 2 hours    PRN MEDICATIONS  acetaminophen     Tablet .. 650 milliGRAM(s) Oral every 6 hours PRN  dextrose Oral Gel 15 Gram(s) Oral once PRN    VITALS:   T(F): 98.4  HR: 91  BP: 123/65  RR: 18  SpO2: 98%    PHYSICAL EXAM:  GENERAL:   ( x) NAD, lying in bed comfortably     (  ) obtunded     (  ) lethargic     (  ) somnolent    HEART:  Rate -->     (x) normal rate     (  ) bradycardic     (  ) tachycardic  Rhythm -->     (x) regular     (  ) regularly irregular     (  ) irregularly irregular  Murmurs -->     (x) normal s1s2     (  ) systolic murmur     (  ) diastolic murmur     (  ) continuous murmur      (  ) S3 present     (  ) S4 present    LUNGS:   ( x)Unlabored respirations     (  ) tachypnea  ( x) B/L air entry     (  ) decreased breath sounds in:  (location     )    ( x) no adventitious sound     (  ) crackles     (  ) wheezing      (  ) rhonchi      (specify location:       )  (  ) chest wall tenderness (specify location:       )    ABDOMEN:   ( x) Soft     (  ) tense   |   (  ) nondistended     (  ) distended   |   (  ) +BS     (  ) hypoactive bowel sounds     (  ) hyperactive bowel sounds  ( x) nontender     (  ) RUQ tenderness     (  ) RLQ tenderness     (  ) LLQ tenderness     (  ) epigastric tenderness     (  ) diffuse tenderness  (  ) Splenomegaly      (  ) Hepatomegaly      (  ) Jaundice     (  ) ecchymosis     EXTREMITIES:  ( x) Normal     (  ) Rash     (  ) ecchymosis     (  ) varicose veins      (  ) pitting edema     (  ) non-pitting edema   (  ) ulceration     (  ) gangrene:     (location:     )    NERVOUS SYSTEM:    ( x) A&Ox3     (  ) confused     (  ) lethargic  CN II-XII:     ( x) Intact     (  ) deficits found     (Specify:     )   Upper extremities:     (  ) no sensorimotor deficits     (  ) weakness     (  ) loss of proprioception/vibration     (  ) loss of touch/temperature (specify:    )  Lower extremities:     (  ) no sensorimotor deficits     (  ) weakness     (  ) loss of proprioception/vibration     (  ) loss of touch/temperature (specify:    )        (  ) Indwelling [place] Catheter:   Date insterted:    Reason (  ) Critical illness     (  ) urinary retention    (  ) Accurate Ins/Outs Monitoring     (  ) CMO patient    (  ) Central Line:   Date inserted:  Location: (  ) Right IJ     (  ) Left IJ     (  ) Right Fem     (  ) Left Fem    (  ) SPC        (  ) pigtail       (  ) PEG tube       (  ) colostomy       (  ) jejunostomy  (  ) U-Dall    LABS:                        12.4   4.03  )-----------( 190      ( 19 Dec 2023 17:05 )             39.1     12-20    134<L>  |  96<L>  |  10  ----------------------------<  322<H>  3.7   |  27  |  0.8    Ca    8.9      20 Dec 2023 06:33  Mg     1.5     12-20    TPro  6.9  /  Alb  4.1  /  TBili  0.2  /  DBili  x   /  AST  10  /  ALT  8   /  AlkPhos  102  12-20      Urinalysis Basic - ( 20 Dec 2023 06:33 )    Color: x / Appearance: x / SG: x / pH: x  Gluc: 322 mg/dL / Ketone: x  / Bili: x / Urobili: x   Blood: x / Protein: x / Nitrite: x   Leuk Esterase: x / RBC: x / WBC x   Sq Epi: x / Non Sq Epi: x / Bacteria: x        Lactate, Blood: 2.0 mmol/L (12-20-23 @ 06:33)  Lactate, Blood: 3.7 mmol/L *H* (12-19-23 @ 19:16)          RADIOLOGY:  CT Head No Cont (12.19.23 @ 17:16)   IMPRESSION:  1.  No evidence of acute intracranial pathology.  2.  Chronic microvascular changes. Age-indeterminate left internal capsule lacunar infarct.        ASSESSMENT AND PLAN  68 year old female PMHx HTN, HLD, DM, hx CVA, presents to the ED for hyperglycemia and hypertension in setting of poor medication compliance. Pt also AAOx2 on arrival to ED, now AAOx3. Admit to Medicine.     # Hyperglycemia   # DM Type II  - home meds: Metformin 1000mg BID (non-compliant)  - glucose 557 on admission, B-hb negative  - pH 7.42, AG 13  - s/p 26U IV insulin  - On IVF LR 75cc/h  - Lactate 3.7 - 2  - On Lantus 15U and Lispro 5U  - Monitor ISS and increase insulin as needed, target glucose 140-180  - Follow up A1c  - Consult endocrine    # Hypertensive urgency  - non-compliant with home medications   - /106  - Resumed Losartan 50mg qd, HCTZ 12.5mg qd  - 12/20: /74  - Discontinued Amlodipine 12/20    # AMS, resolved  # hx CVA  - CT head 12/19: No evidence of acute intracranial pathology. Chronic microvascular changes. Age-indeterminate left internal capsule lacunar infarct.  - Seen by neuro 12/19: recommend MRI brain  - No need for MRI brain  - c/w ASA and statin     # HLD  - c/w Atorvastatin 80mg qd    # Hypothyroidism  - c/w Synthroid 150 mcg qd            #DVT PPX:     #GI PPX: none    #Diet: NPO    #Activity Order: as tolerated    #Handoff  - Monitor ISS and increase insulin as needed, target glucose 140-180  - Follow up A1c  - Consult endocrine

## 2023-12-20 NOTE — CONSULT NOTE ADULT - ATTENDING COMMENTS
Type 2 DM-uncontrolled.  Basal/bolus insulin while in hospital.  Home on metformin 1000 mg twice a day and glimiperide 2 mg once a day

## 2023-12-20 NOTE — SWALLOW BEDSIDE ASSESSMENT ADULT - SLP GENERAL OBSERVATIONS
pt received in bed asleep arousable w/o c/o pain. +room air +speech clear and fluent, pt denies hx of dysphagia.

## 2023-12-20 NOTE — DISCHARGE NOTE NURSING/CASE MANAGEMENT/SOCIAL WORK - PATIENT PORTAL LINK FT
You can access the FollowMyHealth Patient Portal offered by [facility] by registering at the following website: [email or web address]. By joining [employer]’s FollowMyHealth portal, you will also be able to view your health information using other applications (apps) compatible with our system. You can access the FollowMyHealth Patient Portal offered by [facility] by registering at the following website: [email or web address]. By joining [employer]’s FollowMyHealth portal, you will also be able to view your health information using other applications (apps) compatible with our system.

## 2023-12-20 NOTE — H&P ADULT - NSHPLABSRESULTS_GEN_ALL_CORE
LABS:                        12.4   4.03  )-----------( 190      ( 19 Dec 2023 17:05 )             39.1     12-19    132<L>  |  92<L>  |  13  ----------------------------<  550<HH>  4.2   |  27  |  0.9    Ca    9.6      19 Dec 2023 17:05    TPro  7.9  /  Alb  4.5  /  TBili  0.2  /  DBili  x   /  AST  12  /  ALT  9   /  AlkPhos  116<H>  12-19        < from: CT Head No Cont (12.19.23 @ 17:16) >    IMPRESSION:  1.  No evidence of acute intracranial pathology.  2.  Chronic microvascular changes. Age-indeterminate left internal   capsule lacunar infarct.    --- End of Report ---        < end of copied text >

## 2023-12-20 NOTE — H&P ADULT - NSHPREVIEWOFSYSTEMS_GEN_ALL_CORE
CONSTITUTIONAL: No weakness, fevers, or chills  EYES/ENT: No visual changes;  No vertigo or throat pain   NECK: No pain or stiffness  RESPIRATORY: No cough, wheezing, hemoptysis; No shortness of breath  CARDIOVASCULAR: No chest pain or palpitations  GASTROINTESTINAL: No abdominal or epigastric pain; No nausea, vomiting, diarrhea, or constipation; No hematemesis, melena, or hematochezia  GENITOURINARY: No dysuria, frequency, or hematuria  NEUROLOGICAL: No numbness or weakness  SKIN: No itching or new rashes

## 2023-12-20 NOTE — H&P ADULT - NSICDXPASTMEDICALHX_GEN_ALL_CORE_FT
PAST MEDICAL HISTORY:  CVA (cerebrovascular accident)     Diabetes     Hyperlipidemia     Hypertension     Hypothyroidism

## 2023-12-20 NOTE — CONSULT NOTE ADULT - SUBJECTIVE AND OBJECTIVE BOX
HPI:  68 year old female PMHx HTN, HLD, DM, hx CVA, presents to the ED for hyperglycemia and hypertension. Pt was AAOx2 on arrival to the ED. Patient reports poor medication compliance and states she did not take her medication today. Patient is now AAOx3. Denies chest pain, headache, blurry vision, SOB, abdominal pain N/V/D, fever/chills.     T 97.8, HR 71, /106, RR 16, 96% on RA  Na 132, glucose 557>426, , lactate 3.7  CT head: No evidence of acute intracranial pathology. Chronic microvascular changes. Age-indeterminate left internal capsule lacunar infarct.  Received 1L NC bolus, 6u IV insulin  Admit to Medicine  (20 Dec 2023 00:06)    Endocrinology  pt is type 2 diabetic, never on insulin on metformin 1000mg BID by mouth  she does not follow with an endocrinologist, she has a PCP    pt denies excessive thirst or urination, abdominal pain, nausea vomiting, numbness tingling in UE/LE, vision problems, nonhealing wounds    she came to the hospital at discretion of her visiting doctor who noted her sugars to be elevated, DKA/HHS was ruled out on admission    she states she forgot to take her DM medications and was drinking soda   she lives alone but has help from her daughter    she checks her sugars at home and has readings  usually in the 100s and 200s but never above 300    in hospital she is on basal/bolus 15/5/5/5 insulin regimen with improving sugars, but above target 140-180

## 2023-12-20 NOTE — CONSULT NOTE ADULT - ASSESSMENT
Assessment    uncontrolled diabetes secondary to medication and diet noncompliance  no signs of DKA/HHS    Plan  - continue and adjust basal/bolus regimen for target sugars 140-180  - sliding scale insulin  - carb consistent diet  - educated on importance of medication/diet compliance and risk of DKA/HHS    on discharge   - resume metformin 1000mg twice daily by mouth  - start Glimepiride 2mg twice daily by mouth (aware of risk of hypoglycemia in elderly with sulfonylurea therapy, but kidney function noted to be normal)   - no need for endocrinologist follow up, she can follow with her PCP    please reach out with any questions/comments/concerns  above plan discussed with Dr Rodriguez Endocrinologist  please check back for attending attestation for any changes to above plan or additional recommendations    Assessment    uncontrolled diabetes secondary to medication and diet noncompliance  no signs of DKA/HHS    Plan  - continue and adjust basal/bolus regimen for target sugars 140-180  - sliding scale insulin  - carb consistent diet  - educated on importance of medication/diet compliance and risk of DKA/HHS    on discharge   - resume metformin 1000mg twice daily by mouth  - start Glimepiride 2mg once daily (aware of risk of hypoglycemia in elderly with sulfonylurea therapy, but kidney function noted to be normal)   - no need for endocrinologist follow up, she can follow with her PCP    please reach out with any questions/comments/concerns  above plan discussed with Dr Rodriguez Endocrinologist  please check back for attending attestation for any changes to above plan or additional recommendations

## 2023-12-20 NOTE — DISCHARGE NOTE NURSING/CASE MANAGEMENT/SOCIAL WORK - NSDCPEFALRISK_GEN_ALL_CORE
For information on Fall & Injury Prevention, visit: [email or web address] OR  [email or web address] OR  https://www.cdc.gov/steadi/patient.html For information on Fall & Injury Prevention, visit: [email or web address] OR  [email or web address] OR  https://www.cdc.gov/steadi/patient.html

## 2023-12-20 NOTE — H&P ADULT - ASSESSMENT
68 year old female PMHx HTN, HLD, DM, hx CVA, presents to the ED for hyperglycemia and hypertension in setting of poor medication compliance. Pt also AAOx2 on arrival to ED, now AAOx3. Admit to Medicine.     # Hyperglycemia   # DM Type II  - glucose 557>426, B-hb negative  - lactate 3.7  - given 6u IV insulin   - home meds: Metformin 1000mg BID (non-compliant)  - give 8u IV insulin  - repeat lactate s/p IVF   - start Lantus 15u, ISS  - check A1c  - monitor FS, target glucose 140-180    # Hypertensive urgency  - non-compliant with home medications   - /106  - resume Losartan 50mg qd, HCTZ 12.5mg qd    # AMS, resolved  # hx CVA  - CT head: No evidence of acute intracranial pathology. Chronic microvascular changes. Age-indeterminate left internal capsule lacunar infarct.  - Neuro consulted; recommend MRI brain  - c/w ASA and statin     # HTN  - c/w HCTZ 12.5mg and Losartan 50mg qd    # HLD  - c/w Atorvastatin 80mg qd    # Hypothyroidism  - c/w Synthroid 150 mcg qd    #DVT ppx: Lovenox  #GI ppx: n/a  #Diet: S&S  #Activity: IAT  #Dispo: Medicine

## 2023-12-20 NOTE — H&P ADULT - NSHPPHYSICALEXAM_GEN_ALL_CORE
GENERAL: NAD, lying in bed comfortably  CHEST/LUNG: CTAB  HEART: RRR no MRG  ABDOMEN: BSx4; Soft, nontender, nondistended  EXTREMITIES:  No clubbing, cyanosis, or edema  NERVOUS SYSTEM:  A&Ox3, no focal deficits   SKIN: No rashes or lesions

## 2023-12-20 NOTE — H&P ADULT - ATTENDING COMMENTS
68 year old female PMHx HTN, HLD, DM, hx CVA, presents to the ED for hyperglycemia and hypertension in setting of poor medication compliance. Pt also AAOx2 on arrival to ED, now AAOx3.     Agree  with assessment  except for changes below.  Vital Signs (24 Hrs):  T(C): 36.9 (12-19-23 @ 22:56), Max: 36.9 (12-19-23 @ 22:56)  HR: 91 (12-20-23 @ 01:21) (70 - 91)  BP: 123/65 (12-20-23 @ 01:21) (123/65 - 188/106)  RR: 18 (12-20-23 @ 01:21) (16 - 18)  SpO2: 98% (12-20-23 @ 01:21) (96% - 98%)    CT head: No evidence of acute intracranial pathology. Chronic microvascular changes. Age-indeterminate left internal capsule lacunar infarct.    IMPRESSION  Metabolic  Encephalopathy  Secondary to  Hyperglycemia   Hx  DM   No AG, BHB wnl    Elevated Lactate now Improved   S/p 22 U IV Insulin,  Not Resolved   Not in DKA   Repeat lactate   Start  IV  insulin Infusion   c/w IVF  Hold oral meds;  check fs; Hold for Hypoglycemia   FS   Gaol 140-180   Will be  Upgrade to SDU  for drip   Neuro following ; recommend MRI brain  c/w ASA and statin      Hypertensive urgency  - non-compliant with home medications   - /106  - resume Losartan 50mg qd, HCTZ 12.5mg qd      Hx  CVA  - Neuro consulted; recommend MRI brain  - c/w ASA and statin     Hx  HTN - c/w HCTZ 12.5mg and Losartan 50mg qd    Hx  HLD - c/w Atorvastatin 80mg qd    Hx  Hypothyroidism - c/w Synthroid 150 mcg qd        Seen on 12/19

## 2023-12-20 NOTE — PROGRESS NOTE ADULT - ATTENDING COMMENTS
My note supersedes the resident's note in the event of a discrepancy -    Patient seen and examined this morning  lying in bed  in nad   no complaints    Vital Signs Last 24 Hrs  T(C): 36 (20 Dec 2023 07:05), Max: 36.9 (19 Dec 2023 22:56)  T(F): 96.8 (20 Dec 2023 07:05), Max: 98.4 (19 Dec 2023 22:56)  HR: 76 (20 Dec 2023 07:05) (70 - 91)  BP: 124/73 (20 Dec 2023 07:05) (123/65 - 188/106)  BP(mean): --  RR: 18 (20 Dec 2023 07:05) (16 - 18)  SpO2: 98% (20 Dec 2023 01:21) (96% - 98%)    Parameters below as of 20 Dec 2023 01:21  Patient On (Oxygen Delivery Method): room air    #Metabolic Encephalopathy Secondary to Hyperglycemia - resolved  #DM II with medical noncompliance   #Obesity - diet and lifestyle modification   #HTN with hypertensive urgency - resolved  #Dyslipidemia   #history of CVA  #hypothyroidism on synthroid     - increase dose of insulin today  - endocrine cs  - follow up a1c  - dc plavix   - asa/plavix/statin   - discussed diet control and medication compliance    Progress Note Handoff  Pending Consults: endo  Pending Tests: none  Pending Results: a1c  Family Discussion: Discussed labs, meds, diet compliance, insulin, endocrine cs and overall plan of care with pt and medical staff. PFD placed for 24hrs   Disposition: Home__x___/SNF______/Other_____/Unknown at this time_____  Spent over 55 min reviewing chart, speaking with patient/family and on coordinating patient care during interdisciplinary rounds

## 2023-12-20 NOTE — H&P ADULT - HISTORY OF PRESENT ILLNESS
68 year old female PMHx HTN, HLD, DM, hx CVA, presents to the ED for hyperglycemia and hypertension. Pt was AAOx2 on arrival to the ED. Patient reports poor medication compliance and states she did not take her medication today. Patient is now AAOx3. Denies chest pain, headache, blurry vision, SOB, abdominal pain N/V/D, fever/chills.     T 97.8, HR 71, /106, RR 16, 96% on RA  Na 132, glucose 557>426, , lactate 3.7  CT head: No evidence of acute intracranial pathology. Chronic microvascular changes. Age-indeterminate left internal capsule lacunar infarct.  Received 1L NC bolus, 6u IV insulin  Admit to Medicine

## 2023-12-20 NOTE — CONSULT NOTE ADULT - CONSULT REQUESTED DATE/TIME
20-Dec-2023 16:40
19-Dec-2023 18:57
My signature below certifies that the above stated patient is homebound and upon completion of the Face-To-Face encounter, has the need for intermittent skilled nursing, physical therapy and/or speech or occupational therapy services in their home for their current diagnosis as outlined in their initial plan of care. These services will continue to be monitored by myself or another physician.

## 2023-12-21 ENCOUNTER — TRANSCRIPTION ENCOUNTER (OUTPATIENT)
Age: 68
End: 2023-12-21

## 2023-12-21 VITALS
HEART RATE: 56 BPM | SYSTOLIC BLOOD PRESSURE: 152 MMHG | RESPIRATION RATE: 18 BRPM | DIASTOLIC BLOOD PRESSURE: 57 MMHG | OXYGEN SATURATION: 97 % | TEMPERATURE: 97 F

## 2023-12-21 LAB
CULTURE RESULTS: SIGNIFICANT CHANGE UP
CULTURE RESULTS: SIGNIFICANT CHANGE UP
GLUCOSE BLDC GLUCOMTR-MCNC: 303 MG/DL — HIGH (ref 70–99)
GLUCOSE BLDC GLUCOMTR-MCNC: 303 MG/DL — HIGH (ref 70–99)
GLUCOSE BLDC GLUCOMTR-MCNC: 394 MG/DL — HIGH (ref 70–99)
GLUCOSE BLDC GLUCOMTR-MCNC: 394 MG/DL — HIGH (ref 70–99)
SPECIMEN SOURCE: SIGNIFICANT CHANGE UP
SPECIMEN SOURCE: SIGNIFICANT CHANGE UP

## 2023-12-21 PROCEDURE — 99239 HOSP IP/OBS DSCHRG MGMT >30: CPT

## 2023-12-21 RX ORDER — GLIMEPIRIDE 1 MG
1 TABLET ORAL
Qty: 60 | Refills: 0
Start: 2023-12-21 | End: 2024-01-19

## 2023-12-21 RX ORDER — CLOPIDOGREL BISULFATE 75 MG/1
75 TABLET, FILM COATED ORAL DAILY
Refills: 0 | Status: DISCONTINUED | OUTPATIENT
Start: 2023-12-21 | End: 2023-12-21

## 2023-12-21 RX ORDER — AMLODIPINE BESYLATE 2.5 MG/1
5 TABLET ORAL DAILY
Refills: 0 | Status: DISCONTINUED | OUTPATIENT
Start: 2023-12-21 | End: 2023-12-21

## 2023-12-21 RX ORDER — AMLODIPINE BESYLATE 2.5 MG/1
1 TABLET ORAL
Qty: 0 | Refills: 0 | DISCHARGE
Start: 2023-12-21

## 2023-12-21 RX ORDER — AMLODIPINE BESYLATE 2.5 MG/1
1 TABLET ORAL
Qty: 30 | Refills: 0
Start: 2023-12-21

## 2023-12-21 RX ADMIN — Medication 4: at 08:12

## 2023-12-21 RX ADMIN — Medication 5 UNIT(S): at 08:13

## 2023-12-21 RX ADMIN — Medication 81 MILLIGRAM(S): at 11:53

## 2023-12-21 RX ADMIN — Medication 150 MICROGRAM(S): at 05:52

## 2023-12-21 RX ADMIN — CLOPIDOGREL BISULFATE 75 MILLIGRAM(S): 75 TABLET, FILM COATED ORAL at 11:53

## 2023-12-21 RX ADMIN — LOSARTAN POTASSIUM 50 MILLIGRAM(S): 100 TABLET, FILM COATED ORAL at 05:51

## 2023-12-21 RX ADMIN — AMLODIPINE BESYLATE 5 MILLIGRAM(S): 2.5 TABLET ORAL at 12:00

## 2023-12-21 RX ADMIN — Medication 1 MILLIGRAM(S): at 11:54

## 2023-12-21 RX ADMIN — Medication 5 UNIT(S): at 11:56

## 2023-12-21 RX ADMIN — Medication 5: at 11:55

## 2023-12-21 NOTE — DISCHARGE NOTE PROVIDER - NSDCCPCAREPLAN_GEN_ALL_CORE_FT
PRINCIPAL DISCHARGE DIAGNOSIS  Diagnosis: Hyperglycemia  Assessment and Plan of Treatment: You had a very high blood sugar level when you arrived to the hospital (557). You were started on medications to reduce it called insulin. You sugar level got better throughout your admission with the help of the medications.   Your hemoglobin A1c (reflects sugar in the past 3 months) is 14.8. It is very high. Please make sure to take your medications (Metformin 100mg twice daily and Glimepiride 2mg twice daily). Please make sure to adhere to recommended diabetic diet. It is very important to be compliant with your medications and diet to prevent complications.  Please make sure to follow up with your primary care doctor.   Seek out care if you are experiencing any of the following:  Your blood sugar stays at 300 mg/dL or above.  Your breath smells fruity.  You are vomiting and can’t keep food or drinks down.  You’re having trouble breathing.        SECONDARY DISCHARGE DIAGNOSES  Diagnosis: Hypertension  Assessment and Plan of Treatment: Your blood pressure was high when you arrived to the hospital (188/106). You received blood pressure medications and it got better. Please adhere to your blood pressure medications at home and try to stick with a healthy diet. Seek out care if you are experiencing any sudden headache or vision changes.

## 2023-12-21 NOTE — DISCHARGE NOTE PROVIDER - HOSPITAL COURSE
68 year old female PMHx HTN, HLD, DM, hx CVA, presents to the ED for hyperglycemia and hypertension. Pt was AAOx2 on arrival to the ED. Patient reports poor medication compliance and states she did not take her medication today. Patient is now AAOx3. Denies chest pain, headache, blurry vision, SOB, abdominal pain N/V/D, fever/chills.     In the ED:  T 97.8, HR 71, /106, RR 16, 96% on RA  Na 132, glucose 557>426, , lactate 3.7  CT head: No evidence of acute intracranial pathology. Chronic microvascular changes. Age-indeterminate left internal capsule lacunar infarct.  Received 1L NC bolus, 6u IV insulin  Admit to Medicine.     Patient was started on LR 75cc/h. She received several doses of IV Insulin and glucose was closely monitored. Glucose trended down from 557 to range 150-200. Lactate trended down from 3.7 to 2. pH 7.42, AG 13. She was started on Lantus 15U and Lispro 5U with a sliding scale.      # Hyperglycemia   # DM Type II  - home meds: Metformin 1000mg BID (non-compliant)  - glucose 557 on admission, B-hb negative  - pH 7.42, AG 13  - s/p 26U IV insulin  - On IVF LR 75cc/h  - Lactate 3.7 - 2  - On Lantus 15U and Lispro 5U  - Monitor ISS and increase insulin as needed, target glucose 140-180  - Follow up A1c  - Consult endocrine    # Hypertensive urgency  - non-compliant with home medications   - /106  - Resumed Losartan 50mg qd, HCTZ 12.5mg qd  - 12/20: /74  - Discontinued Amlodipine 12/20    # AMS, resolved  # hx CVA  - CT head 12/19: No evidence of acute intracranial pathology. Chronic microvascular changes. Age-indeterminate left internal capsule lacunar infarct.  - Seen by neuro 12/19: recommend MRI brain  - No need for MRI brain  - c/w ASA and statin     # HLD  - c/w Atorvastatin 80mg qd    # Hypothyroidism  - c/w Synthroid 150 mcg qd            #DVT PPX:     #GI PPX: none    #Diet: NPO    #Activity Order: as tolerated    #Handoff  - Monitor ISS and increase insulin as needed, target glucose 140-180  - Follow up A1c  - Consult endocrine  - resume metformin 1000mg twice daily by mouth  - start Glimepiride 2mg twice daily by mouth (aware of risk of hypoglycemia in elderly with sulfonylurea therapy, but kidney function noted to be normal)   - no need for endocrinologist follow up, she can follow with her PCP 68 year old female PMHx HTN, HLD, DM, hx CVA, presents to the ED for hyperglycemia and hypertension. Pt was AAOx2 on arrival to the ED. Patient reports poor medication compliance and states she did not take her medication today. Patient is now AAOx3. Denies chest pain, headache, blurry vision, SOB, abdominal pain N/V/D, fever/chills.     In the ED:  T 97.8, HR 71, /106, RR 16, 96% on RA  Na 132, glucose 557>426, , lactate 3.7  CT head: No evidence of acute intracranial pathology. Chronic microvascular changes. Age-indeterminate left internal capsule lacunar infarct.  Received 1L NC bolus, 6u IV insulin  Admit to Medicine.     Patient was started on LR 75cc/h. She received several doses of IV Insulin and glucose was closely monitored. Glucose trended down from 557 to range 150-200. Lactate trended down from 3.7 to 2. pH 7.42, AG 13. She was started on Lantus 15U and Lispro 5U with a sliding scale.   Hemoglobin A1c is 14.8. Patient was seen by endocrinology, they recommended Metformin 1000mg BID Metformin and Glimepiride 2mg BID on discharge. Patient was educated on importance of compliance and the risks of not being compliant (DKA, vision, kidneys and vascular complications). Patient was also educated on diabetic diet.     On admission, patient was in hypertensive urgency, /106. Home medications losartan and hydrochlorothiazide were restarted. BP controlled.   Patient had some altered mental status. CT head was done12/19: No evidence of acute intracranial pathology. Chronic microvascular changes. Age-indeterminate left internal capsule lacunar infarct. She was also seen by neurology who recommended MRI as outpatient. Aspirin and statin were continued.     Other home medications were continued: atorvastatin for HLD and Synthroid for hypothyroidism.    Patient is hemodynamically stable for discharge. 68 year old female PMHx HTN, HLD, DM, hx CVA, presents to the ED for hyperglycemia and hypertension. Pt was AAOx2 on arrival to the ED. Patient reports poor medication compliance and states she did not take her medication today. Patient is now AAOx3. Denies chest pain, headache, blurry vision, SOB, abdominal pain N/V/D, fever/chills.     In the ED:  T 97.8, HR 71, /106, RR 16, 96% on RA  Na 132, glucose 557>426, , lactate 3.7  CT head: No evidence of acute intracranial pathology. Chronic microvascular changes. Age-indeterminate left internal capsule lacunar infarct.  Received 1L NC bolus, 6u IV insulin  Admit to Medicine.     Patient was started on LR 75cc/h. She received several doses of IV Insulin and glucose was closely monitored. Glucose trended down from 557 to range 150-200. Lactate trended down from 3.7 to 2. pH 7.42, AG 13. She was started on Lantus 15U and Lispro 5U with a sliding scale.   Hemoglobin A1c is 14.8. Patient was seen by endocrinology, they recommended Metformin 1000mg BID Metformin and Glimepiride 2mg BID on discharge. Patient was educated on importance of compliance and the risks of not being compliant (DKA, vision, kidneys and vascular complications). Patient was also educated on diabetic diet.     On admission, patient was in hypertensive urgency, /106. Home medications losartan and hydrochlorothiazide were restarted. BP controlled.   Patient had some altered mental status. CT head was done 12/19: No evidence of acute intracranial pathology. Chronic microvascular changes. Age-indeterminate left internal capsule lacunar infarct. She was also seen by neurology who recommended MRI as outpatient. Aspirin and statin were continued.     Other home medications were continued: atorvastatin for HLD and Synthroid for hypothyroidism.    Patient is hemodynamically stable for discharge. medicine attending -    Patient seen and examined this morning  sitting in bed and eating breakfast  in nad  no complaints  asking to go home     Vital Signs Last 24 Hrs  T(C): 36.1 (21 Dec 2023 08:01), Max: 36.4 (21 Dec 2023 00:01)  T(F): 97 (21 Dec 2023 08:01), Max: 97.6 (21 Dec 2023 00:01)  HR: 56 (21 Dec 2023 08:01) (56 - 73)  BP: 152/57 (21 Dec 2023 08:01) (125/68 - 152/57)  BP(mean): --  RR: 18 (21 Dec 2023 08:01) (18 - 18)  SpO2: 97% (21 Dec 2023 08:01) (97% - 97%)        68 year old female PMHx HTN, HLD, DM, hx CVA, presents to the ED for hyperglycemia and hypertension. Pt was AAOx2 on arrival to the ED. Patient reports poor medication compliance and states she did not take her medication today. Patient is now AAOx3. Denies chest pain, headache, blurry vision, SOB, abdominal pain N/V/D, fever/chills.     In the ED:  T 97.8, HR 71, /106, RR 16, 96% on RA  Na 132, glucose 557>426, , lactate 3.7  CT head: No evidence of acute intracranial pathology. Chronic microvascular changes. Age-indeterminate left internal capsule lacunar infarct.  Received 1L NC bolus, 6u IV insulin  Admit to Medicine.     Patient was started on LR 75cc/h. She received several doses of IV Insulin and glucose was closely monitored. Glucose trended down from 557 to range 150-200. Lactate trended down from 3.7 to 2. pH 7.42, AG 13. She was started on Lantus 15U and Lispro 5U with a sliding scale.   Hemoglobin A1c is 14.8. Patient was seen by endocrinology, they recommended Metformin 1000mg BID Metformin and Glimepiride 2mg BID on discharge. Patient was educated on importance of compliance and the risks of not being compliant (DKA, vision, kidneys and vascular complications). Patient was also educated on diabetic diet.     On admission, patient was in hypertensive urgency, /106. Home medications losartan and hydrochlorothiazide were restarted. BP controlled.   Patient had some altered mental status. CT head was done 12/19: No evidence of acute intracranial pathology. Chronic microvascular changes. Age-indeterminate left internal capsule lacunar infarct. She was also seen by neurology who recommended MRI as outpatient. Aspirin and statin were continued.     Other home medications were continued: atorvastatin for HLD and Synthroid for hypothyroidism.    Patient is hemodynamically stable for discharge.  D/C today; d/c planning took over 75 min  d/c papers edited by me  discussed d/c plan and all instructions in detail

## 2023-12-21 NOTE — DISCHARGE NOTE PROVIDER - CARE PROVIDER_API CALL
Lester Walters  Internal Medicine  [address] [place] [address]  Phone: [phone]  Fax: [phone]  Follow Up Time: 1-3 days    Willard Parkre  Endocrinology/Metab/Diabetes  [address] [place] [address]  Phone: [phone]  Fax: [phone]  Follow Up Time: 1-3 days    Jack Phillips  Neurology  [address] [place] [address]  Phone: [phone]  Fax: [phone]  Follow Up Time: 1 month   Lester Walters  Internal Medicine  [address] [place] [address]  Phone: [phone]  Fax: [phone]  Follow Up Time: 1-3 days    Willard Parker  Endocrinology/Metab/Diabetes  [address] [place] [address]  Phone: [phone]  Fax: [phone]  Follow Up Time: 1-3 days    Jack Phillips  Neurology  [address] [place] [id number]  Phone: [phone]  Fax: [phone]  Follow Up Time: 1 month

## 2023-12-21 NOTE — DISCHARGE NOTE PROVIDER - PROVIDER TOKENS
PROVIDER:[TOKEN:[[id number]],FOLLOWUP:[1-3 days]],PROVIDER:[TOKEN:[[id number]],FOLLOWUP:[1-3 days]],PROVIDER:[TOKEN:[[id number]],FOLLOWUP:[1 month]] PROVIDER:[TOKEN:[[id number]],FOLLOWUP:[1-3 days]],PROVIDER:[TOKEN:[[id number]],FOLLOWUP:[1-3 days]],PROVIDER:[TOKEN:[[id number]],FOLLOWUP:[1 month]]

## 2023-12-21 NOTE — DISCHARGE NOTE PROVIDER - NSDCMRMEDTOKEN_GEN_ALL_CORE_FT
aspirin 81 mg oral tablet: 1 tab(s) orally once a day  atorvastatin 80 mg oral tablet: 1 tab(s) orally once a day (at bedtime)  clopidogrel 75 mg oral tablet: 1 tab(s) orally once a day  folic acid 1 mg oral tablet: 1 tab(s) orally once a day  glimepiride 2 mg oral tablet: 1 tab(s) orally 2 times a day  losartan-hydrochlorothiazide 50 mg-12.5 mg oral tablet: 1 tab(s) orally once a day  metFORMIN 1000 mg oral tablet: 1 tab(s) orally 2 times a day  senna oral tablet: 2 tab(s) orally once a day (at bedtime)  Synthroid 150 mcg (0.15 mg) oral tablet: 1 tab(s) orally once a day   amLODIPine 5 mg oral tablet: 1 tab(s) orally once a day  aspirin 81 mg oral tablet: 1 tab(s) orally once a day  atorvastatin 80 mg oral tablet: 1 tab(s) orally once a day (at bedtime)  clopidogrel 75 mg oral tablet: 1 tab(s) orally once a day  folic acid 1 mg oral tablet: 1 tab(s) orally once a day  glimepiride 2 mg oral tablet: 1 tab(s) orally 2 times a day  losartan-hydrochlorothiazide 50 mg-12.5 mg oral tablet: 1 tab(s) orally once a day  metFORMIN 1000 mg oral tablet: 1 tab(s) orally 2 times a day  senna oral tablet: 2 tab(s) orally once a day (at bedtime)  Synthroid 150 mcg (0.15 mg) oral tablet: 1 tab(s) orally once a day

## 2023-12-21 NOTE — CHART NOTE - NSCHARTNOTEFT_GEN_A_CORE
D/C in-patient MRI brain. Obtain MRI brain non con in out-patient neuro clinic in 3-4 weeks. Repeat CTH if changes in neuro exam.     Discussed w Dr. Phillips D/C in-patient MRI brain. Obtain MRI brain non con in out-patient stroke clinic in 3-4 weeks. Repeat CTH if changes in neuro exam.     Discussed w Dr. Phillips CT head with age-indeterminate stroke without evidence of new stroke symptoms on exam. OK to D/C in-patient MRI brain. Obtain MRI brain non con in out-patient stroke clinic in 3-4 weeks. Repeat CTH if changes in neuro exam.     Discussed w Dr. Phillips

## 2023-12-21 NOTE — DISCHARGE NOTE PROVIDER - CARE PROVIDERS DIRECT ADDRESSES
,DirectAddress_Unknown,[email or web address],DirectAddress_Unknown ,DirectAddress_Unknown,[email or web address],[email or web address]

## 2023-12-27 DIAGNOSIS — Z79.84 LONG TERM (CURRENT) USE OF ORAL HYPOGLYCEMIC DRUGS: ICD-10-CM

## 2023-12-27 DIAGNOSIS — Z79.82 LONG TERM (CURRENT) USE OF ASPIRIN: ICD-10-CM

## 2023-12-27 DIAGNOSIS — Z79.890 HORMONE REPLACEMENT THERAPY: ICD-10-CM

## 2023-12-27 DIAGNOSIS — I16.0 HYPERTENSIVE URGENCY: ICD-10-CM

## 2023-12-27 DIAGNOSIS — Z91.119 PATIENT'S NONCOMPLIANCE WITH DIETARY REGIMEN DUE TO UNSPECIFIED REASON: ICD-10-CM

## 2023-12-27 DIAGNOSIS — E03.9 HYPOTHYROIDISM, UNSPECIFIED: ICD-10-CM

## 2023-12-27 DIAGNOSIS — E78.5 HYPERLIPIDEMIA, UNSPECIFIED: ICD-10-CM

## 2023-12-27 DIAGNOSIS — Z86.73 PERSONAL HISTORY OF TRANSIENT ISCHEMIC ATTACK (TIA), AND CEREBRAL INFARCTION WITHOUT RESIDUAL DEFICITS: ICD-10-CM

## 2023-12-27 DIAGNOSIS — E11.65 TYPE 2 DIABETES MELLITUS WITH HYPERGLYCEMIA: ICD-10-CM

## 2023-12-27 DIAGNOSIS — Z79.899 OTHER LONG TERM (CURRENT) DRUG THERAPY: ICD-10-CM

## 2023-12-27 DIAGNOSIS — I10 ESSENTIAL (PRIMARY) HYPERTENSION: ICD-10-CM

## 2023-12-27 DIAGNOSIS — Z91.148 PATIENT'S OTHER NONCOMPLIANCE WITH MEDICATION REGIMEN FOR OTHER REASON: ICD-10-CM

## 2023-12-27 DIAGNOSIS — G93.41 METABOLIC ENCEPHALOPATHY: ICD-10-CM

## 2023-12-27 DIAGNOSIS — Z79.02 LONG TERM (CURRENT) USE OF ANTITHROMBOTICS/ANTIPLATELETS: ICD-10-CM

## 2023-12-27 DIAGNOSIS — E83.42 HYPOMAGNESEMIA: ICD-10-CM
